# Patient Record
Sex: FEMALE | Race: WHITE | Employment: UNEMPLOYED | URBAN - METROPOLITAN AREA
[De-identification: names, ages, dates, MRNs, and addresses within clinical notes are randomized per-mention and may not be internally consistent; named-entity substitution may affect disease eponyms.]

---

## 2018-03-31 ENCOUNTER — HOSPITAL ENCOUNTER (EMERGENCY)
Age: 38
Discharge: HOME | End: 2018-03-31
Payer: COMMERCIAL

## 2018-03-31 VITALS
BODY MASS INDEX: 32.61 KG/M2 | DIASTOLIC BLOOD PRESSURE: 92 MMHG | HEART RATE: 85 BPM | RESPIRATION RATE: 16 BRPM | WEIGHT: 190 LBS | TEMPERATURE: 98.6 F | OXYGEN SATURATION: 99 % | SYSTOLIC BLOOD PRESSURE: 140 MMHG

## 2018-03-31 DIAGNOSIS — N30.00 ACUTE CYSTITIS WITHOUT HEMATURIA: Primary | ICD-10-CM

## 2018-03-31 LAB
BACTERIA: ABNORMAL /HPF
BILIRUBIN URINE: NEGATIVE
BLOOD, URINE: ABNORMAL
CLARITY: ABNORMAL
COLOR: ABNORMAL
EPITHELIAL CELLS, UA: ABNORMAL /HPF
GLUCOSE URINE: NEGATIVE MG/DL
HCG(URINE) PREGNANCY TEST: NEGATIVE
KETONES, URINE: NEGATIVE MG/DL
LEUKOCYTE ESTERASE, URINE: ABNORMAL
NITRITE, URINE: NEGATIVE
PH UA: 5 (ref 5–9)
PROTEIN UA: NEGATIVE MG/DL
RBC UA: ABNORMAL /HPF (ref 0–2)
SPECIFIC GRAVITY UA: <=1.005 (ref 1–1.03)
UROBILINOGEN, URINE: 0.2 E.U./DL
WBC UA: ABNORMAL /HPF (ref 0–5)

## 2018-03-31 PROCEDURE — 87088 URINE BACTERIA CULTURE: CPT

## 2018-03-31 PROCEDURE — 99212 OFFICE O/P EST SF 10 MIN: CPT

## 2018-03-31 PROCEDURE — 81025 URINE PREGNANCY TEST: CPT

## 2018-03-31 PROCEDURE — 81001 URINALYSIS AUTO W/SCOPE: CPT

## 2018-03-31 RX ORDER — CEPHALEXIN 500 MG/1
500 CAPSULE ORAL 3 TIMES DAILY
Qty: 21 CAPSULE | Refills: 0 | Status: SHIPPED | OUTPATIENT
Start: 2018-03-31 | End: 2018-04-07

## 2018-03-31 RX ORDER — FERROUS SULFATE 325(65) MG
325 TABLET ORAL EVERY OTHER DAY
Status: ON HOLD | COMMUNITY
End: 2019-12-31 | Stop reason: ALTCHOICE

## 2018-03-31 ASSESSMENT — PAIN DESCRIPTION - DESCRIPTORS: DESCRIPTORS: ACHING

## 2018-03-31 ASSESSMENT — PAIN SCALES - GENERAL: PAINLEVEL_OUTOF10: 7

## 2018-03-31 ASSESSMENT — PAIN DESCRIPTION - FREQUENCY: FREQUENCY: CONTINUOUS

## 2018-03-31 ASSESSMENT — PAIN DESCRIPTION - LOCATION: LOCATION: BACK

## 2018-03-31 ASSESSMENT — PAIN DESCRIPTION - PAIN TYPE: TYPE: ACUTE PAIN

## 2018-04-01 LAB — URINE CULTURE, ROUTINE: NORMAL

## 2018-04-23 ENCOUNTER — APPOINTMENT (OUTPATIENT)
Dept: GENERAL RADIOLOGY | Age: 38
End: 2018-04-23
Payer: COMMERCIAL

## 2018-04-23 ENCOUNTER — HOSPITAL ENCOUNTER (EMERGENCY)
Age: 38
Discharge: HOME | End: 2018-04-23
Attending: FAMILY MEDICINE
Payer: COMMERCIAL

## 2018-04-23 VITALS
SYSTOLIC BLOOD PRESSURE: 110 MMHG | BODY MASS INDEX: 34.96 KG/M2 | WEIGHT: 190 LBS | HEIGHT: 62 IN | OXYGEN SATURATION: 98 % | DIASTOLIC BLOOD PRESSURE: 80 MMHG | RESPIRATION RATE: 14 BRPM | HEART RATE: 76 BPM | TEMPERATURE: 98.2 F

## 2018-04-23 DIAGNOSIS — R07.89 ATYPICAL CHEST PAIN: Primary | ICD-10-CM

## 2018-04-23 DIAGNOSIS — R00.2 PALPITATIONS: ICD-10-CM

## 2018-04-23 DIAGNOSIS — R10.13 ABDOMINAL PAIN, EPIGASTRIC: ICD-10-CM

## 2018-04-23 LAB
ANION GAP SERPL CALCULATED.3IONS-SCNC: 11 MMOL/L (ref 7–16)
BASOPHILS ABSOLUTE: 0.05 E9/L (ref 0–0.2)
BASOPHILS RELATIVE PERCENT: 0.7 % (ref 0–2)
BUN BLDV-MCNC: 16 MG/DL (ref 6–20)
CALCIUM SERPL-MCNC: 10.1 MG/DL (ref 8.6–10.2)
CHLORIDE BLD-SCNC: 103 MMOL/L (ref 98–107)
CO2: 26 MMOL/L (ref 22–29)
CREAT SERPL-MCNC: 0.7 MG/DL (ref 0.5–1)
EOSINOPHILS ABSOLUTE: 0.24 E9/L (ref 0.05–0.5)
EOSINOPHILS RELATIVE PERCENT: 3.6 % (ref 0–6)
GFR AFRICAN AMERICAN: >60
GFR NON-AFRICAN AMERICAN: >60 ML/MIN/1.73
GLUCOSE BLD-MCNC: 127 MG/DL (ref 74–109)
HCT VFR BLD CALC: 40.1 % (ref 34–48)
HEMOGLOBIN: 13.6 G/DL (ref 11.5–15.5)
IMMATURE GRANULOCYTES #: 0.03 E9/L
IMMATURE GRANULOCYTES %: 0.4 % (ref 0–5)
LYMPHOCYTES ABSOLUTE: 1.74 E9/L (ref 1.5–4)
LYMPHOCYTES RELATIVE PERCENT: 25.7 % (ref 20–42)
MCH RBC QN AUTO: 30.5 PG (ref 26–35)
MCHC RBC AUTO-ENTMCNC: 33.9 % (ref 32–34.5)
MCV RBC AUTO: 89.9 FL (ref 80–99.9)
MONOCYTES ABSOLUTE: 0.41 E9/L (ref 0.1–0.95)
MONOCYTES RELATIVE PERCENT: 6.1 % (ref 2–12)
NEUTROPHILS ABSOLUTE: 4.29 E9/L (ref 1.8–7.3)
NEUTROPHILS RELATIVE PERCENT: 63.5 % (ref 43–80)
PDW BLD-RTO: 12 FL (ref 11.5–15)
PLATELET # BLD: 321 E9/L (ref 130–450)
PMV BLD AUTO: 8.4 FL (ref 7–12)
POTASSIUM REFLEX MAGNESIUM: 4.6 MMOL/L (ref 3.5–5)
RBC # BLD: 4.46 E12/L (ref 3.5–5.5)
SODIUM BLD-SCNC: 140 MMOL/L (ref 132–146)
TROPONIN: <0.01 NG/ML (ref 0–0.03)
WBC # BLD: 6.8 E9/L (ref 4.5–11.5)

## 2018-04-23 PROCEDURE — 71046 X-RAY EXAM CHEST 2 VIEWS: CPT

## 2018-04-23 PROCEDURE — 6370000000 HC RX 637 (ALT 250 FOR IP): Performed by: FAMILY MEDICINE

## 2018-04-23 PROCEDURE — 84484 ASSAY OF TROPONIN QUANT: CPT

## 2018-04-23 PROCEDURE — 93005 ELECTROCARDIOGRAM TRACING: CPT | Performed by: FAMILY MEDICINE

## 2018-04-23 PROCEDURE — 99285 EMERGENCY DEPT VISIT HI MDM: CPT

## 2018-04-23 PROCEDURE — 85025 COMPLETE CBC W/AUTO DIFF WBC: CPT

## 2018-04-23 PROCEDURE — 80048 BASIC METABOLIC PNL TOTAL CA: CPT

## 2018-04-23 PROCEDURE — 36415 COLL VENOUS BLD VENIPUNCTURE: CPT

## 2018-04-23 RX ORDER — SUCRALFATE 1 G/1
1 TABLET ORAL 4 TIMES DAILY
Qty: 120 TABLET | Refills: 3 | Status: ON HOLD | OUTPATIENT
Start: 2018-04-23 | End: 2019-12-31 | Stop reason: ALTCHOICE

## 2018-04-23 RX ORDER — ASPIRIN 81 MG/1
324 TABLET, CHEWABLE ORAL ONCE
Status: DISCONTINUED | OUTPATIENT
Start: 2018-04-23 | End: 2018-04-23 | Stop reason: HOSPADM

## 2018-04-23 RX ADMIN — Medication 30 ML: at 09:25

## 2018-04-23 ASSESSMENT — ENCOUNTER SYMPTOMS
ORTHOPNEA: 0
VOMITING: 0
ABDOMINAL PAIN: 0
COUGH: 0
BACK PAIN: 0
HEARTBURN: 0
TROUBLE SWALLOWING: 0
SHORTNESS OF BREATH: 0
NAUSEA: 0

## 2018-04-23 ASSESSMENT — PAIN DESCRIPTION - PAIN TYPE
TYPE: ACUTE PAIN
TYPE: ACUTE PAIN

## 2018-04-23 ASSESSMENT — PAIN DESCRIPTION - FREQUENCY: FREQUENCY: INTERMITTENT

## 2018-04-23 ASSESSMENT — PAIN DESCRIPTION - ORIENTATION
ORIENTATION: MID
ORIENTATION: MID

## 2018-04-23 ASSESSMENT — PAIN SCALES - GENERAL
PAINLEVEL_OUTOF10: 3
PAINLEVEL_OUTOF10: 7
PAINLEVEL_OUTOF10: 7

## 2018-04-23 ASSESSMENT — PAIN DESCRIPTION - DESCRIPTORS: DESCRIPTORS: HEAVINESS

## 2018-04-23 ASSESSMENT — PAIN DESCRIPTION - LOCATION: LOCATION: CHEST

## 2018-04-23 ASSESSMENT — HEART SCORE: ECG: 0

## 2018-04-30 LAB
EKG ATRIAL RATE: 77 BPM
EKG P AXIS: 27 DEGREES
EKG P-R INTERVAL: 152 MS
EKG Q-T INTERVAL: 376 MS
EKG QRS DURATION: 72 MS
EKG QTC CALCULATION (BAZETT): 425 MS
EKG R AXIS: -3 DEGREES
EKG T AXIS: 23 DEGREES
EKG VENTRICULAR RATE: 77 BPM

## 2018-10-27 ENCOUNTER — HOSPITAL ENCOUNTER (EMERGENCY)
Age: 38
Discharge: HOME | End: 2018-10-27
Payer: COMMERCIAL

## 2018-10-27 VITALS
RESPIRATION RATE: 20 BRPM | BODY MASS INDEX: 36.58 KG/M2 | HEART RATE: 92 BPM | TEMPERATURE: 98.3 F | SYSTOLIC BLOOD PRESSURE: 138 MMHG | OXYGEN SATURATION: 97 % | DIASTOLIC BLOOD PRESSURE: 98 MMHG | WEIGHT: 200 LBS

## 2018-10-27 DIAGNOSIS — K04.7 DENTAL INFECTION: Primary | ICD-10-CM

## 2018-10-27 PROCEDURE — 99212 OFFICE O/P EST SF 10 MIN: CPT

## 2018-10-27 RX ORDER — AMOXICILLIN AND CLAVULANATE POTASSIUM 875; 125 MG/1; MG/1
1 TABLET, FILM COATED ORAL 2 TIMES DAILY
Qty: 20 TABLET | Refills: 0 | Status: SHIPPED | OUTPATIENT
Start: 2018-10-27 | End: 2018-11-06

## 2018-10-27 ASSESSMENT — PAIN DESCRIPTION - PAIN TYPE: TYPE: ACUTE PAIN

## 2018-10-27 ASSESSMENT — PAIN SCALES - GENERAL: PAINLEVEL_OUTOF10: 7

## 2018-10-27 ASSESSMENT — PAIN DESCRIPTION - LOCATION: LOCATION: TEETH

## 2018-10-27 ASSESSMENT — PAIN DESCRIPTION - ORIENTATION: ORIENTATION: LEFT;UPPER

## 2019-07-08 ENCOUNTER — HOSPITAL ENCOUNTER (EMERGENCY)
Age: 39
Discharge: HOME | End: 2019-07-08
Attending: EMERGENCY MEDICINE
Payer: COMMERCIAL

## 2019-07-08 ENCOUNTER — APPOINTMENT (OUTPATIENT)
Dept: CT IMAGING | Age: 39
End: 2019-07-08
Payer: COMMERCIAL

## 2019-07-08 VITALS
TEMPERATURE: 98.1 F | WEIGHT: 190 LBS | RESPIRATION RATE: 16 BRPM | SYSTOLIC BLOOD PRESSURE: 132 MMHG | BODY MASS INDEX: 32.44 KG/M2 | HEART RATE: 76 BPM | HEIGHT: 64 IN | OXYGEN SATURATION: 99 % | DIASTOLIC BLOOD PRESSURE: 62 MMHG

## 2019-07-08 DIAGNOSIS — R10.11 RIGHT UPPER QUADRANT ABDOMINAL PAIN: Primary | ICD-10-CM

## 2019-07-08 LAB
ALBUMIN SERPL-MCNC: 4.4 G/DL (ref 3.5–5.2)
ALP BLD-CCNC: 87 U/L (ref 35–104)
ALT SERPL-CCNC: 11 U/L (ref 0–32)
ANION GAP SERPL CALCULATED.3IONS-SCNC: 13 MMOL/L (ref 7–16)
AST SERPL-CCNC: 15 U/L (ref 0–31)
BACTERIA: ABNORMAL /HPF
BASOPHILS ABSOLUTE: 0.05 E9/L (ref 0–0.2)
BASOPHILS RELATIVE PERCENT: 0.6 % (ref 0–2)
BILIRUB SERPL-MCNC: <0.2 MG/DL (ref 0–1.2)
BILIRUBIN URINE: NEGATIVE
BLOOD, URINE: NEGATIVE
BUN BLDV-MCNC: 18 MG/DL (ref 6–20)
CALCIUM SERPL-MCNC: 9.9 MG/DL (ref 8.6–10.2)
CHLORIDE BLD-SCNC: 102 MMOL/L (ref 98–107)
CLARITY: CLEAR
CO2: 25 MMOL/L (ref 22–29)
COLOR: YELLOW
CREAT SERPL-MCNC: 0.7 MG/DL (ref 0.5–1)
EOSINOPHILS ABSOLUTE: 0.27 E9/L (ref 0.05–0.5)
EOSINOPHILS RELATIVE PERCENT: 3.3 % (ref 0–6)
EPITHELIAL CELLS, UA: ABNORMAL /HPF
GFR AFRICAN AMERICAN: >60
GFR NON-AFRICAN AMERICAN: >60 ML/MIN/1.73
GLUCOSE BLD-MCNC: 137 MG/DL (ref 74–99)
GLUCOSE URINE: NEGATIVE MG/DL
HCT VFR BLD CALC: 42 % (ref 34–48)
HEMOGLOBIN: 14.2 G/DL (ref 11.5–15.5)
IMMATURE GRANULOCYTES #: 0.04 E9/L
IMMATURE GRANULOCYTES %: 0.5 % (ref 0–5)
KETONES, URINE: NEGATIVE MG/DL
LACTIC ACID: 1.6 MMOL/L (ref 0.5–2.2)
LEUKOCYTE ESTERASE, URINE: ABNORMAL
LIPASE: 22 U/L (ref 13–60)
LYMPHOCYTES ABSOLUTE: 2.11 E9/L (ref 1.5–4)
LYMPHOCYTES RELATIVE PERCENT: 25.5 % (ref 20–42)
MCH RBC QN AUTO: 30.4 PG (ref 26–35)
MCHC RBC AUTO-ENTMCNC: 33.8 % (ref 32–34.5)
MCV RBC AUTO: 89.9 FL (ref 80–99.9)
MONOCYTES ABSOLUTE: 0.65 E9/L (ref 0.1–0.95)
MONOCYTES RELATIVE PERCENT: 7.9 % (ref 2–12)
NEUTROPHILS ABSOLUTE: 5.15 E9/L (ref 1.8–7.3)
NEUTROPHILS RELATIVE PERCENT: 62.2 % (ref 43–80)
NITRITE, URINE: NEGATIVE
PDW BLD-RTO: 11.9 FL (ref 11.5–15)
PH UA: 6 (ref 5–9)
PLATELET # BLD: 308 E9/L (ref 130–450)
PMV BLD AUTO: 8.9 FL (ref 7–12)
POTASSIUM SERPL-SCNC: 4.6 MMOL/L (ref 3.5–5)
PROTEIN UA: NEGATIVE MG/DL
RBC # BLD: 4.67 E12/L (ref 3.5–5.5)
RBC UA: ABNORMAL /HPF (ref 0–2)
SODIUM BLD-SCNC: 140 MMOL/L (ref 132–146)
SPECIFIC GRAVITY UA: 1.01 (ref 1–1.03)
TOTAL PROTEIN: 7.8 G/DL (ref 6.4–8.3)
UROBILINOGEN, URINE: 0.2 E.U./DL
WBC # BLD: 8.3 E9/L (ref 4.5–11.5)
WBC UA: ABNORMAL /HPF (ref 0–5)

## 2019-07-08 PROCEDURE — 85025 COMPLETE CBC W/AUTO DIFF WBC: CPT

## 2019-07-08 PROCEDURE — 74176 CT ABD & PELVIS W/O CONTRAST: CPT

## 2019-07-08 PROCEDURE — 36415 COLL VENOUS BLD VENIPUNCTURE: CPT

## 2019-07-08 PROCEDURE — 83605 ASSAY OF LACTIC ACID: CPT

## 2019-07-08 PROCEDURE — 99284 EMERGENCY DEPT VISIT MOD MDM: CPT

## 2019-07-08 PROCEDURE — 83690 ASSAY OF LIPASE: CPT

## 2019-07-08 PROCEDURE — 81001 URINALYSIS AUTO W/SCOPE: CPT

## 2019-07-08 PROCEDURE — 80053 COMPREHEN METABOLIC PANEL: CPT

## 2019-07-08 ASSESSMENT — PAIN DESCRIPTION - DESCRIPTORS: DESCRIPTORS: SHARP

## 2019-07-08 ASSESSMENT — PAIN SCALES - GENERAL: PAINLEVEL_OUTOF10: 7

## 2019-07-08 ASSESSMENT — PAIN DESCRIPTION - PROGRESSION: CLINICAL_PROGRESSION: NOT CHANGED

## 2019-07-08 ASSESSMENT — PAIN DESCRIPTION - ORIENTATION: ORIENTATION: RIGHT;MID

## 2019-07-08 ASSESSMENT — PAIN DESCRIPTION - ONSET: ONSET: ON-GOING

## 2019-07-08 ASSESSMENT — PAIN DESCRIPTION - FREQUENCY: FREQUENCY: INTERMITTENT

## 2019-07-08 ASSESSMENT — PAIN DESCRIPTION - LOCATION: LOCATION: ABDOMEN

## 2019-07-08 ASSESSMENT — PAIN DESCRIPTION - PAIN TYPE: TYPE: ACUTE PAIN

## 2019-07-08 NOTE — ED PROVIDER NOTES
regarding the diagnosis and prognosis. Questions are answered at this time and they are agreeable with the plan.      --------------------------------- IMPRESSION AND DISPOSITION ---------------------------------    IMPRESSION  1.  Right upper quadrant abdominal pain        DISPOSITION  Disposition: Discharge to home  Patient condition is stable                  Jenelle Pichardo MD  07/08/19 [id number]

## 2019-11-01 ENCOUNTER — TELEPHONE (OUTPATIENT)
Dept: ADMINISTRATIVE | Age: 39
End: 2019-11-01

## 2019-11-02 PROBLEM — Z92.89 HISTORY OF EXERCISE STRESS TEST: Status: ACTIVE | Noted: 2019-11-02

## 2019-11-05 ENCOUNTER — OFFICE VISIT (OUTPATIENT)
Dept: CARDIOLOGY CLINIC | Age: 39
End: 2019-11-05
Payer: COMMERCIAL

## 2019-11-05 VITALS
DIASTOLIC BLOOD PRESSURE: 90 MMHG | HEIGHT: 64 IN | RESPIRATION RATE: 16 BRPM | WEIGHT: 193 LBS | BODY MASS INDEX: 32.95 KG/M2 | HEART RATE: 89 BPM | SYSTOLIC BLOOD PRESSURE: 142 MMHG

## 2019-11-05 DIAGNOSIS — Z92.89 HISTORY OF EXERCISE STRESS TEST: ICD-10-CM

## 2019-11-05 DIAGNOSIS — I63.9 CRYPTOGENIC STROKE (HCC): ICD-10-CM

## 2019-11-05 DIAGNOSIS — R07.2 PRECORDIAL PAIN: ICD-10-CM

## 2019-11-05 PROCEDURE — 99245 OFF/OP CONSLTJ NEW/EST HI 55: CPT | Performed by: INTERNAL MEDICINE

## 2019-11-05 PROCEDURE — 93000 ELECTROCARDIOGRAM COMPLETE: CPT | Performed by: INTERNAL MEDICINE

## 2019-11-05 RX ORDER — MIRABEGRON 50 MG/1
50 TABLET, FILM COATED, EXTENDED RELEASE ORAL DAILY
Refills: 0 | COMMUNITY
Start: 2019-10-22 | End: 2020-01-24 | Stop reason: ALTCHOICE

## 2019-11-07 ENCOUNTER — TELEPHONE (OUTPATIENT)
Dept: CARDIOLOGY CLINIC | Age: 39
End: 2019-11-07

## 2019-11-07 ENCOUNTER — HOSPITAL ENCOUNTER (EMERGENCY)
Age: 39
Discharge: HOME | End: 2019-11-07
Payer: COMMERCIAL

## 2019-11-07 ENCOUNTER — APPOINTMENT (OUTPATIENT)
Dept: GENERAL RADIOLOGY | Age: 39
End: 2019-11-07
Payer: COMMERCIAL

## 2019-11-07 VITALS
HEART RATE: 83 BPM | WEIGHT: 193 LBS | DIASTOLIC BLOOD PRESSURE: 98 MMHG | RESPIRATION RATE: 17 BRPM | OXYGEN SATURATION: 100 % | SYSTOLIC BLOOD PRESSURE: 144 MMHG | TEMPERATURE: 97.7 F | BODY MASS INDEX: 33.13 KG/M2

## 2019-11-07 DIAGNOSIS — R04.2 HEMOPTYSIS: Primary | ICD-10-CM

## 2019-11-07 PROBLEM — I63.9 CRYPTOGENIC STROKE (HCC): Status: ACTIVE | Noted: 2019-11-07

## 2019-11-07 LAB
ALBUMIN SERPL-MCNC: 4.5 G/DL (ref 3.5–5.2)
ALP BLD-CCNC: 82 U/L (ref 35–104)
ALT SERPL-CCNC: 19 U/L (ref 0–32)
ANION GAP SERPL CALCULATED.3IONS-SCNC: 8 MMOL/L (ref 7–16)
APTT: 28.6 SEC (ref 24.5–35.1)
AST SERPL-CCNC: 21 U/L (ref 0–31)
BASOPHILS ABSOLUTE: 0.04 E9/L (ref 0–0.2)
BASOPHILS RELATIVE PERCENT: 0.6 % (ref 0–2)
BILIRUB SERPL-MCNC: 0.3 MG/DL (ref 0–1.2)
BUN BLDV-MCNC: 8 MG/DL (ref 6–20)
CALCIUM SERPL-MCNC: 10 MG/DL (ref 8.6–10.2)
CHLORIDE BLD-SCNC: 103 MMOL/L (ref 98–107)
CO2: 29 MMOL/L (ref 22–29)
CREAT SERPL-MCNC: 0.8 MG/DL (ref 0.5–1)
EOSINOPHILS ABSOLUTE: 0.08 E9/L (ref 0.05–0.5)
EOSINOPHILS RELATIVE PERCENT: 1.2 % (ref 0–6)
GFR AFRICAN AMERICAN: >60
GFR NON-AFRICAN AMERICAN: >60 ML/MIN/1.73
GLUCOSE BLD-MCNC: 99 MG/DL (ref 74–99)
HCT VFR BLD CALC: 42.7 % (ref 34–48)
HEMOGLOBIN: 14 G/DL (ref 11.5–15.5)
IMMATURE GRANULOCYTES #: 0.02 E9/L
IMMATURE GRANULOCYTES %: 0.3 % (ref 0–5)
INR BLD: 1
LYMPHOCYTES ABSOLUTE: 1.47 E9/L (ref 1.5–4)
LYMPHOCYTES RELATIVE PERCENT: 21.9 % (ref 20–42)
MCH RBC QN AUTO: 29.7 PG (ref 26–35)
MCHC RBC AUTO-ENTMCNC: 32.8 % (ref 32–34.5)
MCV RBC AUTO: 90.5 FL (ref 80–99.9)
MONOCYTES ABSOLUTE: 0.43 E9/L (ref 0.1–0.95)
MONOCYTES RELATIVE PERCENT: 6.4 % (ref 2–12)
NEUTROPHILS ABSOLUTE: 4.67 E9/L (ref 1.8–7.3)
NEUTROPHILS RELATIVE PERCENT: 69.6 % (ref 43–80)
PDW BLD-RTO: 12 FL (ref 11.5–15)
PLATELET # BLD: 338 E9/L (ref 130–450)
PMV BLD AUTO: 8.6 FL (ref 7–12)
POTASSIUM SERPL-SCNC: 4.6 MMOL/L (ref 3.5–5)
PROTHROMBIN TIME: 11.2 SEC (ref 9.3–12.4)
RBC # BLD: 4.72 E12/L (ref 3.5–5.5)
SODIUM BLD-SCNC: 140 MMOL/L (ref 132–146)
TOTAL PROTEIN: 7.7 G/DL (ref 6.4–8.3)
WBC # BLD: 6.7 E9/L (ref 4.5–11.5)

## 2019-11-07 PROCEDURE — 85730 THROMBOPLASTIN TIME PARTIAL: CPT

## 2019-11-07 PROCEDURE — 85610 PROTHROMBIN TIME: CPT

## 2019-11-07 PROCEDURE — 36415 COLL VENOUS BLD VENIPUNCTURE: CPT

## 2019-11-07 PROCEDURE — 99283 EMERGENCY DEPT VISIT LOW MDM: CPT

## 2019-11-07 PROCEDURE — 71046 X-RAY EXAM CHEST 2 VIEWS: CPT

## 2019-11-07 PROCEDURE — 85025 COMPLETE CBC W/AUTO DIFF WBC: CPT

## 2019-11-07 PROCEDURE — 80053 COMPREHEN METABOLIC PANEL: CPT

## 2019-11-07 ASSESSMENT — PAIN DESCRIPTION - PAIN TYPE: TYPE: ACUTE PAIN

## 2019-11-07 ASSESSMENT — PAIN DESCRIPTION - LOCATION: LOCATION: THROAT

## 2019-12-31 ENCOUNTER — HOSPITAL ENCOUNTER (OUTPATIENT)
Age: 39
Setting detail: OBSERVATION
Discharge: HOME | End: 2020-01-01
Attending: EMERGENCY MEDICINE | Admitting: INTERNAL MEDICINE
Payer: COMMERCIAL

## 2019-12-31 ENCOUNTER — APPOINTMENT (OUTPATIENT)
Dept: CT IMAGING | Age: 39
End: 2019-12-31
Payer: COMMERCIAL

## 2019-12-31 ENCOUNTER — APPOINTMENT (OUTPATIENT)
Dept: GENERAL RADIOLOGY | Age: 39
End: 2019-12-31
Payer: COMMERCIAL

## 2019-12-31 PROBLEM — R07.9 CHEST PAIN: Status: ACTIVE | Noted: 2019-12-31

## 2019-12-31 LAB
ANION GAP SERPL CALCULATED.3IONS-SCNC: 12 MMOL/L (ref 7–16)
BACTERIA: ABNORMAL /HPF
BASOPHILS ABSOLUTE: 0.04 E9/L (ref 0–0.2)
BASOPHILS RELATIVE PERCENT: 0.6 % (ref 0–2)
BILIRUBIN URINE: NEGATIVE
BLOOD, URINE: ABNORMAL
BUN BLDV-MCNC: 10 MG/DL (ref 6–20)
CALCIUM SERPL-MCNC: 10 MG/DL (ref 8.6–10.2)
CHLORIDE BLD-SCNC: 98 MMOL/L (ref 98–107)
CLARITY: CLEAR
CO2: 28 MMOL/L (ref 22–29)
COLOR: YELLOW
CREAT SERPL-MCNC: 0.6 MG/DL (ref 0.5–1)
D DIMER: 238 NG/ML DDU
EKG ATRIAL RATE: 84 BPM
EKG P AXIS: 34 DEGREES
EKG P-R INTERVAL: 140 MS
EKG Q-T INTERVAL: 356 MS
EKG QRS DURATION: 74 MS
EKG QTC CALCULATION (BAZETT): 420 MS
EKG R AXIS: 27 DEGREES
EKG T AXIS: 17 DEGREES
EKG VENTRICULAR RATE: 84 BPM
EOSINOPHILS ABSOLUTE: 0.17 E9/L (ref 0.05–0.5)
EOSINOPHILS RELATIVE PERCENT: 2.7 % (ref 0–6)
GFR AFRICAN AMERICAN: >60
GFR NON-AFRICAN AMERICAN: >60 ML/MIN/1.73
GLUCOSE BLD-MCNC: 114 MG/DL (ref 74–99)
GLUCOSE URINE: NEGATIVE MG/DL
HCG, URINE, POC: NEGATIVE
HCT VFR BLD CALC: 42.1 % (ref 34–48)
HEMOGLOBIN: 13.5 G/DL (ref 11.5–15.5)
IMMATURE GRANULOCYTES #: 0.01 E9/L
IMMATURE GRANULOCYTES %: 0.2 % (ref 0–5)
KETONES, URINE: NEGATIVE MG/DL
LEUKOCYTE ESTERASE, URINE: ABNORMAL
LYMPHOCYTES ABSOLUTE: 1.67 E9/L (ref 1.5–4)
LYMPHOCYTES RELATIVE PERCENT: 26.4 % (ref 20–42)
Lab: NORMAL
MCH RBC QN AUTO: 29.7 PG (ref 26–35)
MCHC RBC AUTO-ENTMCNC: 32.1 % (ref 32–34.5)
MCV RBC AUTO: 92.5 FL (ref 80–99.9)
MONOCYTES ABSOLUTE: 0.5 E9/L (ref 0.1–0.95)
MONOCYTES RELATIVE PERCENT: 7.9 % (ref 2–12)
NEGATIVE QC PASS/FAIL: NORMAL
NEUTROPHILS ABSOLUTE: 3.94 E9/L (ref 1.8–7.3)
NEUTROPHILS RELATIVE PERCENT: 62.2 % (ref 43–80)
NITRITE, URINE: NEGATIVE
PDW BLD-RTO: 12.1 FL (ref 11.5–15)
PH UA: 6 (ref 5–9)
PLATELET # BLD: 301 E9/L (ref 130–450)
PMV BLD AUTO: 8.5 FL (ref 7–12)
POSITIVE QC PASS/FAIL: NORMAL
POTASSIUM REFLEX MAGNESIUM: 4.4 MMOL/L (ref 3.5–5)
PROTEIN UA: NEGATIVE MG/DL
RBC # BLD: 4.55 E12/L (ref 3.5–5.5)
RBC UA: ABNORMAL /HPF (ref 0–2)
SODIUM BLD-SCNC: 138 MMOL/L (ref 132–146)
SPECIFIC GRAVITY UA: <=1.005 (ref 1–1.03)
TROPONIN: <0.01 NG/ML (ref 0–0.03)
UROBILINOGEN, URINE: 0.2 E.U./DL
WBC # BLD: 6.3 E9/L (ref 4.5–11.5)
WBC UA: ABNORMAL /HPF (ref 0–5)

## 2019-12-31 PROCEDURE — 93005 ELECTROCARDIOGRAM TRACING: CPT | Performed by: PHYSICIAN ASSISTANT

## 2019-12-31 PROCEDURE — G0378 HOSPITAL OBSERVATION PER HR: HCPCS

## 2019-12-31 PROCEDURE — 80048 BASIC METABOLIC PNL TOTAL CA: CPT

## 2019-12-31 PROCEDURE — 84484 ASSAY OF TROPONIN QUANT: CPT

## 2019-12-31 PROCEDURE — 99285 EMERGENCY DEPT VISIT HI MDM: CPT

## 2019-12-31 PROCEDURE — 2580000003 HC RX 258: Performed by: INTERNAL MEDICINE

## 2019-12-31 PROCEDURE — 2580000003 HC RX 258: Performed by: RADIOLOGY

## 2019-12-31 PROCEDURE — 93010 ELECTROCARDIOGRAM REPORT: CPT | Performed by: INTERNAL MEDICINE

## 2019-12-31 PROCEDURE — 71046 X-RAY EXAM CHEST 2 VIEWS: CPT

## 2019-12-31 PROCEDURE — 6360000004 HC RX CONTRAST MEDICATION: Performed by: RADIOLOGY

## 2019-12-31 PROCEDURE — 71275 CT ANGIOGRAPHY CHEST: CPT

## 2019-12-31 PROCEDURE — 85378 FIBRIN DEGRADE SEMIQUANT: CPT

## 2019-12-31 PROCEDURE — 81001 URINALYSIS AUTO W/SCOPE: CPT

## 2019-12-31 PROCEDURE — 6370000000 HC RX 637 (ALT 250 FOR IP): Performed by: INTERNAL MEDICINE

## 2019-12-31 PROCEDURE — 85025 COMPLETE CBC W/AUTO DIFF WBC: CPT

## 2019-12-31 PROCEDURE — 36415 COLL VENOUS BLD VENIPUNCTURE: CPT

## 2019-12-31 PROCEDURE — 99214 OFFICE O/P EST MOD 30 MIN: CPT | Performed by: INTERNAL MEDICINE

## 2019-12-31 RX ORDER — SODIUM CHLORIDE 0.9 % (FLUSH) 0.9 %
10 SYRINGE (ML) INJECTION ONCE
Status: COMPLETED | OUTPATIENT
Start: 2019-12-31 | End: 2019-12-31

## 2019-12-31 RX ORDER — ASPIRIN 81 MG/1
81 TABLET, CHEWABLE ORAL NIGHTLY
Status: DISCONTINUED | OUTPATIENT
Start: 2019-12-31 | End: 2020-01-01 | Stop reason: HOSPADM

## 2019-12-31 RX ORDER — SODIUM CHLORIDE 0.9 % (FLUSH) 0.9 %
10 SYRINGE (ML) INJECTION 2 TIMES DAILY
Status: DISCONTINUED | OUTPATIENT
Start: 2019-12-31 | End: 2020-01-01 | Stop reason: HOSPADM

## 2019-12-31 RX ORDER — ACETAMINOPHEN 325 MG/1
650 TABLET ORAL EVERY 4 HOURS PRN
Status: DISCONTINUED | OUTPATIENT
Start: 2019-12-31 | End: 2020-01-01 | Stop reason: HOSPADM

## 2019-12-31 RX ORDER — ASPIRIN 81 MG/1
324 TABLET, CHEWABLE ORAL ONCE
Status: DISCONTINUED | OUTPATIENT
Start: 2019-12-31 | End: 2020-01-01 | Stop reason: HOSPADM

## 2019-12-31 RX ORDER — ROSUVASTATIN CALCIUM 10 MG/1
10 TABLET, COATED ORAL DAILY
Status: DISCONTINUED | OUTPATIENT
Start: 2019-12-31 | End: 2020-01-01 | Stop reason: HOSPADM

## 2019-12-31 RX ADMIN — SODIUM CHLORIDE, PRESERVATIVE FREE 10 ML: 5 INJECTION INTRAVENOUS at 20:42

## 2019-12-31 RX ADMIN — ASPIRIN 81 MG 81 MG: 81 TABLET ORAL at 20:42

## 2019-12-31 RX ADMIN — Medication 10 ML: at 12:40

## 2019-12-31 RX ADMIN — ROSUVASTATIN CALCIUM 10 MG: 10 TABLET, FILM COATED ORAL at 20:41

## 2019-12-31 RX ADMIN — IOPAMIDOL 75 ML: 755 INJECTION, SOLUTION INTRAVENOUS at 12:40

## 2019-12-31 RX ADMIN — ACETAMINOPHEN 650 MG: 325 TABLET, FILM COATED ORAL at 20:41

## 2019-12-31 ASSESSMENT — PAIN SCALES - GENERAL
PAINLEVEL_OUTOF10: 0
PAINLEVEL_OUTOF10: 8
PAINLEVEL_OUTOF10: 0

## 2019-12-31 NOTE — ED PROVIDER NOTES
Department of Emergency Medicine   ED  Provider Note  Admit Date/RoomTime: 12/31/2019  9:32 AM  ED Room: 0319/0356-C          History of Present Illness:  12/31/19, Time: 10:07 AM  Chief Complaint   Patient presents with    Chest Pain     starting yesterday after walking on the tredmill . states it feels like someone is sitting on her chest                April Emmanuelle Ambriz is a 46 y.o. female presenting to the ED for chest pain, beginning prior to arrival.  The complaint has been intermittent, moderate in severity, and worsened by nothing. Patient reports chest pain that started last night while she was walking the treadmill. Associated with shortness of breath and some diaphoresis. She reports that when she stopped exercising her symptoms resolved. She reports similar episode this morning which is what prompted her visit. She denies fevers or chills. She denies any current chest pain. She denies orthopnea. No leg swelling. No calf pain. she denies any history of DVT or PE. She denies any recent cardiac evaluation or stress testing. Review of Systems:   Pertinent positives and negatives are stated within HPI, all other systems reviewed and are negative.        --------------------------------------------- PAST HISTORY ---------------------------------------------  Past Medical History:  has a past medical history of Diverticular disease, Gastritis, Hyperlipidemia, Hypertension, Unspecified cerebral artery occlusion with cerebral infarction, and Vertigo. Past Surgical History:  has a past surgical history that includes Abdominoplasty (2011); Upper gastrointestinal endoscopy; and Sigmoidoscopy. Social History:  reports that she has never smoked. She has never used smokeless tobacco. She reports that she does not drink alcohol or use drugs. Family History: family history is not on file. . Unless otherwise noted, family history is non contributory    The patients home medications have been 3. 94 1.80 - 7.30 E9/L    Immature Granulocytes # 0.01 E9/L    Lymphocytes Absolute 1.67 1.50 - 4.00 E9/L    Monocytes Absolute 0.50 0.10 - 0.95 E9/L    Eosinophils Absolute 0.17 0.05 - 0.50 E9/L    Basophils Absolute 0.04 0.00 - 0.20 D5/Z   Basic Metabolic Panel w/ Reflex to MG   Result Value Ref Range    Sodium 138 132 - 146 mmol/L    Potassium reflex Magnesium 4.4 3.5 - 5.0 mmol/L    Chloride 98 98 - 107 mmol/L    CO2 28 22 - 29 mmol/L    Anion Gap 12 7 - 16 mmol/L    Glucose 114 (H) 74 - 99 mg/dL    BUN 10 6 - 20 mg/dL    CREATININE 0.6 0.5 - 1.0 mg/dL    GFR Non-African American >60 >=60 mL/min/1.73    GFR African American >60     Calcium 10.0 8.6 - 10.2 mg/dL   Troponin   Result Value Ref Range    Troponin <0.01 0.00 - 0.03 ng/mL   D-Dimer, Quantitative   Result Value Ref Range    D-Dimer, Quant 238 ng/mL DDU   Urinalysis   Result Value Ref Range    Color, UA Yellow Straw/Yellow    Clarity, UA Clear Clear    Glucose, Ur Negative Negative mg/dL    Bilirubin Urine Negative Negative    Ketones, Urine Negative Negative mg/dL    Specific Gravity, UA <=1.005 1.005 - 1.030    Blood, Urine TRACE-INTACT Negative    pH, UA 6.0 5.0 - 9.0    Protein, UA Negative Negative mg/dL    Urobilinogen, Urine 0.2 <2.0 E.U./dL    Nitrite, Urine Negative Negative    Leukocyte Esterase, Urine TRACE (A) Negative   Troponin   Result Value Ref Range    Troponin <0.01 0.00 - 0.03 ng/mL   Microscopic Urinalysis   Result Value Ref Range    WBC, UA 0-1 0 - 5 /HPF    RBC, UA 0-1 0 - 2 /HPF    Bacteria, UA RARE (A) /HPF   Troponin   Result Value Ref Range    Troponin <0.01 0.00 - 0.03 ng/mL   POC Pregnancy Urine Qual   Result Value Ref Range    HCG, Urine, POC Negative Negative    Lot Number [id number]     Positive QC Pass/Fail Pass     Negative QC Pass/Fail Pass    EKG 12 Lead   Result Value Ref Range    Ventricular Rate 84 BPM    Atrial Rate 84 BPM    P-R Interval 140 ms    QRS Duration 74 ms    Q-T Interval 356 ms    QTc Calculation (Bazett) 420 ms    P Axis 34 degrees    R Axis 27 degrees    T Axis 17 degrees   ,       RADIOLOGY:  Interpreted by Radiologist unless otherwise specified  CTA PULMONARY W CONTRAST   Final Result   No acute process and no evidence of pulmonary emboli               XR CHEST STANDARD (2 VW)   Final Result   Normal chest               Stress/Rest NM Myocardial SPECT Exercise or RX    (Results Pending)   Stress/Rest NM Myocardial SPECT Exercise or RX    (Results Pending)         EKG Interpretation  Interpreted by emergency department physician, Dr. Kerri Acuna     Date of EK19  Time: 902    Rhythm: normal sinus   Rate: normal  Axis: normal  Conduction: normal  ST Segments: no acute change  T Waves: no acute change    Clinical Impression: Sinus rhythm, no acute ischemic changes    Comparison to prior EKG: stable as compared to patient's most recent EKG      ------------------------- NURSING NOTES AND VITALS REVIEWED ---------------------------   The nursing notes within the ED encounter and vital signs as below have been reviewed by myself  /80   Pulse 86   Temp 98 °F (36.7 °C) (Oral)   Resp 16   Ht 5' 4\" (1.626 m)   Wt 185 lb (83.9 kg)   LMP 2019   SpO2 98%   BMI 31.76 kg/m²     Oxygen Saturation Interpretation: Normal    The patients available past medical records and past encounters were reviewed.         ------------------------------ ED COURSE/MEDICAL DECISION MAKING----------------------  Medications   aspirin chewable tablet 324 mg (324 mg Oral Not Given 19 1209)   rosuvastatin (CRESTOR) tablet 10 mg (has no administration in time range)   aspirin chewable tablet 81 mg (has no administration in time range)   acetaminophen (TYLENOL) tablet 650 mg (has no administration in time range)   sodium chloride flush 0.9 % injection 10 mL (10 mLs Intravenous Given 19 1240)   iopamidol (ISOVUE-370) 76 % injection 75 mL (75 mLs Intravenous Given 19 1240)              Medical Decision Making:

## 2019-12-31 NOTE — CONSULTS
Dictated  Exertional chest pain   Normal ekg and initial troponin  Agree with observation admission  Sample enzymes - if positive = cath,  If negative = ett-n tmrw

## 2019-12-31 NOTE — DISCHARGE INSTR - COC
Delivery:[id number]}    Wound Care Documentation and Therapy:        Elimination:  Continence:   · Bowel: {YES / QX:87563}  · Bladder: {YES / MS:56556}  Urinary Catheter: {Urinary Catheter:926810839}   Colostomy/Ileostomy/Ileal Conduit: {YES / OX:29299}       Date of Last BM: ***  No intake or output data in the 24 hours ending 19 1008  No intake/output data recorded.     Safety Concerns:     [address] [facility] Safety Concerns:757707087}    Impairments/Disabilities:      [address] [facility] Impairments/Disabilities:022457484}    Nutrition Therapy:  Current Nutrition Therapy:   [address] [facility] Diet List:844794620}    Routes of Feeding: {CHP DME Other Feedings:670503328}  Liquids: {Slp liquid thickness:95504}  Daily Fluid Restriction: {CHP DME Yes amt example:161632648}  Last Modified Barium Swallow with Video (Video Swallowing Test): {Done Not Done IVFZ:986726222}    Treatments at the Time of Hospital Discharge:   Respiratory Treatments: ***  Oxygen Therapy:  {Therapy; copd oxygen:63430}  Ventilator:    {[facility] Vent WRG}    Rehab Therapies: {THERAPEUTIC INTERVENTION:4651087453}  Weight Bearing Status/Restrictions: [address] [facility] Weight Bearin}  Other Medical Equipment (for information only, NOT a DME order):  {EQUIPMENT:855886193}  Other Treatments: ***    Patient's personal belongings (please select all that are sent with patient):  {[facility] DME Belongings:896220194}    RN SIGNATURE:  {Esignature:692641380}    CASE MANAGEMENT/SOCIAL WORK SECTION    Inpatient Status Date: ***    Readmission Risk Assessment Score:  Readmission Risk              Risk of Unplanned Readmission:        0           Discharging to Facility/ Agency   · Name:   · Address:  · Phone:  · Fax:    Dialysis Facility (if applicable)   · Name:  · Address:  · Dialysis Schedule:  · Phone:  · Fax:    / signature: {Esignature:705924122}    PHYSICIAN SECTION    Prognosis: {Prognosis:7245093250}    Condition at Discharge: [address] Patient Condition:640497567}    Rehab Potential (if transferring to Rehab): {Prognosis:6345233610}    Recommended Labs or Other Treatments After Discharge: ***    Physician Certification: I certify the above information and transfer of Iris Meade  is necessary for the continuing treatment of the diagnosis listed and that she requires {Admit to Appropriate Level of Care:75101} for {GREATER/LESS:259155388} 30 days.      Update Admission H&P: {CHP DME Changes in QMGAN:525557788}    PHYSICIAN SIGNATURE:  {Esignature:089937270}

## 2019-12-31 NOTE — H&P
History & Physical        Reason for admission:    History Obtained From:  patient    HISTORY OF PRESENT ILLNESS:    The patient is a 46 y.o. female who presents with chest pain with walking on the treadmill twice  She has a history of cryptogenic stroke documented with MRI,she had a normal stress test in 2014. Nevin Doshi Past Medical History:        Diagnosis Date    Diverticular disease     Gastritis     Hyperlipidemia     Hypertension     Unspecified cerebral artery occlusion with cerebral infarction     Vertigo        Past Surgical History:        Procedure Laterality Date    ABDOMINOPLASTY  2011    SIGMOIDOSCOPY      UPPER GASTROINTESTINAL ENDOSCOPY         Medications Prior to Admission:    Medications Prior to Admission: MYRBETRIQ 50 MG TB24, 50 mg daily   sucralfate (CARAFATE) 1 GM tablet, Take 1 tablet by mouth 4 times daily  ferrous sulfate 325 (65 Fe) MG tablet, Take 325 mg by mouth every other day   omeprazole (PRILOSEC) 40 MG delayed release capsule, Take 40 mg by mouth daily  rosuvastatin (CRESTOR) 5 MG tablet, Take 10 mg by mouth daily   Cholecalciferol (VITAMIN D3) 5000 UNITS TABS, Take 1 tablet by mouth daily  aspirin 81 MG tablet, Take 81 mg by mouth nightly     Allergies:  Fish-derived products and Peanut-containing drug products    Social History:   TOBACCO:   reports that she has never smoked. She has never used smokeless tobacco.  ETOH:   reports no history of alcohol use. Family History:   No family history on file. REVIEW OF SYSTEMS:  CONSTITUTIONAL:  Neg   Recent weight changes,fatigue,fever,chills or night sweats  EYES:  Neg  blurriness,tearing,itching or acute change in vision  NOSE:  Neg  rhinorrhea,sneezing,itching,allergy or epistaxis  MOUTH/THROAT:  Neg  bleeding gums,hoarseness or sore throat.   RESPIRATORY:   Neg SOB,wheeze,cough,sputum,hemoptysis or bronochitis  CARDIOVASCULAR   Intermittent palpitations+ see HPI  GASTROINTESTINAL:  Neg   Appetite changes,nausea,vomiting,or COLORU Yellow 12/31/2019    PHUR 6.0 12/31/2019    WBCUA 0-1 12/31/2019    RBCUA 0-1 12/31/2019    RBCUA NONE 03/04/2013    BACTERIA RARE 12/31/2019    CLARITYU Clear 12/31/2019    SPECGRAV <=1.005 12/31/2019    LEUKOCYTESUR TRACE 12/31/2019    UROBILINOGEN 0.2 12/31/2019    BILIRUBINUR Negative 12/31/2019    BLOODU TRACE-INTACT 12/31/2019    GLUCOSEU Negative 12/31/2019          RADIOLOGY:   CTA PULMONARY W CONTRAST   Final Result   No acute process and no evidence of pulmonary emboli               XR CHEST STANDARD (2 VW)   Final Result   Normal chest               Stress/Rest NM Myocardial SPECT Exercise or RX    (Results Pending)   Stress/Rest NM Myocardial SPECT Exercise or RX    (Results Pending)       ASSESSMENT:    Patient Active Problem List   Diagnosis Code    Hypercholesteremia E78.00    Migraine headache G43.909    History of exercise stress test Z92.89    Cryptogenic stroke ([facility] [address] I63.9    Chest pain R07.9   HTN  Anxiety    PLAN:  Neg troponin x2 so far  For stress test tomorrow  See orders  Disposition:      Electronically signed by Kell Dao MD on 12/31/2019 at 2:59 PM

## 2019-12-31 NOTE — ED NOTES
Nurse called to ask when pt last ate and last drank anything including caffeine to prep her for a nuclear stress test today by Dr. Miranda Javed RN  12/31/19 [id number]

## 2020-01-01 ENCOUNTER — APPOINTMENT (OUTPATIENT)
Dept: NUCLEAR MEDICINE | Age: 40
End: 2020-01-01
Payer: COMMERCIAL

## 2020-01-01 VITALS
RESPIRATION RATE: 16 BRPM | HEIGHT: 64 IN | TEMPERATURE: 97 F | HEART RATE: 98 BPM | DIASTOLIC BLOOD PRESSURE: 76 MMHG | SYSTOLIC BLOOD PRESSURE: 133 MMHG | WEIGHT: 185 LBS | BODY MASS INDEX: 31.58 KG/M2 | OXYGEN SATURATION: 94 %

## 2020-01-01 LAB
LV EF: 70 %
LVEF MODALITY: NORMAL

## 2020-01-01 PROCEDURE — 93018 CV STRESS TEST I&R ONLY: CPT | Performed by: INTERNAL MEDICINE

## 2020-01-01 PROCEDURE — G0378 HOSPITAL OBSERVATION PER HR: HCPCS

## 2020-01-01 PROCEDURE — A9500 TC99M SESTAMIBI: HCPCS | Performed by: RADIOLOGY

## 2020-01-01 PROCEDURE — 78452 HT MUSCLE IMAGE SPECT MULT: CPT

## 2020-01-01 PROCEDURE — 93016 CV STRESS TEST SUPVJ ONLY: CPT | Performed by: INTERNAL MEDICINE

## 2020-01-01 PROCEDURE — 3430000000 HC RX DIAGNOSTIC RADIOPHARMACEUTICAL: Performed by: RADIOLOGY

## 2020-01-01 PROCEDURE — 93017 CV STRESS TEST TRACING ONLY: CPT

## 2020-01-01 RX ADMIN — Medication 10 MILLICURIE: at 07:52

## 2020-01-01 RX ADMIN — Medication 35 MILLICURIE: at 09:35

## 2020-01-01 ASSESSMENT — PAIN SCALES - GENERAL: PAINLEVEL_OUTOF10: 0

## 2020-01-01 NOTE — PROCEDURES
Following placement of an intravenous catheter 10 millicuries of technetium 99m sestamibi was administered with resting SPECT images obtained approximately 45 minutes post injection. After completion of resting images, the patient exercised for 6:00 minutes on an accelerated Jefe protocol treadmill achieving 10.1 METS of activity and 92% MPHR. No anginal chest pain or cardiac arrhythmias were noted. A normal blood pressure response to exercise was recorded. No electrocardiographic changes were noted. One mintue prior to the completion of exercise 30 millicuries of technetium 99m sestamibi was injected with post stress SPECT images obtained approximately 30 minutes post stress. Perfusion images pending.

## 2020-01-01 NOTE — PROGRESS NOTES
Perfusion images reviewed with no evidence of perfusion abnormalities and normal left ventricular systolic function in combination with a normal exercise stress test suggesting a low risk of acute ischemic events. Continued appropriate risk factor modification appears advisable from a cardiovascular standpoint with no additional assessment presently indicated. We will further evaluate her during hospitalization should additional cardiovascular difficulties or concerns arise with further evaluation by her primary cardiologist, Siobhan Cárdenas as appropriate.

## 2020-01-10 ENCOUNTER — TELEPHONE (OUTPATIENT)
Dept: CARDIOLOGY CLINIC | Age: 40
End: 2020-01-10

## 2020-01-13 ENCOUNTER — TELEPHONE (OUTPATIENT)
Dept: CARDIOLOGY CLINIC | Age: 40
End: 2020-01-13

## 2020-01-24 ENCOUNTER — HOSPITAL ENCOUNTER (EMERGENCY)
Age: 40
Discharge: HOME | End: 2020-01-24
Payer: COMMERCIAL

## 2020-01-24 ENCOUNTER — APPOINTMENT (OUTPATIENT)
Dept: GENERAL RADIOLOGY | Age: 40
End: 2020-01-24
Payer: COMMERCIAL

## 2020-01-24 VITALS
RESPIRATION RATE: 16 BRPM | BODY MASS INDEX: 30.73 KG/M2 | WEIGHT: 180 LBS | DIASTOLIC BLOOD PRESSURE: 77 MMHG | TEMPERATURE: 98 F | HEIGHT: 64 IN | SYSTOLIC BLOOD PRESSURE: 132 MMHG | OXYGEN SATURATION: 99 % | HEART RATE: 80 BPM

## 2020-01-24 LAB
ANION GAP SERPL CALCULATED.3IONS-SCNC: 13 MMOL/L (ref 7–16)
BASOPHILS ABSOLUTE: 0.02 E9/L (ref 0–0.2)
BASOPHILS RELATIVE PERCENT: 0.2 % (ref 0–2)
BUN BLDV-MCNC: 9 MG/DL (ref 6–20)
CALCIUM SERPL-MCNC: 10.3 MG/DL (ref 8.6–10.2)
CHLORIDE BLD-SCNC: 102 MMOL/L (ref 98–107)
CO2: 26 MMOL/L (ref 22–29)
CREAT SERPL-MCNC: 0.6 MG/DL (ref 0.5–1)
EKG ATRIAL RATE: 74 BPM
EKG P AXIS: 22 DEGREES
EKG P-R INTERVAL: 146 MS
EKG Q-T INTERVAL: 364 MS
EKG QRS DURATION: 78 MS
EKG QTC CALCULATION (BAZETT): 404 MS
EKG T AXIS: 24 DEGREES
EKG VENTRICULAR RATE: 74 BPM
EOSINOPHILS ABSOLUTE: 0.01 E9/L (ref 0.05–0.5)
EOSINOPHILS RELATIVE PERCENT: 0.1 % (ref 0–6)
GFR AFRICAN AMERICAN: >60
GFR NON-AFRICAN AMERICAN: >60 ML/MIN/1.73
GLUCOSE BLD-MCNC: 113 MG/DL (ref 74–99)
HCG QUALITATIVE: NEGATIVE
HCT VFR BLD CALC: 40.9 % (ref 34–48)
HEMOGLOBIN: 13.6 G/DL (ref 11.5–15.5)
IMMATURE GRANULOCYTES #: 0.05 E9/L
IMMATURE GRANULOCYTES %: 0.4 % (ref 0–5)
LYMPHOCYTES ABSOLUTE: 1.71 E9/L (ref 1.5–4)
LYMPHOCYTES RELATIVE PERCENT: 14.7 % (ref 20–42)
MCH RBC QN AUTO: 30.7 PG (ref 26–35)
MCHC RBC AUTO-ENTMCNC: 33.3 % (ref 32–34.5)
MCV RBC AUTO: 92.3 FL (ref 80–99.9)
MONOCYTES ABSOLUTE: 0.74 E9/L (ref 0.1–0.95)
MONOCYTES RELATIVE PERCENT: 6.3 % (ref 2–12)
NEUTROPHILS ABSOLUTE: 9.14 E9/L (ref 1.8–7.3)
NEUTROPHILS RELATIVE PERCENT: 78.3 % (ref 43–80)
PDW BLD-RTO: 12.4 FL (ref 11.5–15)
PLATELET # BLD: 374 E9/L (ref 130–450)
PMV BLD AUTO: 8.8 FL (ref 7–12)
POTASSIUM REFLEX MAGNESIUM: 4.2 MMOL/L (ref 3.5–5)
RBC # BLD: 4.43 E12/L (ref 3.5–5.5)
SODIUM BLD-SCNC: 141 MMOL/L (ref 132–146)
TROPONIN: <0.01 NG/ML (ref 0–0.03)
TSH SERPL DL<=0.05 MIU/L-ACNC: 0.4 UIU/ML (ref 0.27–4.2)
WBC # BLD: 11.7 E9/L (ref 4.5–11.5)

## 2020-01-24 PROCEDURE — 85025 COMPLETE CBC W/AUTO DIFF WBC: CPT

## 2020-01-24 PROCEDURE — 71046 X-RAY EXAM CHEST 2 VIEWS: CPT

## 2020-01-24 PROCEDURE — 93005 ELECTROCARDIOGRAM TRACING: CPT | Performed by: EMERGENCY MEDICINE

## 2020-01-24 PROCEDURE — 84703 CHORIONIC GONADOTROPIN ASSAY: CPT

## 2020-01-24 PROCEDURE — 84443 ASSAY THYROID STIM HORMONE: CPT

## 2020-01-24 PROCEDURE — 80048 BASIC METABOLIC PNL TOTAL CA: CPT

## 2020-01-24 PROCEDURE — 36415 COLL VENOUS BLD VENIPUNCTURE: CPT

## 2020-01-24 PROCEDURE — 84484 ASSAY OF TROPONIN QUANT: CPT

## 2020-01-24 PROCEDURE — 99285 EMERGENCY DEPT VISIT HI MDM: CPT

## 2020-01-24 PROCEDURE — 93010 ELECTROCARDIOGRAM REPORT: CPT | Performed by: INTERNAL MEDICINE

## 2020-01-24 RX ORDER — METHYLPREDNISOLONE 4 MG/1
4 TABLET ORAL SEE ADMIN INSTRUCTIONS
COMMUNITY
End: 2021-01-28

## 2020-01-24 RX ORDER — KETOROLAC TROMETHAMINE 30 MG/ML
15 INJECTION, SOLUTION INTRAMUSCULAR; INTRAVENOUS ONCE
Status: DISCONTINUED | OUTPATIENT
Start: 2020-01-24 | End: 2020-01-24 | Stop reason: HOSPADM

## 2020-01-24 ASSESSMENT — ENCOUNTER SYMPTOMS
CHEST TIGHTNESS: 0
SHORTNESS OF BREATH: 0
ABDOMINAL PAIN: 0
SINUS PAIN: 0
VOMITING: 0
COUGH: 0
SORE THROAT: 0
BACK PAIN: 0
NAUSEA: 0
DIARRHEA: 0

## 2020-01-24 ASSESSMENT — PAIN DESCRIPTION - DESCRIPTORS: DESCRIPTORS: ACHING

## 2020-01-24 ASSESSMENT — PAIN DESCRIPTION - LOCATION: LOCATION: CHEST

## 2020-01-24 ASSESSMENT — PAIN DESCRIPTION - PAIN TYPE: TYPE: ACUTE PAIN

## 2020-01-24 ASSESSMENT — PAIN DESCRIPTION - ORIENTATION: ORIENTATION: MID

## 2020-01-24 NOTE — ED PROVIDER NOTES
This is a 29-year-old female with a past medical history of cryptogenic stroke and hypercholesterolemia presents with her  over concerns of chest pain that started last night. She states it feels like a stabbing sensation in the middle of her chest that is worse with position. She notices it whenever she moves her arms or pushes on the middle of her chest.  She denies any particular injuries. She does have a history of recurrent chest pain and palpitations and was actually recently discharged from the hospital after a normal stress test and echo. She follows up with Dr. Eunice Noyola. She has a chest CT pending for next week and then will follow-up with cardiology at that point. On the previous recent visit, patient had a slightly elevated d-dimer and a normal CTA. She has no history of blood clots. She has no unilateral leg swelling or pain. She is not on any birth control. No recent travel or surgery. No family history of clotting disease. The history is provided by the patient and the spouse. No  was used. Review of Systems   Constitutional: Negative for activity change, chills, fatigue and fever. HENT: Negative for congestion, sinus pain and sore throat. Eyes: Negative for visual disturbance. Respiratory: Negative for cough, chest tightness and shortness of breath. Cardiovascular: Positive for chest pain and palpitations. Negative for leg swelling. Gastrointestinal: Negative for abdominal pain, diarrhea, nausea and vomiting. Genitourinary: Negative for dysuria and urgency. Musculoskeletal: Negative for back pain, neck pain and neck stiffness. Skin: Negative for rash. Neurological: Negative for dizziness, syncope and headaches. Psychiatric/Behavioral: Negative for confusion. Physical Exam  Vitals signs and nursing note reviewed. Constitutional:       General: She is not in acute distress. Appearance: Normal appearance.  She is well-developed and normal weight. She is not diaphoretic. HENT:      Head: Normocephalic and atraumatic. Right Ear: External ear normal.      Left Ear: External ear normal.      Nose: Nose normal.      Mouth/Throat:      Mouth: Mucous membranes are moist.      Pharynx: No oropharyngeal exudate. Eyes:      Conjunctiva/sclera: Conjunctivae normal.      Pupils: Pupils are equal, round, and reactive to light. Neck:      Musculoskeletal: Normal range of motion and neck supple. Cardiovascular:      Rate and Rhythm: Normal rate and regular rhythm. Pulses: Normal pulses. Heart sounds: Normal heart sounds. Pulmonary:      Effort: Pulmonary effort is normal. No respiratory distress. Breath sounds: Normal breath sounds. Chest:      Chest wall: No tenderness. Abdominal:      General: Abdomen is flat. Bowel sounds are normal. There is no distension. Palpations: Abdomen is soft. Tenderness: There is tenderness. Comments: midsternal tenderness to palpation   Musculoskeletal: Normal range of motion. Skin:     General: Skin is warm and dry. Capillary Refill: Capillary refill takes less than 2 seconds. Neurological:      General: No focal deficit present. Mental Status: She is alert and oriented to person, place, and time. Mental status is at baseline. Cranial Nerves: No cranial nerve deficit. Sensory: No sensory deficit. Motor: No abnormal muscle tone. Psychiatric:         Mood and Affect: Mood normal.         Behavior: Behavior normal.         Thought Content: Thought content normal.         Judgment: Judgment normal.          Procedures     MDM  Number of Diagnoses or Management Options  Costochondritis:   Diagnosis management comments: This is a pleasant 52-year-old female that presents with concerns over chest pain and palpitations since last night. She has reproducible midsternal tenderness to palpation.   She is overall very well-appearing, vital risk (3.4%)  2-6 points= moderate risk (27.8%)  >6 points= high risk (78.4%)    PERC Rule for PE:      Age ? 50 negative     HR ? 100 negative     O2 Sat on Room Air < 95% negative     Prior History of DVT/PE negative     Recent Trauma or Surgery negative     Hemoptysis negative     Exogenous Estrogen/Hormone Use negative     Unilateral Extgremity Swelling negative     * If ANY Criteria are positive, the PERC rule is not satisfied and cannot be used to rule out PE in this patient.    --------------------------------------------- PAST HISTORY ---------------------------------------------  Past Medical History:  has a past medical history of Diverticular disease, Gastritis, Hyperlipidemia, Hypertension, Unspecified cerebral artery occlusion with cerebral infarction, and Vertigo. Past Surgical History:  has a past surgical history that includes Abdominoplasty (2011); Upper gastrointestinal endoscopy; and Sigmoidoscopy. Social History:  reports that she has never smoked. She has never used smokeless tobacco. She reports that she does not drink alcohol or use drugs. Family History: family history is not on file. The patients home medications have been reviewed. Allergies: Bactrim [sulfamethoxazole-trimethoprim];  Fish-derived products; and Peanut-containing drug products    -------------------------------------------------- RESULTS -------------------------------------------------  Labs:  Results for orders placed or performed during the hospital encounter of 01/24/20   CBC Auto Differential   Result Value Ref Range    WBC 11.7 (H) 4.5 - 11.5 E9/L    RBC 4.43 3.50 - 5.50 E12/L    Hemoglobin 13.6 11.5 - 15.5 g/dL    Hematocrit 40.9 34.0 - 48.0 %    MCV 92.3 80.0 - 99.9 fL    MCH 30.7 26.0 - 35.0 pg    MCHC 33.3 32.0 - 34.5 %    RDW 12.4 11.5 - 15.0 fL    Platelets 195 578 - 857 E9/L    MPV 8.8 7.0 - 12.0 fL    Neutrophils % 78.3 43.0 - 80.0 %    Immature Granulocytes % 0.4 0.0 - 5.0 %    Lymphocytes % 14.7 (L) 20.0 - 42.0 %    Monocytes % 6.3 2.0 - 12.0 %    Eosinophils % 0.1 0.0 - 6.0 %    Basophils % 0.2 0.0 - 2.0 %    Neutrophils Absolute 9.14 (H) 1.80 - 7.30 E9/L    Immature Granulocytes # 0.05 E9/L    Lymphocytes Absolute 1.71 1.50 - 4.00 E9/L    Monocytes Absolute 0.74 0.10 - 0.95 E9/L    Eosinophils Absolute 0.01 (L) 0.05 - 0.50 E9/L    Basophils Absolute 0.02 0.00 - 0.20 D8/Q   Basic Metabolic Panel w/ Reflex to MG   Result Value Ref Range    Sodium 141 132 - 146 mmol/L    Potassium reflex Magnesium 4.2 3.5 - 5.0 mmol/L    Chloride 102 98 - 107 mmol/L    CO2 26 22 - 29 mmol/L    Anion Gap 13 7 - 16 mmol/L    Glucose 113 (H) 74 - 99 mg/dL    BUN 9 6 - 20 mg/dL    CREATININE 0.6 0.5 - 1.0 mg/dL    GFR Non-African American >60 >=60 mL/min/1.73    GFR African American >60     Calcium 10.3 (H) 8.6 - 10.2 mg/dL   Troponin   Result Value Ref Range    Troponin <0.01 0.00 - 0.03 ng/mL   HCG Qualitative, Serum   Result Value Ref Range    hCG Qual NEGATIVE NEGATIVE   EKG 12 Lead   Result Value Ref Range    Ventricular Rate 74 BPM    Atrial Rate 74 BPM    P-R Interval 146 ms    QRS Duration 78 ms    Q-T Interval 364 ms    QTc Calculation (Bazett) 404 ms    P Axis 22 degrees    T Axis 24 degrees       Radiology:  XR CHEST STANDARD (2 VW)   Final Result   Normal chest                     ------------------------- NURSING NOTES AND VITALS REVIEWED ---------------------------  Date / Time Roomed:  1/24/2020  9:26 AM  ED Bed Assignment:  05/05    The nursing notes within the ED encounter and vital signs as below have been reviewed.    BP (!) 155/85   Pulse 82   Temp 98 °F (36.7 °C)   Resp 16   Ht 5' 4\" (1.626 m)   Wt 180 lb (81.6 kg)   LMP 12/18/2019   SpO2 98%   BMI 30.90 kg/m²   Oxygen Saturation Interpretation: Normal      ------------------------------------------ PROGRESS NOTES ------------------------------------------  ED COURSE MEDICATIONS:                Medications   ketorolac (TORADOL) injection 15 mg (15 mg

## 2020-02-10 ENCOUNTER — OFFICE VISIT (OUTPATIENT)
Dept: SURGERY | Age: 40
End: 2020-02-10
Payer: COMMERCIAL

## 2020-02-10 ENCOUNTER — PREP FOR PROCEDURE (OUTPATIENT)
Dept: SURGERY | Age: 40
End: 2020-02-10

## 2020-02-10 VITALS
BODY MASS INDEX: 31.07 KG/M2 | DIASTOLIC BLOOD PRESSURE: 74 MMHG | SYSTOLIC BLOOD PRESSURE: 112 MMHG | RESPIRATION RATE: 16 BRPM | HEIGHT: 64 IN | WEIGHT: 182 LBS

## 2020-02-10 PROCEDURE — 99243 OFF/OP CNSLTJ NEW/EST LOW 30: CPT | Performed by: SURGERY

## 2020-02-10 RX ORDER — ACETAMINOPHEN 500 MG
500 TABLET ORAL EVERY 6 HOURS PRN
COMMUNITY

## 2020-02-10 RX ORDER — SODIUM CHLORIDE 9 MG/ML
INJECTION, SOLUTION INTRAVENOUS CONTINUOUS
Status: CANCELLED | OUTPATIENT
Start: 2020-02-10

## 2020-02-10 NOTE — PATIENT INSTRUCTIONS
doctor be able to see inside your colon during the test.  Follow-up care is a key part of your treatment and safety. Be sure to make and go to all appointments, and call your doctor if you are having problems. It's also a good idea to know your test results and keep a list of the medicines you take. What happens before the procedure? Procedures can be stressful. This information will help you understand what you can expect. And it will help you safely prepare for your procedure. Preparing for the procedure  · Understand exactly what procedure is planned, along with the risks, benefits, and other options. · Tell your doctors ALL the medicines, vitamins, supplements, and herbal remedies you take. Some of these can increase the risk of bleeding or interact with anesthesia. · If you take blood thinners, such as warfarin (Coumadin), clopidogrel (Plavix), or aspirin, be sure to talk to your doctor. He or she will tell you if you should stop taking these medicines before your procedure. Make sure that you understand exactly what your doctor wants you to do. · Your doctor will tell you which medicines to take or stop before your procedure. You may need to stop taking certain medicines a week or more before the procedure. So talk to your doctor as soon as you can. · If you have an advance directive, let your doctor know. It may include a living will and a durable power of  for health care. Bring a copy to the hospital. If you don't have one, you may want to prepare one. It lets your doctor and loved ones know your health care wishes. Doctors advise that everyone prepare these papers before any type of surgery or procedure. Before the procedure  · Follow your doctor's directions about when to stop eating solid foods and drink only clear liquids. You can drink water, clear juices, clear broths, flavored ice pops, and gelatin (such as Jell-O). Do not eat or drink anything red or purple.  This includes grape juice and grape-flavored ice pops. It also includes fruit punch and cherry gelatin. · Drink the \"colon prep\" liquid as your doctor tells you. You will want to stay home, because the liquid will make you go to the bathroom a lot. Your stools will be loose and watery. It is very important to drink all of the liquid. If you have problems drinking it, call your doctor. Some doctors may have you take a tablet rather than drink a liquid. · Do not eat any solid foods after you drink the colon prep. · Stop drinking clear liquids 6 to 8 hours before the test.  What happens on the day of the procedure? · Follow the instructions exactly about when to stop eating and drinking. If you don't, your procedure may be canceled. If your doctor told you to take your medicines on the day of the procedure, take them with only a sip of water. · Take a bath or shower before you come in for your procedure. Do not apply lotions, perfumes, deodorants, or nail polish. · Take off all jewelry and piercings. And take out contact lenses, if you wear them. At the [address] [place] [address] or hospital  · Bring a picture ID. · You will be kept comfortable and safe by your anesthesia provider. The anesthesia may make you sleep. · You will lie on your back or your side with your knees drawn up toward your belly. The doctor will gently put a gloved finger into your anus. Then the doctor puts the scope in and moves it into your colon. The scope goes in easily because it is lubricated. · The doctor may also use small tools to take tissue samples for a biopsy or to remove polyps. This does not hurt. · The test usually takes 30 to 45 minutes. But it may take longer. It depends on what is found and what is done. Going home  · Be sure you have someone to drive you home. Anesthesia and pain medicine make it unsafe for you to drive. · You will be given more specific instructions about recovering from your procedure. When should you call your doctor?   · You have questions or concerns. · You don't understand how to prepare for your procedure. · You are having trouble with the bowel prep. · You become ill before the procedure (such as fever, flu, or a cold). · You need to reschedule or have changed your mind about having the procedure. Where can you learn more? Go to [email or web address] and sign in to your [employer] account. Enter C315 in the Teach 'n Go box to learn more about Colonoscopy: Before Your Procedure.     If you do not have an account, please click on the Sign Up Now link. © 5491-8870 Healthwise, Incorporated. Care instructions adapted under license by [facility] This care instruction is for use with your licensed healthcare professional. If you have questions about a medical condition or this instruction, always ask your healthcare professional. [address] any warranty or liability for your use of this information.   Content Version: 99.4.442203; Current as of: November 20, 2015

## 2020-08-20 LAB
ALBUMIN SERPL-MCNC: NORMAL G/DL
ALP BLD-CCNC: NORMAL U/L
ALT SERPL-CCNC: NORMAL U/L
ANION GAP SERPL CALCULATED.3IONS-SCNC: NORMAL MMOL/L
AST SERPL-CCNC: NORMAL U/L
AVERAGE GLUCOSE: NORMAL
BILIRUB SERPL-MCNC: NORMAL MG/DL
BUN BLDV-MCNC: NORMAL MG/DL
CALCIUM SERPL-MCNC: NORMAL MG/DL
CHLORIDE BLD-SCNC: NORMAL MMOL/L
CO2: NORMAL
CREAT SERPL-MCNC: NORMAL MG/DL
GFR CALCULATED: NORMAL
GLUCOSE BLD-MCNC: NORMAL MG/DL
HBA1C MFR BLD: 5.7 %
POTASSIUM SERPL-SCNC: NORMAL MMOL/L
SODIUM BLD-SCNC: NORMAL MMOL/L
TOTAL PROTEIN: NORMAL
VITAMIN B-12: NORMAL
VITAMIN D 25-HYDROXY: NORMAL
VITAMIN D2, 25 HYDROXY: NORMAL
VITAMIN D3,25 HYDROXY: NORMAL

## 2020-09-10 LAB
BILIRUBIN, URINE: NORMAL
BLOOD, URINE: NORMAL
CLARITY: NORMAL
COLOR: NORMAL
GLUCOSE URINE: NORMAL
KETONES, URINE: NORMAL
LEUKOCYTE ESTERASE, URINE: NORMAL
NITRITE, URINE: NORMAL
PH UA: NORMAL
PROTEIN UA: NORMAL
SPECIFIC GRAVITY, URINE: NORMAL
UROBILINOGEN, URINE: NORMAL

## 2020-09-30 LAB
BASOPHILS ABSOLUTE: NORMAL
BASOPHILS RELATIVE PERCENT: NORMAL
EOSINOPHILS ABSOLUTE: NORMAL
EOSINOPHILS RELATIVE PERCENT: NORMAL
FERRITIN: 96 NG/ML (ref 9–150)
HCT VFR BLD CALC: NORMAL %
HEMOGLOBIN: NORMAL
LYMPHOCYTES ABSOLUTE: NORMAL
LYMPHOCYTES RELATIVE PERCENT: NORMAL
MCH RBC QN AUTO: NORMAL PG
MCHC RBC AUTO-ENTMCNC: NORMAL G/DL
MCV RBC AUTO: NORMAL FL
MONOCYTES ABSOLUTE: NORMAL
MONOCYTES RELATIVE PERCENT: NORMAL
NEUTROPHILS ABSOLUTE: NORMAL
NEUTROPHILS RELATIVE PERCENT: NORMAL
PLATELET # BLD: NORMAL 10*3/UL
PMV BLD AUTO: NORMAL FL
RBC # BLD: NORMAL 10*6/UL
WBC # BLD: NORMAL 10*3/UL

## 2021-01-07 ENCOUNTER — TELEPHONE (OUTPATIENT)
Dept: PRIMARY CARE CLINIC | Age: 41
End: 2021-01-07

## 2021-01-07 NOTE — TELEPHONE ENCOUNTER
Pt is developing a rash from the adhesive from her heart monitor. She said that you prescribed something for her last time this happened but she can not remember the name of the med. She wants to know if you can send something in for her. Pt uses [facility] in [place].

## 2021-01-07 NOTE — TELEPHONE ENCOUNTER
The patient can use over-the-counter topical steroids and try to use paper tape usually gives less allergy.

## 2021-01-08 ENCOUNTER — TELEPHONE (OUTPATIENT)
Dept: PRIMARY CARE CLINIC | Age: 41
End: 2021-01-08

## 2021-01-08 DIAGNOSIS — I10 ESSENTIAL HYPERTENSION: Primary | ICD-10-CM

## 2021-01-08 LAB
BASOPHILS ABSOLUTE: 0.06 /ΜL
BASOPHILS RELATIVE PERCENT: 0.9 %
EOSINOPHILS ABSOLUTE: 0.2 /ΜL
EOSINOPHILS RELATIVE PERCENT: 3.1 %
HCT VFR BLD CALC: 41.3 % (ref 36–46)
HEMOGLOBIN: 13.5 G/DL (ref 12–16)
LYMPHOCYTES ABSOLUTE: 1.88 /ΜL
LYMPHOCYTES RELATIVE PERCENT: 28.9 %
MCH RBC QN AUTO: 29.9 PG
MCHC RBC AUTO-ENTMCNC: 32.7 G/DL
MCV RBC AUTO: 91.4 FL
MONOCYTES ABSOLUTE: 0.55 /ΜL
MONOCYTES RELATIVE PERCENT: 8.4 %
NEUTROPHILS ABSOLUTE: 3.82 /ΜL
NEUTROPHILS RELATIVE PERCENT: 58.7 %
PDW BLD-RTO: 11.9 %
PLATELET # BLD: 319 K/ΜL
PMV BLD AUTO: 8.6 FL
RBC # BLD: 4.52 10^6/ΜL
WBC # BLD: 6.51 10^3/ML

## 2021-01-08 NOTE — TELEPHONE ENCOUNTER
Patient is at [facility] waiting to get her blood work done. She stated she is just wanting to get a CBC. The order she has is from the old office and [facility] is requesting a new order. Will you add? Please advise.     LILIANE:[phone]

## 2021-01-18 ENCOUNTER — TELEPHONE (OUTPATIENT)
Dept: ADMINISTRATIVE | Age: 41
End: 2021-01-18

## 2021-01-18 DIAGNOSIS — R53.83 FATIGUE, UNSPECIFIED TYPE: ICD-10-CM

## 2021-01-18 DIAGNOSIS — E78.2 MIXED HYPERLIPIDEMIA: ICD-10-CM

## 2021-01-18 DIAGNOSIS — D64.9 ANEMIA, UNSPECIFIED TYPE: Primary | ICD-10-CM

## 2021-01-18 DIAGNOSIS — R73.03 PREDIABETES: ICD-10-CM

## 2021-01-18 DIAGNOSIS — E55.9 VITAMIN D DEFICIENCY: ICD-10-CM

## 2021-01-18 DIAGNOSIS — I10 ESSENTIAL HYPERTENSION: ICD-10-CM

## 2021-01-18 DIAGNOSIS — Z86.73 HISTORY OF CVA (CEREBROVASCULAR ACCIDENT) WITHOUT RESIDUAL DEFICITS: ICD-10-CM

## 2021-01-21 VITALS
TEMPERATURE: 98.6 F | SYSTOLIC BLOOD PRESSURE: 118 MMHG | BODY MASS INDEX: 33.3 KG/M2 | HEART RATE: 77 BPM | DIASTOLIC BLOOD PRESSURE: 82 MMHG | WEIGHT: 194 LBS

## 2021-01-21 LAB
ALBUMIN SERPL-MCNC: 4.6 G/DL
ALP BLD-CCNC: 84 U/L
ALT SERPL-CCNC: 21 U/L
ANION GAP SERPL CALCULATED.3IONS-SCNC: 11 MMOL/L
AST SERPL-CCNC: 21 U/L
AVERAGE GLUCOSE: 120
BASOPHILS ABSOLUTE: 0.06 /ΜL
BASOPHILS RELATIVE PERCENT: 0.9 %
BILIRUB SERPL-MCNC: 0.3 MG/DL (ref 0.1–1.4)
BILIRUBIN, URINE: NEGATIVE
BLOOD, URINE: NEGATIVE
BUN BLDV-MCNC: 16 MG/DL
CALCIUM SERPL-MCNC: 10 MG/DL
CHLORIDE BLD-SCNC: 102 MMOL/L
CHOLESTEROL, TOTAL: 179 MG/DL
CHOLESTEROL/HDL RATIO: 3.81
CLARITY: CLEAR
CO2: 25 MMOL/L
COLOR: YELLOW
CREAT SERPL-MCNC: 0.68 MG/DL
EOSINOPHILS ABSOLUTE: 0.22 /ΜL
EOSINOPHILS RELATIVE PERCENT: 3.3 %
FERRITIN: 223 NG/ML (ref 9–150)
GFR CALCULATED: NORMAL
GLUCOSE BLD-MCNC: 121 MG/DL
GLUCOSE URINE: NORMAL
HBA1C MFR BLD: 5.8 %
HCT VFR BLD CALC: 42.3 % (ref 36–46)
HDLC SERPL-MCNC: 47 MG/DL (ref 35–70)
HEMOGLOBIN: 14 G/DL (ref 12–16)
KETONES, URINE: NEGATIVE
LDL CHOLESTEROL CALCULATED: 82 MG/DL (ref 0–160)
LEUKOCYTE ESTERASE, URINE: NEGATIVE
LYMPHOCYTES ABSOLUTE: 1.89 /ΜL
LYMPHOCYTES RELATIVE PERCENT: 28.1 %
MAGNESIUM: 1.9 MG/DL
MCH RBC QN AUTO: 30.5 PG
MCHC RBC AUTO-ENTMCNC: 33.1 G/DL
MCV RBC AUTO: 92.2 FL
MONOCYTES ABSOLUTE: 0.57 /ΜL
MONOCYTES RELATIVE PERCENT: 8.5 %
NEUTROPHILS ABSOLUTE: 3.97 /ΜL
NEUTROPHILS RELATIVE PERCENT: 59.2 %
NITRITE, URINE: NEGATIVE
NONHDLC SERPL-MCNC: 132 MG/DL
PDW BLD-RTO: 11.9 %
PH UA: 5 (ref 4.5–8)
PLATELET # BLD: 324 K/ΜL
PMV BLD AUTO: 8.6 FL
POTASSIUM SERPL-SCNC: 4.5 MMOL/L
PROTEIN UA: NEGATIVE
RBC # BLD: 4.59 10^6/ΜL
SODIUM BLD-SCNC: 138 MMOL/L
SPECIFIC GRAVITY, URINE: 1.01
TOTAL PROTEIN: 7.4
TRIGL SERPL-MCNC: 249 MG/DL
TSH SERPL DL<=0.05 MIU/L-ACNC: 0.93 UIU/ML
UROBILINOGEN, URINE: NORMAL
VITAMIN B-12: 1468
VITAMIN D 25-HYDROXY: 46.5
VITAMIN D2, 25 HYDROXY: NORMAL
VITAMIN D3,25 HYDROXY: NORMAL
VLDLC SERPL CALC-MCNC: 50 MG/DL
WBC # BLD: 6.73 10^3/ML

## 2021-01-28 ENCOUNTER — VIRTUAL VISIT (OUTPATIENT)
Dept: PRIMARY CARE CLINIC | Age: 41
End: 2021-01-28
Payer: COMMERCIAL

## 2021-01-28 DIAGNOSIS — I10 ESSENTIAL HYPERTENSION: ICD-10-CM

## 2021-01-28 DIAGNOSIS — R73.03 PREDIABETES: ICD-10-CM

## 2021-01-28 DIAGNOSIS — I63.9 CRYPTOGENIC STROKE (HCC): Primary | ICD-10-CM

## 2021-01-28 DIAGNOSIS — R53.83 FATIGUE, UNSPECIFIED TYPE: ICD-10-CM

## 2021-01-28 DIAGNOSIS — K21.9 GASTROESOPHAGEAL REFLUX DISEASE WITHOUT ESOPHAGITIS: ICD-10-CM

## 2021-01-28 DIAGNOSIS — D64.9 ANEMIA, UNSPECIFIED TYPE: ICD-10-CM

## 2021-01-28 DIAGNOSIS — Z86.73 HISTORY OF CVA (CEREBROVASCULAR ACCIDENT) WITHOUT RESIDUAL DEFICITS: ICD-10-CM

## 2021-01-28 DIAGNOSIS — E78.1 PURE HYPERTRIGLYCERIDEMIA: ICD-10-CM

## 2021-01-28 DIAGNOSIS — E78.2 MIXED HYPERLIPIDEMIA: ICD-10-CM

## 2021-01-28 DIAGNOSIS — E66.09 CLASS 1 OBESITY DUE TO EXCESS CALORIES WITHOUT SERIOUS COMORBIDITY WITH BODY MASS INDEX (BMI) OF 33.0 TO 33.9 IN ADULT: ICD-10-CM

## 2021-01-28 PROBLEM — G47.9 DISTURBANCE IN SLEEP BEHAVIOR: Status: ACTIVE | Noted: 2017-12-13

## 2021-01-28 PROBLEM — K76.0 FATTY LIVER: Status: ACTIVE | Noted: 2018-02-12

## 2021-01-28 PROBLEM — R00.1 BRADYCARDIA, UNSPECIFIED: Status: ACTIVE | Noted: 2019-10-25

## 2021-01-28 PROCEDURE — 99214 OFFICE O/P EST MOD 30 MIN: CPT | Performed by: INTERNAL MEDICINE

## 2021-01-28 RX ORDER — LISINOPRIL 2.5 MG/1
TABLET ORAL
COMMUNITY
Start: 2020-12-14 | End: 2021-01-28 | Stop reason: SDUPTHER

## 2021-01-28 RX ORDER — ROSUVASTATIN CALCIUM 5 MG/1
5 TABLET, COATED ORAL NIGHTLY
Qty: 30 TABLET | Refills: 3 | Status: SHIPPED
Start: 2021-01-28 | End: 2021-04-13

## 2021-01-28 RX ORDER — LISINOPRIL 2.5 MG/1
TABLET ORAL
Qty: 30 TABLET | Refills: 5 | Status: SHIPPED
Start: 2021-01-28 | End: 2021-05-12 | Stop reason: ALTCHOICE

## 2021-01-28 ASSESSMENT — ENCOUNTER SYMPTOMS
SHORTNESS OF BREATH: 0
SORE THROAT: 0
DIARRHEA: 0
WHEEZING: 0
EYE PAIN: 0
FACIAL SWELLING: 0
SINUS PAIN: 0
ANAL BLEEDING: 0
VOICE CHANGE: 0
ABDOMINAL PAIN: 0
CHOKING: 0
EYE DISCHARGE: 0
VOMITING: 0
CONSTIPATION: 0
CHEST TIGHTNESS: 0

## 2021-01-28 ASSESSMENT — PATIENT HEALTH QUESTIONNAIRE - PHQ9
1. LITTLE INTEREST OR PLEASURE IN DOING THINGS: 0
SUM OF ALL RESPONSES TO PHQ QUESTIONS 1-9: 0

## 2021-01-28 NOTE — PROGRESS NOTES
Iris Aragon (:  1980) is a 37 y.o. female,Established patient, here for evaluation of the following chief complaint(s): Discuss Medications (discuss lowering vit d)      ASSESSMENT/PLAN:  1. Cryptogenic stroke ([address]  2. Pure hypertriglyceridemia  3. Essential hypertension  -     lisinopril (PRINIVIL;ZESTRIL) 2.5 MG tablet; take 1 tablet by mouth once daily, Disp-30 tablet, R-5Normal  4. Gastroesophageal reflux disease without esophagitis  5. Prediabetes  6. Class 1 obesity due to excess calories without serious comorbidity with body mass index (BMI) of 33.0 to 33.9 in adult  vitamin D level patient vitamin D on her recent lab was 46  hemoglobin is 14  B12 level is 1400  BUN and creatinine is normal  liver function is normal, pt. Off statin  LDL 82  triglycerides more than 200    Return in about 3 months (around 2021). SUBJECTIVE/OBJECTIVE:  HPI patient is feeling well she denies any new problems she did her blood work ,will start a small dose crestor,monitor LFT& trigs    Review of Systems   Constitutional: Negative for activity change, appetite change, chills, diaphoresis and fever. HENT: Negative for congestion, ear pain, facial swelling, sinus pain, sore throat and voice change. Eyes: Negative for pain, discharge and visual disturbance. Respiratory: Negative for choking, chest tightness, shortness of breath and wheezing. Cardiovascular: Negative for chest pain and palpitations. Gastrointestinal: Negative for abdominal pain, anal bleeding, constipation, diarrhea and vomiting. Genitourinary: Negative for difficulty urinating, dysuria, flank pain and hematuria. Musculoskeletal: Negative for arthralgias, gait problem and neck stiffness. Skin: Negative for rash and wound. Neurological: Negative for tremors, syncope, facial asymmetry, speech difficulty, weakness and light-headedness. Hematological: Negative for adenopathy.    Psychiatric/Behavioral: Negative for agitation, confusion and hallucinations. Patient-Reported Vitals 1/28/2021   Patient-Reported Weight 182lb   Patient-Reported Height 5'4\"   Patient-Reported Systolic 788   Patient-Reported Diastolic 80   Patient-Reported Pulse 79        Physical Exam    [INSTRUCTIONS:  \"[x]\" Indicates a positive item  \"[]\" Indicates a negative item  -- DELETE ALL ITEMS NOT EXAMINED]    Constitutional: [x] Appears well-developed and well-nourished [x] No apparent distress      [] Abnormal -     Mental status: [x] Alert and awake  [x] Oriented to person/place/time [x] Able to follow commands    [] Abnormal -     Eyes:   EOM    [x]  Normal    [] Abnormal -   Sclera  [x]  Normal    [] Abnormal -          Discharge [x]  None visible   [] Abnormal -     HENT: [x] Normocephalic, atraumatic  [] Abnormal -   [x] Mouth/Throat: Mucous membranes are moist    External Ears [x] Normal  [] Abnormal -    Neck: [x] No visualized mass [] Abnormal -     Pulmonary/Chest: [x] Respiratory effort normal   [x] No visualized signs of difficulty breathing or respiratory distress        [] Abnormal -      Musculoskeletal:   [x] Normal gait with no signs of ataxia         [x] Normal range of motion of neck        [] Abnormal -     Neurological:        [x] No Facial Asymmetry (Cranial nerve 7 motor function) (limited exam due to video visit)          [x] No gaze palsy        [] Abnormal -          Skin:        [x] No significant exanthematous lesions or discoloration noted on facial skin         [] Abnormal -            Psychiatric:       [x] Normal Affect [] Abnormal -        [x] No Hallucinations    Other pertinent observable physical exam findings:-          On this date 01/28/21 I have spent 30 min   minutes reviewing previous notes, test results and face to face (virtual) with the patient discussing the diagnosis and importance of compliance with the treatment plan as well as documenting on the day of the visit.         Iris Alvarez is a 37 y.o. female being evaluated by a Virtual Visit (video visit) encounter to address concerns as mentioned above. A caregiver was present when appropriate. Due to this being a TeleHealth encounter (During VEDCA-02 public health emergency), evaluation of the following organ systems was limited: Vitals/Constitutional/EENT/Resp/CV/GI//MS/Neuro/Skin/Heme-Lymph-Imm. Pursuant to the emergency declaration under the [address] [place] [address] and the [employer] and [employer] Act, this Virtual Visit was conducted with patient's (and/or legal guardian's) consent, to reduce the patient's risk of exposure to COVID-19 and provide necessary medical care. The patient (and/or legal guardian) has also been advised to contact this office for worsening conditions or problems, and seek emergency medical treatment and/or call 911 if deemed necessary. Patient identification was verified at the start of the visit: Yes    Services were provided through a video synchronous discussion virtually to substitute for in-person clinic visit. Patient was located at home and provider was located in office or at home. An electronic signature was used to authenticate this note.     --Dana Valencia MD

## 2021-02-03 ENCOUNTER — PATIENT MESSAGE (OUTPATIENT)
Dept: PRIMARY CARE CLINIC | Age: 41
End: 2021-02-03

## 2021-02-03 DIAGNOSIS — R35.0 FREQUENCY OF URINATION: Primary | ICD-10-CM

## 2021-02-03 NOTE — TELEPHONE ENCOUNTER
From: April Tom Night  To: Deandre Aj MD  Sent: 2/3/2021 2:05 PM EST  Subject: Visit Follow-Up Question    Good afternoon,   Can  send an order for a urinalysis to [facility] in [place]? The fax number is [phone]. Im having frequency, and lower back pain.  Thank you

## 2021-02-04 ENCOUNTER — TELEPHONE (OUTPATIENT)
Dept: PRIMARY CARE CLINIC | Age: 41
End: 2021-02-04

## 2021-02-04 ENCOUNTER — OFFICE VISIT (OUTPATIENT)
Dept: PRIMARY CARE CLINIC | Age: 41
End: 2021-02-04
Payer: COMMERCIAL

## 2021-02-04 VITALS
HEART RATE: 111 BPM | OXYGEN SATURATION: 98 % | TEMPERATURE: 97.7 F | BODY MASS INDEX: 33.12 KG/M2 | SYSTOLIC BLOOD PRESSURE: 126 MMHG | DIASTOLIC BLOOD PRESSURE: 76 MMHG | RESPIRATION RATE: 18 BRPM | HEIGHT: 64 IN | WEIGHT: 194 LBS

## 2021-02-04 DIAGNOSIS — N83.209 CYST OF OVARY, UNSPECIFIED LATERALITY: ICD-10-CM

## 2021-02-04 DIAGNOSIS — K44.9 HIATAL HERNIA: ICD-10-CM

## 2021-02-04 DIAGNOSIS — R35.0 FREQUENCY OF URINATION: Primary | ICD-10-CM

## 2021-02-04 DIAGNOSIS — K29.50 CHRONIC GASTRITIS WITHOUT BLEEDING, UNSPECIFIED GASTRITIS TYPE: ICD-10-CM

## 2021-02-04 DIAGNOSIS — Z87.11 HISTORY OF PEPTIC ULCER: ICD-10-CM

## 2021-02-04 DIAGNOSIS — Z86.73 HISTORY OF CVA (CEREBROVASCULAR ACCIDENT) WITHOUT RESIDUAL DEFICITS: ICD-10-CM

## 2021-02-04 DIAGNOSIS — G89.29 CHRONIC LOW BACK PAIN WITHOUT SCIATICA, UNSPECIFIED BACK PAIN LATERALITY: ICD-10-CM

## 2021-02-04 DIAGNOSIS — M54.50 CHRONIC LOW BACK PAIN WITHOUT SCIATICA, UNSPECIFIED BACK PAIN LATERALITY: ICD-10-CM

## 2021-02-04 DIAGNOSIS — R73.03 PREDIABETES: ICD-10-CM

## 2021-02-04 LAB
BILIRUBIN, POC: NEGATIVE
BLOOD URINE, POC: NORMAL
CLARITY, POC: CLEAR
COLOR, POC: YELLOW
GLUCOSE URINE, POC: NEGATIVE
KETONES, POC: NEGATIVE
LEUKOCYTE EST, POC: NEGATIVE
NITRITE, POC: NEGATIVE
PH, POC: 5.5
PROTEIN, POC: NORMAL
SPECIFIC GRAVITY, POC: >=1.03
UROBILINOGEN, POC: 0.2

## 2021-02-04 PROCEDURE — 81002 URINALYSIS NONAUTO W/O SCOPE: CPT | Performed by: INTERNAL MEDICINE

## 2021-02-04 PROCEDURE — 99214 OFFICE O/P EST MOD 30 MIN: CPT | Performed by: INTERNAL MEDICINE

## 2021-02-04 RX ORDER — OMEPRAZOLE 20 MG/1
20 CAPSULE, DELAYED RELEASE ORAL 2 TIMES DAILY PRN
Qty: 60 CAPSULE | Refills: 3 | Status: SHIPPED
Start: 2021-02-04 | End: 2021-04-28 | Stop reason: ALTCHOICE

## 2021-02-04 NOTE — PROGRESS NOTES
HPI:  Patient comes in today for pain in the epigastric area, patient feels nauseous and feels that the stomach is in a knot. No vomiting, symptoms are bad for the past 3 days, patient has history of chronic gastritis  Patient states she has been taking the omeprazole 20 mg daily and she feels she can benefit from taking more. .  Patient has chronic lower back pain and is going frequently to the bathroom no fever but had some chills last night.   Past Medical History:   Diagnosis Date    Cervicalgia     Diverticular disease     Gastritis     Hyperlipidemia     Hypertension     Nontoxic multinodular goiter     Prediabetes     Unspecified cerebral artery occlusion with cerebral infarction     Vertigo     Vitamin B12 deficiency      Past Surgical History:   Procedure Laterality Date    ABDOMINOPLASTY  2011    SIGMOIDOSCOPY      UPPER GASTROINTESTINAL ENDOSCOPY       Family History   Problem Relation Age of Onset    High Cholesterol Mother     Parkinsonism Mother     High Blood Pressure Mother     High Blood Pressure Father     Diabetes Father       Social History     Tobacco Use    Smoking status: Never Smoker    Smokeless tobacco: Never Used   Substance Use Topics    Alcohol use: No    Drug use: No        Current Outpatient Medications on File Prior to Visit   Medication Sig Dispense Refill    lisinopril (PRINIVIL;ZESTRIL) 2.5 MG tablet take 1 tablet by mouth once daily 30 tablet 5    Cyanocobalamin (VITAMIN B 12 PO) Take by mouth      rosuvastatin (CRESTOR) 5 MG tablet Take 1 tablet by mouth nightly 30 tablet 3    acetaminophen (TYLENOL) 500 MG tablet Take 500 mg by mouth every 6 hours as needed for Pain (tid daily currently x 1 month)      omeprazole (PRILOSEC) 40 MG delayed release capsule Take 40 mg by mouth daily      Cholecalciferol (VITAMIN D3) 5000 UNITS TABS Take 1 tablet by mouth daily      aspirin 81 MG tablet Take 81 mg by mouth nightly        No current facility-administered medications on file prior to visit. PE:  VS:  /76   Pulse 111   Temp 97.7 °F (36.5 °C)   Resp 18   Ht 5' 4\" (1.626 m)   Wt 194 lb (88 kg)   SpO2 98%   BMI 33.30 kg/m²   General:  Alert and oriented, NAD  HEENT:Auricles are normal, KALEN, EOMI, Conjunctivae clear       Throat currently clear. NECK:  Supple without adenopathy or thyromegaly, no carotid bruits. LUNGS:  CTA all fields  HEART:  RRR without M, R, or G  ABDOMEN:  Soft and positive epigastric tenderness and also some discomfort in the suprapubic area with palpation, without palpable masses, No renal or aortic bruits. Rectal exam: Normal sphincter tone and stools normal in color and tested Hemoccult negative  EXTREMITIES:  Without clubbing, cyanosis, or edema, 2+ DP/PT pulses B    Lab Results   Component Value Date    WBC 6.73 01/21/2021    HGB 14.0 01/21/2021    HCT 42.3 01/21/2021     01/21/2021    CHOL 179 01/21/2021    TRIG 249 01/21/2021    HDL 47 01/21/2021    ALT 21 01/21/2021    AST 21 01/21/2021     01/21/2021    K 4.5 01/21/2021     01/21/2021    CREATININE 0.68 01/21/2021    BUN 16 01/21/2021    CO2 25 01/21/2021    TSH 0.934 01/21/2021    INR 1.0 11/07/2019    LABA1C 5.8 01/21/2021        Impression/Plan:    April was seen today for abdominal pain and urinary frequency.     Diagnoses and all orders for this visit:    Frequency of urination  -     POCT Urinalysis no Micro    Chronic gastritis without bleeding, unspecified gastritis type    Cyst of ovary, unspecified laterality    Chronic low back pain without sciatica, unspecified back pain laterality    History of peptic ulcer    History of CVA (cerebrovascular accident) without residual deficits    Hiatal hernia    Prediabetes    Patient to increase the Prilosec to 20 mg twice daily  Avoid any food that flares her gastritis, the patient also advised that being on the aspirin is needed because of her history of cerebrovascular stroke but need to take it in the middle of her biggest meal with 8 ounce water. Patient will avoid taking any other NSAIDs no Advil Aleve or ibuprofen etc., okay to take some acetaminophen for the low back pain. Weight loss is going to improve her prediabetes state and will also likely to have a very good effect on her acid reflux since patient has a documented hiatal hernia and has history of peptic ulcer in the past.  Last EGD was approximately a year and a half in 2019 was done with Dr. Greta Cobos, patient was seen at [facility] in the past and states her symptoms have improved with taking antibiotics  Patient will call if her symptoms are not better and she would likely require a repeat EGD in the future if continues to be symptomatic, patient will want to follow at the [place] clinic.

## 2021-02-04 NOTE — TELEPHONE ENCOUNTER
Pt is requesting orders for a CBC and a urinalysis.   She will be having them done at [facility] fax# [phone]

## 2021-02-11 ENCOUNTER — VIRTUAL VISIT (OUTPATIENT)
Dept: PRIMARY CARE CLINIC | Age: 41
End: 2021-02-11
Payer: COMMERCIAL

## 2021-02-11 DIAGNOSIS — R35.0 FREQUENCY OF URINATION: ICD-10-CM

## 2021-02-11 DIAGNOSIS — G89.29 CHRONIC DENTAL PAIN: ICD-10-CM

## 2021-02-11 DIAGNOSIS — J32.0 CHRONIC MAXILLARY SINUSITIS: ICD-10-CM

## 2021-02-11 DIAGNOSIS — R68.83 CHILLS: Primary | ICD-10-CM

## 2021-02-11 DIAGNOSIS — K08.9 CHRONIC DENTAL PAIN: ICD-10-CM

## 2021-02-11 PROCEDURE — 99213 OFFICE O/P EST LOW 20 MIN: CPT | Performed by: INTERNAL MEDICINE

## 2021-02-11 NOTE — PROGRESS NOTES
Imani Farah (:  1980) is a 37 y.o. female,Established patient, here for evaluation of the following chief complaint(s): Fever (x1 week ), Chills, and Headache      ASSESSMENT/PLAN:  1. Chills  -     Culture, Blood 2; Future  -     Culture, Urine; Future  -     CT SINUS WO CONTRAST; Future  2. Chronic maxillary sinusitis  -     CT SINUS WO CONTRAST; Future  3. Frequency of urination  -     Culture, Blood 2; Future  -     Culture, Urine; Future  4. Chronic dental pain      No follow-ups on file. SUBJECTIVE/OBJECTIVE:  HPI: Patient requested a virtual visit because of intermittent chills  Patient is concerned about an infection  She continues to have nausea and feels she has to vomit  Burning in the abdomen is now better with using the omeprazole twice daily  The patient states that she has intermittent pain in her first molar, fifth tooth in the upper left side, this hurts intermittently she had x-rays on it and evaluation by the dentist that was more than a year ago and there was no infection at that time. She had this flare about 2 weeks ago when she had sinus drainage, and was told that the root of the tooth is too close to her sinuses.   Review of Systems    Patient-Reported Vitals 2021   Patient-Reported Weight 194lbs   Patient-Reported Height 5' 4\"   Patient-Reported Systolic 563   Patient-Reported Diastolic 70   Patient-Reported Pulse 83bpm   Patient-Reported Temperature 97.1   Patient-Reported SpO2 98%        Physical Exam    [INSTRUCTIONS:  \"[x]\" Indicates a positive item  \"[]\" Indicates a negative item  -- DELETE ALL ITEMS NOT EXAMINED]    Constitutional: [x] Appears well-developed and well-nourished [x] No apparent distress      [] Abnormal -     Mental status: [x] Alert and awake  [x] Oriented to person/place/time [x] Able to follow commands    [] Abnormal -     Eyes:   EOM    [x]  Normal    [] Abnormal -   Sclera  [x]  Normal    [] Abnormal -          Discharge [x]  None visible   [] Abnormal -     HENT: [x] Normocephalic, atraumatic  [] Abnormal -   [x] Mouth/Throat: Mucous membranes are moist    External Ears [x] Normal  [] Abnormal -    Neck: [x] No visualized mass [] Abnormal -     Pulmonary/Chest: [x] Respiratory effort normal   [x] No visualized signs of difficulty breathing or respiratory distress        [] Abnormal -      Musculoskeletal:   [x] Normal gait with no signs of ataxia         [x] Normal range of motion of neck        [] Abnormal -     Neurological:        [x] No Facial Asymmetry (Cranial nerve 7 motor function) (limited exam due to video visit)          [x] No gaze palsy        [] Abnormal -          Skin:        [x] No significant exanthematous lesions or discoloration noted on facial skin         [] Abnormal -            Psychiatric:       [x] Normal Affect [] Abnormal -        [x] No Hallucinations    Other pertinent observable physical exam findings:-          On this date 02/11/21 I have spent minutes reviewing previous notes, test results and face to face (virtual) with the patient discussing the diagnosis and importance of compliance with the treatment plan as well as documenting on the day of the visit. Iris Burrows is a 37 y.o. female being evaluated by a Virtual Visit (video visit) encounter to address concerns as mentioned above. A caregiver was present when appropriate. Due to this being a TeleHealth encounter (During HAXQJ-59 public health emergency), evaluation of the following organ systems was limited: Vitals/Constitutional/EENT/Resp/CV/GI//MS/Neuro/Skin/Heme-Lymph-Imm. Pursuant to the emergency declaration under the [address] [place] [address], [address] [place] [address] authority and the [employer] and [employer] Act, this Virtual Visit was conducted with patient's (and/or legal guardian's) consent, to reduce the patient's risk of exposure to COVID-19 and provide necessary medical care.   The patient (and/or legal guardian) has also been advised to contact this office for worsening conditions or problems, and seek emergency medical treatment and/or call 911 if deemed necessary. Patient identification was verified at the start of the visit: Yes    Services were provided through a video synchronous discussion virtually to substitute for in-person clinic visit. Patient was located at home and provider was located in office or at home. An electronic signature was used to authenticate this note. Discussed with the patient's frequent chills she has also frequency of urination but no burning will send urine culture and blood culture and patient also has some chronic intermittent pain in her teeth some that could be a source of infection and source of chills she is advised she will schedule appointment with her dentist for further evaluation and with the chronic sinus drainage she has and with her tenderness that was demonstrated on her examination today over the maxillary sinuses we will order a CT scan of the paranasal sinuses, in the past she was told that the root of the tooth is close to the sinuses and that creates some pain. ,  Patient will keep her appointment also with the [facility] for further evaluation.   --Mona Coy MD

## 2021-02-12 LAB
BILIRUBIN, URINE: NEGATIVE
BLOOD, URINE: POSITIVE
CLARITY: ABNORMAL
COLOR: YELLOW
GLUCOSE URINE: NEGATIVE
KETONES, URINE: NEGATIVE
LEUKOCYTE ESTERASE, URINE: ABNORMAL
NITRITE, URINE: NEGATIVE
PH UA: 5 (ref 4.5–8)
PROTEIN UA: NEGATIVE
SPECIFIC GRAVITY, URINE: 1.02
UROBILINOGEN, URINE: NORMAL

## 2021-02-15 DIAGNOSIS — R35.0 FREQUENCY OF URINATION: ICD-10-CM

## 2021-02-15 DIAGNOSIS — I10 ESSENTIAL HYPERTENSION: ICD-10-CM

## 2021-02-15 DIAGNOSIS — R68.83 CHILLS: ICD-10-CM

## 2021-02-15 DIAGNOSIS — J32.0 CHRONIC MAXILLARY SINUSITIS: Primary | ICD-10-CM

## 2021-02-15 DIAGNOSIS — K21.9 GASTROESOPHAGEAL REFLUX DISEASE WITHOUT ESOPHAGITIS: ICD-10-CM

## 2021-02-18 DIAGNOSIS — R68.83 CHILLS: ICD-10-CM

## 2021-02-18 DIAGNOSIS — R35.0 FREQUENCY OF URINATION: ICD-10-CM

## 2021-03-30 ENCOUNTER — VIRTUAL VISIT (OUTPATIENT)
Dept: PRIMARY CARE CLINIC | Age: 41
End: 2021-03-30
Payer: COMMERCIAL

## 2021-03-30 ENCOUNTER — TELEPHONE (OUTPATIENT)
Dept: PRIMARY CARE CLINIC | Age: 41
End: 2021-03-30

## 2021-03-30 DIAGNOSIS — E78.5 HYPERLIPIDEMIA, UNSPECIFIED HYPERLIPIDEMIA TYPE: ICD-10-CM

## 2021-03-30 DIAGNOSIS — I10 ESSENTIAL HYPERTENSION: ICD-10-CM

## 2021-03-30 DIAGNOSIS — M25.541 ARTHRALGIA OF BOTH HANDS: ICD-10-CM

## 2021-03-30 DIAGNOSIS — S40.022A CONTUSION OF LEFT UPPER EXTREMITY, INITIAL ENCOUNTER: ICD-10-CM

## 2021-03-30 DIAGNOSIS — M25.532 PAIN OF BOTH WRIST JOINTS: ICD-10-CM

## 2021-03-30 DIAGNOSIS — M25.542 ARTHRALGIA OF BOTH HANDS: ICD-10-CM

## 2021-03-30 DIAGNOSIS — R42 DIZZINESS: ICD-10-CM

## 2021-03-30 DIAGNOSIS — M25.531 PAIN OF BOTH WRIST JOINTS: ICD-10-CM

## 2021-03-30 DIAGNOSIS — M79.10 MYALGIA: Primary | ICD-10-CM

## 2021-03-30 PROCEDURE — 99214 OFFICE O/P EST MOD 30 MIN: CPT | Performed by: INTERNAL MEDICINE

## 2021-03-30 RX ORDER — FERROUS SULFATE 325(65) MG
TABLET ORAL
COMMUNITY
Start: 2021-02-03 | End: 2022-04-26

## 2021-03-30 ASSESSMENT — ENCOUNTER SYMPTOMS
BACK PAIN: 1
RESPIRATORY NEGATIVE: 1
GASTROINTESTINAL NEGATIVE: 1

## 2021-03-30 NOTE — TELEPHONE ENCOUNTER
Pt called in to cancel her appt, she needs to be seen for her blood pressure and due to transportation issues, she would like to know if she can have a VV? Is it okay to do a VV appt for blood pressure? Please, advise. Thank you.

## 2021-03-30 NOTE — ASSESSMENT & PLAN NOTE
With prior history of CVA patient need to be on a statin medication, question of myalgia related to the statin, patient indicates that usually she does not get muscle aches on the 5 mg of rosuvastatin (she was on 20 mg in the past).

## 2021-03-30 NOTE — PROGRESS NOTES
Iris Aragon (:  1980) is a 37 y.o. female,Established patient, here for evaluation of the following chief complaint(s): Hypotension      ASSESSMENT/PLAN:  1. Myalgia  -     C-Reactive Protein; Standing  -     TSH without Reflex; Standing  -     Lipid Panel; Standing  -     Hemoglobin A1C; Standing  -     Albumin, Random Urine; Standing  -     CBC Auto Differential; Standing  -     Comprehensive Metabolic Panel; Standing  -     ANGUS; Future  -     Sedimentation Rate; Future  -     Rheumatoid Factor; Future  -     C-Reactive Protein; Future  -     Uric Acid; Future  -     Ferritin; Future  -     BASIC METABOLIC PANEL; Future  -     CBC Auto Differential; Future  2. Essential hypertension  -     TSH without Reflex; Standing  -     Lipid Panel; Standing  -     Hemoglobin A1C; Standing  -     Albumin, Random Urine; Standing  -     CBC Auto Differential; Standing  -     Comprehensive Metabolic Panel; Standing  -     ANGUS; Future  -     Sedimentation Rate; Future  -     Rheumatoid Factor; Future  -     C-Reactive Protein; Future  -     Uric Acid; Future  -     Ferritin; Future  -     BASIC METABOLIC PANEL; Future  -     CBC Auto Differential; Future  3. Arthralgia of both hands  -     C-Reactive Protein; Standing  -     TSH without Reflex; Standing  -     Lipid Panel; Standing  -     Hemoglobin A1C; Standing  -     Albumin, Random Urine; Standing  -     CBC Auto Differential; Standing  -     Comprehensive Metabolic Panel; Standing  -     ANGUS; Future  -     Sedimentation Rate; Future  -     Rheumatoid Factor; Future  -     C-Reactive Protein; Future  -     Uric Acid; Future  -     Ferritin; Future  -     BASIC METABOLIC PANEL; Future  -     CBC Auto Differential; Future  4.  Pain of both wrist joints  -     C-Reactive Protein; Standing  -     TSH without Reflex; Standing  -     Lipid Panel; Standing  -     Hemoglobin A1C; Standing  -     Albumin, Random Urine; Standing  -     CBC Auto Differential; Standing  - Comprehensive Metabolic Panel; Standing  -     ANGUS; Future  -     Sedimentation Rate; Future  -     Rheumatoid Factor; Future  -     C-Reactive Protein; Future  -     Uric Acid; Future  -     Ferritin; Future  -     BASIC METABOLIC PANEL; Future  -     CBC Auto Differential; Future  5. Contusion of left upper extremity, initial encounter  -     TSH without Reflex; Standing  -     Lipid Panel; Standing  -     Hemoglobin A1C; Standing  -     Albumin, Random Urine; Standing  -     CBC Auto Differential; Standing  -     Comprehensive Metabolic Panel; Standing  -     ANGUS; Future  -     Sedimentation Rate; Future  -     Rheumatoid Factor; Future  -     C-Reactive Protein; Future  -     Uric Acid; Future  -     Ferritin; Future  -     BASIC METABOLIC PANEL; Future  -     CBC Auto Differential; Future  6. Dizziness  7. Hyperlipidemia, unspecified hyperlipidemia type  Assessment & Plan: With prior history of CVA patient need to be on a statin medication, question of myalgia related to the statin, patient indicates that usually she does not get muscle aches on the 5 mg of rosuvastatin (she was on 20 mg in the past). Return in about 4 weeks (around 4/27/2021). SUBJECTIVE/OBJECTIVE:  HPI: This is a virtual visit requested by the patient she has a complaint of achy muscles and achy joints her joints of her hands and the wrist are bothersome also she has been bothered by frequent bruising, cannot recall any contusions or trauma had some bruises on the breast and had some bruises also on her left upper arm, she is on aspirin 81 mg once daily and states her bruising is resolving. Patient also is concerned about her pressure fluctuating, states that when the pressure is down to about 946 systolic she usually feels somewhat dizzy and not sure what to do about it. Review of Systems   Constitutional: Negative for chills and fever. HENT: Negative. Respiratory: Negative. Cardiovascular: Negative.     Gastrointestinal: Negative. Genitourinary: Negative. Musculoskeletal: Positive for arthralgias (Hands and wrists bilaterally. And intermittently also in the ankles), back pain (At the site of the prior dorsal compression fracture.) and myalgias. Neurological: Negative. Psychiatric/Behavioral: Negative.         Patient-Reported Vitals 3/30/2021   Patient-Reported Weight 194LBS   Patient-Reported Height 5' 4\"   Patient-Reported Systolic 267   Patient-Reported Diastolic 74   Patient-Reported Pulse 78   Patient-Reported Temperature 98.4   Patient-Reported SpO2 98%        Physical Exam    [INSTRUCTIONS:  \"[x]\" Indicates a positive item  \"[]\" Indicates a negative item  -- DELETE ALL ITEMS NOT EXAMINED]    Constitutional: [x] Appears well-developed and well-nourished [x] No apparent distress      [] Abnormal -     Mental status: [x] Alert and awake  [x] Oriented to person/place/time [x] Able to follow commands    [] Abnormal -     Eyes:   EOM    [x]  Normal    [] Abnormal -   Sclera  [x]  Normal    [] Abnormal -          Discharge [x]  None visible   [] Abnormal -     HENT: [x] Normocephalic, atraumatic  [] Abnormal -   [x] Mouth/Throat: Mucous membranes are moist    External Ears [x] Normal  [] Abnormal -    Neck: [x] No visualized mass [] Abnormal -     Pulmonary/Chest: [x] Respiratory effort normal   [x] No visualized signs of difficulty breathing or respiratory distress        [] Abnormal -      Musculoskeletal:   [] Normal gait with no signs of ataxia         [x] Normal range of motion of neck        [] Abnormal -     Neurological:        [x] No Facial Asymmetry (Cranial nerve 7 motor function) (limited exam due to video visit)          [x] No gaze palsy        [] Abnormal -          Skin:        [x] No significant exanthematous lesions or discoloration noted on facial skin         [] Abnormal -            Psychiatric:       [x] Normal Affect [] Abnormal -        [x] No Hallucinations    Other pertinent observable physical exam findings:-          On this date 3/30/2021 I have spent 36  minutes reviewing previous notes, test results and face to face (virtual) with the patient discussing the diagnosis and importance of compliance with the treatment plan as well as documenting on the day of the visit. Julianne Del Valle, was evaluated through a synchronous (real-time) audio-video encounter. The patient (or guardian if applicable) is aware that this is a billable service. Verbal consent to proceed has been obtained within the past 12 months. The visit was conducted pursuant to the emergency declaration under the [address] [facility], [address] [place] [address] authority and the [employer] Act. Patient identification was verified, and a caregiver was present when appropriate. The patient was located in a state where the provider was credentialed to provide care. The patient is advised to drink fluid when the pressure is low and hydrate also can add some salt crackers or drink some Gatorade and monitor her symptoms. Orders to further evaluate arthralgia and myalgia  Patient on rosuvastatin for hyperlipidemia will consider discontinuing the medicine if inflammatory markers are normal.  Patient had a mild elevation in ferritin in January to 223, prior baseline was 95, this indicates some elevation in inflammatory markers. Advised bruising could be related to aspirin, patient need to continue with the 81 mg aspirin due to her prior history of CVA. Patient has questions regarding different kinds of Covid vaccination, she is counseled she is interested in receiving the Lemus Peter vaccine, she will call and schedule. An electronic signature was used to authenticate this note.     --Luis Manuel Goldstein MD

## 2021-04-12 DIAGNOSIS — E78.1 PURE HYPERTRIGLYCERIDEMIA: ICD-10-CM

## 2021-04-12 DIAGNOSIS — R73.03 PREDIABETES: ICD-10-CM

## 2021-04-13 ENCOUNTER — TELEPHONE (OUTPATIENT)
Dept: PRIMARY CARE CLINIC | Age: 41
End: 2021-04-13

## 2021-04-13 DIAGNOSIS — R35.0 FREQUENCY OF URINATION: Primary | ICD-10-CM

## 2021-04-13 RX ORDER — ROSUVASTATIN CALCIUM 10 MG/1
10 TABLET, COATED ORAL DAILY
Qty: 30 TABLET | Refills: 3 | Status: SHIPPED
Start: 2021-04-13 | End: 2021-04-15

## 2021-04-13 RX ORDER — ROSUVASTATIN CALCIUM 10 MG/1
10 TABLET, COATED ORAL DAILY
COMMUNITY
End: 2021-04-13 | Stop reason: SDUPTHER

## 2021-04-13 NOTE — TELEPHONE ENCOUNTER
Pt called regarding needing refills on her medication rosuvastatin (CRESTOR) 5 MG tablet. Pt stated that she is almost out and needs it filled. She also had a question about getting a urinalysis done. Please contact pt at [phone]. Thank you!

## 2021-04-15 RX ORDER — ROSUVASTATIN CALCIUM 5 MG/1
5 TABLET, COATED ORAL NIGHTLY
Qty: 30 TABLET | Refills: 3 | Status: SHIPPED
Start: 2021-04-15 | End: 2021-05-12

## 2021-04-27 ENCOUNTER — TELEPHONE (OUTPATIENT)
Dept: PRIMARY CARE CLINIC | Age: 41
End: 2021-04-27

## 2021-04-27 DIAGNOSIS — R53.83 FATIGUE, UNSPECIFIED TYPE: Primary | ICD-10-CM

## 2021-04-27 NOTE — TELEPHONE ENCOUNTER
Order placed for vitamin D 25. No reason to order B12 it was done last month and it is not deficient. No need to repeat. Please fax the order as requested.

## 2021-04-28 ENCOUNTER — VIRTUAL VISIT (OUTPATIENT)
Dept: PRIMARY CARE CLINIC | Age: 41
End: 2021-04-28
Payer: COMMERCIAL

## 2021-04-28 DIAGNOSIS — K21.9 GASTROESOPHAGEAL REFLUX DISEASE, UNSPECIFIED WHETHER ESOPHAGITIS PRESENT: Primary | ICD-10-CM

## 2021-04-28 DIAGNOSIS — I10 ESSENTIAL HYPERTENSION: ICD-10-CM

## 2021-04-28 DIAGNOSIS — E04.9 NON-TOXIC NODULAR GOITER: ICD-10-CM

## 2021-04-28 LAB
ALBUMIN SERPL-MCNC: 4.4 G/DL
ALP BLD-CCNC: 94 U/L
ALT SERPL-CCNC: 20 U/L
ANION GAP SERPL CALCULATED.3IONS-SCNC: 10 MMOL/L
AST SERPL-CCNC: 19 U/L
AVERAGE GLUCOSE: NORMAL
BASOPHILS ABSOLUTE: 0.06 /ΜL
BASOPHILS RELATIVE PERCENT: 0.9 %
BILIRUB SERPL-MCNC: 0.2 MG/DL (ref 0.1–1.4)
BUN BLDV-MCNC: 16 MG/DL
C-REACTIVE PROTEIN: <0.3
CALCIUM SERPL-MCNC: 9.4 MG/DL
CHLORIDE BLD-SCNC: 107 MMOL/L
CHOLESTEROL, TOTAL: 194 MG/DL
CHOLESTEROL/HDL RATIO: 4.51
CO2: 21 MMOL/L
CREAT SERPL-MCNC: 0.57 MG/DL
EOSINOPHILS ABSOLUTE: 0.17 /ΜL
EOSINOPHILS RELATIVE PERCENT: 2.5 %
GFR CALCULATED: >60
GLUCOSE BLD-MCNC: 112 MG/DL
HBA1C MFR BLD: 5.8 %
HCT VFR BLD CALC: 41.6 % (ref 36–46)
HDLC SERPL-MCNC: 43 MG/DL (ref 35–70)
HEMOGLOBIN: 13.5 G/DL (ref 12–16)
LDL CHOLESTEROL CALCULATED: 96 MG/DL (ref 0–160)
LYMPHOCYTES ABSOLUTE: 1.94 /ΜL
LYMPHOCYTES RELATIVE PERCENT: 28.2 %
MCH RBC QN AUTO: 29.9 PG
MCHC RBC AUTO-ENTMCNC: 32.5 G/DL
MCV RBC AUTO: 92 FL
MICROALBUMIN UR-MCNC: <12
MONOCYTES ABSOLUTE: 0.69 /ΜL
MONOCYTES RELATIVE PERCENT: 10 %
NEUTROPHILS ABSOLUTE: 4.01 /ΜL
NEUTROPHILS RELATIVE PERCENT: 58.4 %
NONHDLC SERPL-MCNC: 151 MG/DL
PDW BLD-RTO: 11.9 %
PLATELET # BLD: 317 K/ΜL
PMV BLD AUTO: 8.9 FL
POTASSIUM SERPL-SCNC: 4.3 MMOL/L
RBC # BLD: 4.52 10^6/ΜL
SODIUM BLD-SCNC: 138 MMOL/L
TOTAL PROTEIN: 7.1
TRIGL SERPL-MCNC: 276 MG/DL
TSH SERPL DL<=0.05 MIU/L-ACNC: 1.59 UIU/ML
VLDLC SERPL CALC-MCNC: 55 MG/DL
WBC # BLD: 6.89 10^3/ML

## 2021-04-28 PROCEDURE — 99213 OFFICE O/P EST LOW 20 MIN: CPT | Performed by: INTERNAL MEDICINE

## 2021-04-28 RX ORDER — SUCRALFATE 1 G/1
1 TABLET ORAL 4 TIMES DAILY
Qty: 120 TABLET | Refills: 5 | Status: SHIPPED
Start: 2021-04-28 | End: 2022-03-01 | Stop reason: SDUPTHER

## 2021-04-28 RX ORDER — OMEPRAZOLE 40 MG/1
40 CAPSULE, DELAYED RELEASE ORAL
Qty: 30 CAPSULE | Refills: 0 | Status: SHIPPED
Start: 2021-04-28 | End: 2021-05-24

## 2021-04-28 SDOH — ECONOMIC STABILITY: FOOD INSECURITY: WITHIN THE PAST 12 MONTHS, YOU WORRIED THAT YOUR FOOD WOULD RUN OUT BEFORE YOU GOT MONEY TO BUY MORE.: NEVER TRUE

## 2021-04-28 SDOH — ECONOMIC STABILITY: TRANSPORTATION INSECURITY
IN THE PAST 12 MONTHS, HAS LACK OF TRANSPORTATION KEPT YOU FROM MEETINGS, WORK, OR FROM GETTING THINGS NEEDED FOR DAILY LIVING?: NO

## 2021-04-28 SDOH — ECONOMIC STABILITY: INCOME INSECURITY: HOW HARD IS IT FOR YOU TO PAY FOR THE VERY BASICS LIKE FOOD, HOUSING, MEDICAL CARE, AND HEATING?: NOT HARD AT ALL

## 2021-04-28 SDOH — ECONOMIC STABILITY: FOOD INSECURITY: WITHIN THE PAST 12 MONTHS, THE FOOD YOU BOUGHT JUST DIDN'T LAST AND YOU DIDN'T HAVE MONEY TO GET MORE.: NEVER TRUE

## 2021-04-28 NOTE — PROGRESS NOTES
Valentina Richter (:  1980) is a 37 y.o. female,Established patient, here for evaluation of the following chief complaint(s): Abdominal Pain and Nausea  I feel the same as when I had the ulcer in 2019,no tarry stools,Taking the prilosec    ASSESSMENT/PLAN:  1. Gastroesophageal reflux disease, unspecified whether esophagitis present  2. Essential hypertension  3. Non-toxic nodular goiter  -     US HEAD NECK SOFT TISSUE THYROID; Future      Return in about 2 weeks (around 2021). SUBJECTIVE/OBJECTIVE:  HPIHeart burn for the past week especially with large meals. HX of PUD 2019    Review of Systems   HENT: Negative. Respiratory: Negative. Cardiovascular: Negative. Gastrointestinal: Positive for abdominal pain and nausea. Musculoskeletal: Negative. Neurological: Negative. Psychiatric/Behavioral: Negative.         Patient-Reported Vitals 2021   Patient-Reported Weight 194   Patient-Reported Height 5' 4\"   Patient-Reported Systolic 411   Patient-Reported Diastolic 83   Patient-Reported Pulse 82   Patient-Reported Temperature 98.1   Patient-Reported SpO2 98        Physical Exam    [INSTRUCTIONS:  \"[x]\" Indicates a positive item  \"[]\" Indicates a negative item  -- DELETE ALL ITEMS NOT EXAMINED]    Constitutional: [x] Appears well-developed and well-nourished [x] No apparent distress      [] Abnormal -     Mental status: [x] Alert and awake  [x] Oriented to person/place/time [x] Able to follow commands    [] Abnormal -     Eyes:   EOM    [x]  Normal    [] Abnormal -   Sclera  [x]  Normal    [] Abnormal -          Discharge [x]  None visible   [] Abnormal -     HENT: [x] Normocephalic, atraumatic  [] Abnormal -   [x] Mouth/Throat: Mucous membranes are moist    External Ears [x] Normal  [] Abnormal -    Neck: [x] No visualized mass [] Abnormal -     Pulmonary/Chest: [x] Respiratory effort normal   [x] No visualized signs of difficulty breathing or respiratory distress        [] Abnormal - Musculoskeletal:   [x] Normal gait with no signs of ataxia         [x] Normal range of motion of neck        [] Abnormal -     Neurological:        [x] No Facial Asymmetry (Cranial nerve 7 motor function) (limited exam due to video visit)          [x] No gaze palsy        [] Abnormal -          Skin:        [x] No significant exanthematous lesions or discoloration noted on facial skin         [] Abnormal -            Psychiatric:       [x] Normal Affect [] Abnormal -        [x] No Hallucinations    Other pertinent observable physical exam findings:-                April Latricia Grayson, was evaluated through a synchronous (real-time) audio-video encounter. The patient (or guardian if applicable) is aware that this is a billable service. Verbal consent to proceed has been obtained within the past 12 months. The visit was conducted pursuant to the emergency declaration under the [address] [place] [address] authority and the [employer] Act. Patient identification was verified, and a caregiver was present when appropriate. The patient was located in a state where the provider was credentialed to provide care. An electronic signature was used to authenticate this note.     --Alo Loya MD

## 2021-05-03 ASSESSMENT — ENCOUNTER SYMPTOMS
ABDOMINAL PAIN: 1
RESPIRATORY NEGATIVE: 1
NAUSEA: 1

## 2021-05-12 ENCOUNTER — OFFICE VISIT (OUTPATIENT)
Dept: PRIMARY CARE CLINIC | Age: 41
End: 2021-05-12
Payer: COMMERCIAL

## 2021-05-12 VITALS
BODY MASS INDEX: 34.83 KG/M2 | OXYGEN SATURATION: 98 % | SYSTOLIC BLOOD PRESSURE: 128 MMHG | WEIGHT: 204 LBS | HEIGHT: 64 IN | DIASTOLIC BLOOD PRESSURE: 88 MMHG | RESPIRATION RATE: 18 BRPM | TEMPERATURE: 97.9 F | HEART RATE: 94 BPM

## 2021-05-12 DIAGNOSIS — M79.10 MYALGIA: ICD-10-CM

## 2021-05-12 DIAGNOSIS — R00.2 PALPITATION: ICD-10-CM

## 2021-05-12 DIAGNOSIS — K44.9 HIATAL HERNIA: ICD-10-CM

## 2021-05-12 DIAGNOSIS — I10 ESSENTIAL HYPERTENSION: ICD-10-CM

## 2021-05-12 DIAGNOSIS — R35.0 FREQUENCY OF URINATION: ICD-10-CM

## 2021-05-12 DIAGNOSIS — K21.9 GASTROESOPHAGEAL REFLUX DISEASE, UNSPECIFIED WHETHER ESOPHAGITIS PRESENT: Primary | ICD-10-CM

## 2021-05-12 DIAGNOSIS — R73.03 PREDIABETES: ICD-10-CM

## 2021-05-12 DIAGNOSIS — E78.2 MIXED HYPERLIPIDEMIA: ICD-10-CM

## 2021-05-12 LAB
BILIRUBIN, POC: NORMAL
BLOOD URINE, POC: NORMAL
CLARITY, POC: CLEAR
COLOR, POC: YELLOW
GLUCOSE URINE, POC: NORMAL
KETONES, POC: NORMAL
LEUKOCYTE EST, POC: NORMAL
NITRITE, POC: NORMAL
PH, POC: 6
PROTEIN, POC: NORMAL
SPECIFIC GRAVITY, POC: 1.02
UROBILINOGEN, POC: NORMAL

## 2021-05-12 PROCEDURE — 99214 OFFICE O/P EST MOD 30 MIN: CPT | Performed by: INTERNAL MEDICINE

## 2021-05-12 PROCEDURE — 81002 URINALYSIS NONAUTO W/O SCOPE: CPT | Performed by: INTERNAL MEDICINE

## 2021-05-12 RX ORDER — ROSUVASTATIN CALCIUM 10 MG/1
TABLET, COATED ORAL
COMMUNITY
Start: 2021-05-09 | End: 2021-05-12 | Stop reason: ALTCHOICE

## 2021-05-12 RX ORDER — METOPROLOL SUCCINATE 25 MG/1
12.5 TABLET, EXTENDED RELEASE ORAL DAILY
Qty: 45 TABLET | Refills: 1 | Status: SHIPPED
Start: 2021-05-12 | End: 2021-06-15

## 2021-05-12 RX ORDER — ROSUVASTATIN CALCIUM 20 MG/1
20 TABLET, FILM COATED ORAL DAILY
Qty: 90 TABLET | Refills: 3
Start: 2021-05-12 | End: 2021-05-17 | Stop reason: SDUPTHER

## 2021-05-12 ASSESSMENT — ENCOUNTER SYMPTOMS
ABDOMINAL PAIN: 1
EYES NEGATIVE: 1
CHEST TIGHTNESS: 0
SHORTNESS OF BREATH: 0
SORE THROAT: 0
RESPIRATORY NEGATIVE: 1
DIARRHEA: 1
NAUSEA: 1

## 2021-05-12 NOTE — ASSESSMENT & PLAN NOTE
Pt. Advised regarding AVOID FAT( her salad has cheeze and ransh dressing etc.. Sueellen Payment Sueellen Payment )

## 2021-05-12 NOTE — PROGRESS NOTES
HPI:  Patient comes in today for FU on the GERD/Abdominal pain. certainfoods make it worse  Frequency of urination    Heart rate is 79-90 & pt. Feels it fast,no SOB or CP. Muscle pains over the shoulders bilaterally  Review of Systems   Constitutional: Negative. HENT: Negative for congestion, ear pain and sore throat. Eyes: Negative. Respiratory: Negative. Negative for chest tightness and shortness of breath. Cardiovascular: Positive for palpitations (Feels the heart racing ,Fit bit shows heart rate up to 120). Negative for chest pain and leg swelling. Gastrointestinal: Positive for abdominal pain, diarrhea (diarrhea in the past with metformin) and nausea. Genitourinary: Positive for frequency. Musculoskeletal: Positive for arthralgias (bilateral shoulders) and myalgias. Skin: Negative. Neurological: Negative for dizziness, tremors, speech difficulty and weakness. Psychiatric/Behavioral: Negative.          Past Medical History:   Diagnosis Date    Cervicalgia     Diverticular disease     Gastritis     Hyperlipidemia     Hypertension     Nontoxic multinodular goiter     Prediabetes     Unspecified cerebral artery occlusion with cerebral infarction     Vertigo     Vitamin B12 deficiency      Past Surgical History:   Procedure Laterality Date    ABDOMINOPLASTY  2011    SIGMOIDOSCOPY      UPPER GASTROINTESTINAL ENDOSCOPY       Family History   Problem Relation Age of Onset    High Cholesterol Mother     Parkinsonism Mother     High Blood Pressure Mother     High Blood Pressure Father     Diabetes Father       Social History     Tobacco Use    Smoking status: Never Smoker    Smokeless tobacco: Never Used   Substance Use Topics    Alcohol use: No    Drug use: No        Current Outpatient Medications on File Prior to Visit   Medication Sig Dispense Refill    sucralfate (CARAFATE) 1 GM tablet Take 1 tablet by mouth 4 times daily 120 tablet 5    omeprazole (PRILOSEC) 40 MG Take 1 tablet by mouth daily, Disp-90 tablet, R-3, DAWNO PRINT  4. Prediabetes  Assessment & Plan:  Discussed metformin,pt. States had diarrhea in the past,consider jardiance if not improved by diet and wt. Loss. 5. Hiatal hernia  6. Frequency of urination  -     POCT Urinalysis no Micro  -     Urinalysis; Future  7. Palpitation  Assessment & Plan:  Start metoprolol. 8. Myalgia  -     diclofenac sodium (VOLTAREN) 1 % GEL;  Apply 2 g topically 2 times daily, Topical, 2 TIMES DAILY Starting Wed 5/12/2021, Disp-350 g, R-2, Normal

## 2021-05-12 NOTE — ASSESSMENT & PLAN NOTE
Discussed metformin,pt. States had diarrhea in the past,consider jardiance if not improved by diet and wt. Loss.

## 2021-05-13 DIAGNOSIS — M25.542 ARTHRALGIA OF BOTH HANDS: ICD-10-CM

## 2021-05-13 DIAGNOSIS — M79.10 MYALGIA: ICD-10-CM

## 2021-05-13 DIAGNOSIS — M25.532 PAIN OF BOTH WRIST JOINTS: ICD-10-CM

## 2021-05-13 DIAGNOSIS — S40.022A CONTUSION OF LEFT UPPER EXTREMITY, INITIAL ENCOUNTER: ICD-10-CM

## 2021-05-13 DIAGNOSIS — M25.541 ARTHRALGIA OF BOTH HANDS: ICD-10-CM

## 2021-05-13 DIAGNOSIS — R35.0 FREQUENCY OF URINATION: ICD-10-CM

## 2021-05-13 DIAGNOSIS — M25.531 PAIN OF BOTH WRIST JOINTS: ICD-10-CM

## 2021-05-13 DIAGNOSIS — I10 ESSENTIAL HYPERTENSION: ICD-10-CM

## 2021-05-14 ENCOUNTER — TELEPHONE (OUTPATIENT)
Dept: PRIMARY CARE CLINIC | Age: 41
End: 2021-05-14

## 2021-05-14 DIAGNOSIS — E78.2 MIXED HYPERLIPIDEMIA: ICD-10-CM

## 2021-05-17 RX ORDER — ROSUVASTATIN CALCIUM 20 MG/1
20 TABLET, FILM COATED ORAL DAILY
Qty: 90 TABLET | Refills: 3 | Status: SHIPPED
Start: 2021-05-17 | End: 2022-08-01

## 2021-05-24 RX ORDER — OMEPRAZOLE 40 MG/1
CAPSULE, DELAYED RELEASE ORAL
Qty: 90 CAPSULE | Refills: 1 | Status: SHIPPED
Start: 2021-05-24 | End: 2021-07-20

## 2021-06-15 ENCOUNTER — OFFICE VISIT (OUTPATIENT)
Dept: PRIMARY CARE CLINIC | Age: 41
End: 2021-06-15
Payer: COMMERCIAL

## 2021-06-15 VITALS
DIASTOLIC BLOOD PRESSURE: 80 MMHG | BODY MASS INDEX: 34.15 KG/M2 | OXYGEN SATURATION: 99 % | RESPIRATION RATE: 18 BRPM | HEART RATE: 103 BPM | TEMPERATURE: 97.3 F | HEIGHT: 64 IN | SYSTOLIC BLOOD PRESSURE: 128 MMHG | WEIGHT: 200 LBS

## 2021-06-15 DIAGNOSIS — R00.2 HEART PALPITATIONS: Primary | ICD-10-CM

## 2021-06-15 DIAGNOSIS — R73.03 PREDIABETES: ICD-10-CM

## 2021-06-15 DIAGNOSIS — I63.9 CRYPTOGENIC STROKE (HCC): ICD-10-CM

## 2021-06-15 DIAGNOSIS — E66.09 CLASS 1 OBESITY DUE TO EXCESS CALORIES WITH SERIOUS COMORBIDITY AND BODY MASS INDEX (BMI) OF 34.0 TO 34.9 IN ADULT: ICD-10-CM

## 2021-06-15 DIAGNOSIS — R11.0 NAUSEA: ICD-10-CM

## 2021-06-15 DIAGNOSIS — I10 ESSENTIAL HYPERTENSION: ICD-10-CM

## 2021-06-15 DIAGNOSIS — K04.7 INFECTED TOOTH: ICD-10-CM

## 2021-06-15 PROCEDURE — 99214 OFFICE O/P EST MOD 30 MIN: CPT | Performed by: INTERNAL MEDICINE

## 2021-06-15 RX ORDER — ACYCLOVIR 200 MG/1
CAPSULE ORAL
COMMUNITY
Start: 2021-05-27 | End: 2021-10-26

## 2021-06-15 RX ORDER — OMEPRAZOLE 20 MG/1
CAPSULE, DELAYED RELEASE ORAL
COMMUNITY
Start: 2021-05-05 | End: 2021-07-20

## 2021-06-15 RX ORDER — AMOXICILLIN AND CLAVULANATE POTASSIUM 875; 125 MG/1; MG/1
1 TABLET, FILM COATED ORAL 2 TIMES DAILY
Qty: 14 TABLET | Refills: 0 | Status: SHIPPED | OUTPATIENT
Start: 2021-06-15 | End: 2021-06-22

## 2021-06-15 RX ORDER — ONDANSETRON 4 MG/1
4 TABLET, FILM COATED ORAL 3 TIMES DAILY PRN
Qty: 15 TABLET | Refills: 0 | Status: SHIPPED
Start: 2021-06-15 | End: 2021-08-02

## 2021-06-15 RX ORDER — LISINOPRIL 2.5 MG/1
TABLET ORAL
COMMUNITY
Start: 2021-05-26 | End: 2022-01-24 | Stop reason: SDUPTHER

## 2021-06-15 ASSESSMENT — ENCOUNTER SYMPTOMS
NAUSEA: 1
CHEST TIGHTNESS: 0
RESPIRATORY NEGATIVE: 1
COUGH: 0
EYES NEGATIVE: 1
WHEEZING: 0

## 2021-06-15 NOTE — ASSESSMENT & PLAN NOTE
Prior Holter monitor was ordered, patient also was referred to Dr. Ana Luisa Jennings cardiology, we reviewed reports from November 2019, at that time no specific treatment was recommended or required, patient is requesting reevaluation wants to go to the cardiologist at [facility] clinic, with patient high risk history and documented stroke patient will be referred to cardiology, prior MONA showed no holes in the heart.

## 2021-06-15 NOTE — PROGRESS NOTES
2.5 MG tablet take 1 tablet by mouth once daily      omeprazole (PRILOSEC) 20 MG delayed release capsule take 1 capsule by mouth once daily      acyclovir (ZOVIRAX) 200 MG capsule take 1 capsule by mouth four times a day if needed      omeprazole (PRILOSEC) 40 MG delayed release capsule take 1 capsule by mouth EVERY MORNING BEFORE BREAKFAST 90 capsule 1    CRESTOR 20 MG tablet Take 1 tablet by mouth daily 90 tablet 3    diclofenac sodium (VOLTAREN) 1 % GEL Apply 2 g topically 2 times daily 350 g 2    sucralfate (CARAFATE) 1 GM tablet Take 1 tablet by mouth 4 times daily 120 tablet 5    FEROSUL 325 (65 Fe) MG tablet take 1 tablet by mouth every other day      Cyanocobalamin (VITAMIN B 12 PO) Take by mouth      acetaminophen (TYLENOL) 500 MG tablet Take 500 mg by mouth every 6 hours as needed for Pain (tid daily currently x 1 month)      Cholecalciferol (VITAMIN D3) 5000 UNITS TABS Take 1 tablet by mouth daily      aspirin 81 MG tablet Take 81 mg by mouth nightly        No current facility-administered medications on file prior to visit. PE:  VS:  /80 (Site: Left Upper Arm, Position: Sitting, Cuff Size: Large Adult)   Pulse 103   Temp 97.3 °F (36.3 °C)   Resp 18   Ht 5' 4\" (1.626 m)   Wt 200 lb (90.7 kg)   SpO2 99%   BMI 34.33 kg/m²   General:  Alert and oriented, NAD  HEENT: Ear auricles are normal, KALEN, EOMI, Conjunctivae clear, swelling and tenderness over the gum in the upper jaw, throat is clear. NECK:  Supple without adenopathy, there is mild thyromegaly, no carotid bruits. LUNGS:  CTA all fields, symmetrical expansion no wheezes no rales. HEART:  RRR without M, R, or G  ABDOMEN:  Soft and nontender without palpable abnormalities, No renal or aortic bruits.   EXTREMITIES:  Without clubbing, cyanosis, or edema, 2+ DP/PT pulses B    Lab Results   Component Value Date    WBC 6.89 04/27/2021    HGB 13.5 04/27/2021    HCT 41.6 04/27/2021     04/27/2021    CHOL 194 04/27/2021 TRIG 276 04/27/2021    HDL 43 04/27/2021    ALT 20 04/27/2021    AST 19 04/27/2021     04/27/2021    K 4.3 04/27/2021     04/27/2021    CREATININE 0.57 04/27/2021    BUN 16 04/27/2021    CO2 21 04/27/2021    TSH 1.590 04/27/2021    INR 1.0 11/07/2019    LABA1C 5.8 04/27/2021    LABMICR <12.0 04/27/2021        Impression/Plan:    1. Heart palpitations  Assessment & Plan:  Prior Holter monitor was ordered, patient also was referred to Dr. Alva Gutierrez cardiology, we reviewed reports from November 2019, at that time no specific treatment was recommended or required, patient is requesting reevaluation wants to go to the cardiologist at [facility] clinic, with patient high risk history and documented stroke patient will be referred to cardiology, prior MONA showed no holes in the heart. Orders:  -     External Referral To Cardiology  2. Infected tooth  -     amoxicillin-clavulanate (AUGMENTIN) 875-125 MG per tablet; Take 1 tablet by mouth 2 times daily for 7 days, Disp-14 tablet, R-0Normal  3. Essential hypertension  Assessment & Plan:  Patient will continue lisinopril, could not tolerate metoprolol. 4. Prediabetes  Assessment & Plan:  Patient counseled regarding avoiding starches , and succeeded in losing 4 to 5 pounds encouraged to continue the same. 5. Nausea  6. Class 1 obesity due to excess calories with serious comorbidity and body mass index (BMI) of 34.0 to 34.9 in adult  7.  Cryptogenic stroke [facility])  Assessment & Plan:  Patient has a documented stroke with MRI of the brain, currently have no residual.

## 2021-06-15 NOTE — ASSESSMENT & PLAN NOTE
Patient counseled regarding avoiding starches , and succeeded in losing 4 to 5 pounds encouraged to continue the same.

## 2021-06-23 DIAGNOSIS — E04.9 NON-TOXIC NODULAR GOITER: ICD-10-CM

## 2021-07-14 ENCOUNTER — TELEMEDICINE (OUTPATIENT)
Dept: PRIMARY CARE CLINIC | Age: 41
End: 2021-07-14
Payer: COMMERCIAL

## 2021-07-14 DIAGNOSIS — L03.124 ACUTE LYMPHANGITIS OF LEFT UPPER EXTREMITY: ICD-10-CM

## 2021-07-14 DIAGNOSIS — I89.1: Primary | ICD-10-CM

## 2021-07-14 PROCEDURE — 99213 OFFICE O/P EST LOW 20 MIN: CPT | Performed by: INTERNAL MEDICINE

## 2021-07-14 RX ORDER — CEPHALEXIN 500 MG/1
500 CAPSULE ORAL 4 TIMES DAILY
Qty: 28 CAPSULE | Refills: 0 | Status: SHIPPED
Start: 2021-07-14 | End: 2021-07-20 | Stop reason: SDUPTHER

## 2021-07-14 ASSESSMENT — ENCOUNTER SYMPTOMS
RESPIRATORY NEGATIVE: 1
COLOR CHANGE: 1

## 2021-07-14 NOTE — PROGRESS NOTES
Iris Aragon (:  1980) is a 37 y.o. female,Established patient, here for evaluation of the following chief complaint(s): No chief complaint on file. ASSESSMENT/PLAN:  1. Lymphangitis of axilla  -     cephALEXin (KEFLEX) 500 MG capsule; Take 1 capsule by mouth 4 times daily, Disp-28 capsule, R-0Normal  2. Acute lymphangitis of left upper extremity  -     cephALEXin (KEFLEX) 500 MG capsule; Take 1 capsule by mouth 4 times daily, Disp-28 capsule, R-0Normal      Return in about 1 week (around 2021). SUBJECTIVE/OBJECTIVE:  HPI: Rash & swelling Ltarm and axilla  Tender left breast    Review of Systems   Constitutional: Negative for chills and fever. Respiratory: Negative. Cardiovascular: Negative. Skin: Positive for color change and rash. Hematological: Positive for adenopathy (swelling in the left axilla). Psychiatric/Behavioral: Negative.         Patient-Reported Vitals 2021   Patient-Reported Weight 194   Patient-Reported Height 5' 4\"   Patient-Reported Systolic 610   Patient-Reported Diastolic 83   Patient-Reported Pulse 82   Patient-Reported Temperature 98.1   Patient-Reported SpO2 98        Physical Exam    [INSTRUCTIONS:  \"[x]\" Indicates a positive item  \"[]\" Indicates a negative item  -- DELETE ALL ITEMS NOT EXAMINED]    Constitutional: [x] Appears well-developed and well-nourished [x] No apparent distress      [] Abnormal -     Mental status: [x] Alert and awake  [x] Oriented to person/place/time [x] Able to follow commands    [] Abnormal -     Eyes:   EOM    [x]  Normal    [] Abnormal -   Sclera  [x]  Normal    [] Abnormal -          Discharge [x]  None visible   [] Abnormal -     HENT: [x] Normocephalic, atraumatic  [] Abnormal -   [x] Mouth/Throat: Mucous membranes are moist    External Ears [x] Normal  [] Abnormal -    Neck: [x] No visualized mass [] Abnormal -     Pulmonary/Chest: [x] Respiratory effort normal   [x] No visualized signs of difficulty breathing or respiratory distress        [] Abnormal -      Musculoskeletal:   [] Normal gait with no signs of ataxia         [x] Normal range of motion of neck        [] Abnormal -     Neurological:        [x] No Facial Asymmetry (Cranial nerve 7 motor function) (limited exam due to video visit)          [x] No gaze palsy        [] Abnormal -          Skin:        [] No significant exanthematous lesions or discoloration noted on facial skin         [x] Abnormal - Erythematous lines in the forearm & upper arm,with tender swelling in the axilla            Psychiatric:       [x] Normal Affect [] Abnormal -        [] No Hallucinations    Other pertinent observable physical exam findings:-                April Marie Knott, was evaluated through a synchronous (real-time) audio-video encounter. The patient (or guardian if applicable) is aware that this is a billable service. Verbal consent to proceed has been obtained within the past 12 months. The visit was conducted pursuant to the emergency declaration under the [address] [place] [address] authority and the [employer] Act. Patient identification was verified, and a caregiver was present when appropriate. The patient was located in a state where the provider was credentialed to provide care. An electronic signature was used to authenticate this note.     --Adrienne Wade MD

## 2021-07-20 ENCOUNTER — OFFICE VISIT (OUTPATIENT)
Dept: PRIMARY CARE CLINIC | Age: 41
End: 2021-07-20
Payer: COMMERCIAL

## 2021-07-20 VITALS
WEIGHT: 198 LBS | HEART RATE: 96 BPM | BODY MASS INDEX: 33.8 KG/M2 | RESPIRATION RATE: 15 BRPM | TEMPERATURE: 97.3 F | HEIGHT: 64 IN | SYSTOLIC BLOOD PRESSURE: 130 MMHG | OXYGEN SATURATION: 97 % | DIASTOLIC BLOOD PRESSURE: 80 MMHG

## 2021-07-20 DIAGNOSIS — I89.1: ICD-10-CM

## 2021-07-20 DIAGNOSIS — E78.5 HYPERLIPIDEMIA, UNSPECIFIED HYPERLIPIDEMIA TYPE: ICD-10-CM

## 2021-07-20 DIAGNOSIS — M79.10 MYALGIA: ICD-10-CM

## 2021-07-20 DIAGNOSIS — R53.83 FATIGUE, UNSPECIFIED TYPE: Primary | ICD-10-CM

## 2021-07-20 DIAGNOSIS — R06.00 DYSPNEA, UNSPECIFIED TYPE: ICD-10-CM

## 2021-07-20 DIAGNOSIS — F41.1 ANXIETY STATE: ICD-10-CM

## 2021-07-20 DIAGNOSIS — I10 ESSENTIAL HYPERTENSION: ICD-10-CM

## 2021-07-20 DIAGNOSIS — L03.124 ACUTE LYMPHANGITIS OF LEFT UPPER EXTREMITY: ICD-10-CM

## 2021-07-20 DIAGNOSIS — K21.9 GASTROESOPHAGEAL REFLUX DISEASE, UNSPECIFIED WHETHER ESOPHAGITIS PRESENT: ICD-10-CM

## 2021-07-20 DIAGNOSIS — I63.9 CEREBRAL INFARCTION, UNSPECIFIED MECHANISM (HCC): ICD-10-CM

## 2021-07-20 DIAGNOSIS — D64.9 ANEMIA, UNSPECIFIED TYPE: ICD-10-CM

## 2021-07-20 PROCEDURE — 99214 OFFICE O/P EST MOD 30 MIN: CPT | Performed by: INTERNAL MEDICINE

## 2021-07-20 RX ORDER — OMEPRAZOLE 20 MG/1
CAPSULE, DELAYED RELEASE ORAL
Qty: 30 CAPSULE | Refills: 3 | Status: CANCELLED | OUTPATIENT
Start: 2021-07-20

## 2021-07-20 RX ORDER — NICOTINE POLACRILEX 4 MG/1
20 GUM, CHEWING ORAL 2 TIMES DAILY
Qty: 180 TABLET | Refills: 3 | Status: SHIPPED
Start: 2021-07-20 | End: 2022-10-05

## 2021-07-20 RX ORDER — CEPHALEXIN 500 MG/1
500 CAPSULE ORAL 4 TIMES DAILY
Qty: 28 CAPSULE | Refills: 0 | Status: SHIPPED
Start: 2021-07-20 | End: 2021-08-02 | Stop reason: ALTCHOICE

## 2021-07-20 NOTE — PROGRESS NOTES
HPI:  Patient comes in today for follow-up on inflammation in the left arm patient states that she is improved, but she is complaining of feeling extremely fatigued also feels short of breath with effort. Patient continues to have acid reflux and seem to feel worse in complaining of being tired and having muscle aches. Blood pressure has been labile and patient is adjusting her blood pressure medication depending on her reading every day. Review of Systems   Constitutional: Positive for fatigue. Negative for chills and fever. HENT: Negative for congestion, ear pain, postnasal drip and sinus pressure. Eyes: Negative. Respiratory: Positive for shortness of breath (With effort as per HPI). Negative for cough, chest tightness and wheezing. Cardiovascular: Negative for chest pain and leg swelling. Gastrointestinal: Positive for abdominal pain (Discomfort in the upper abdomen and worsening acid reflux.) and nausea. Negative for blood in stool and vomiting. Genitourinary: Negative. Skin: Negative. Neurological: Negative for dizziness, tremors, speech difficulty and weakness. Hematological: Positive for adenopathy (Lymph node pain in left axilla is better but did not resolve 100%). Psychiatric/Behavioral: The patient is nervous/anxious.          Past Medical History:   Diagnosis Date    Cervicalgia     Cryptogenic stroke ([facility] [address] 11/7/2019    Diverticular disease     Gastritis     Hyperlipidemia     Hypertension     Nontoxic multinodular goiter     Prediabetes     Unspecified cerebral artery occlusion with cerebral infarction     Vertigo     Vitamin B12 deficiency      Past Surgical History:   Procedure Laterality Date    ABDOMINOPLASTY  2011    SIGMOIDOSCOPY      UPPER GASTROINTESTINAL ENDOSCOPY       Family History   Problem Relation Age of Onset    High Cholesterol Mother     Parkinsonism Mother     High Blood Pressure Mother     High Blood Pressure Father     Diabetes Father Social History     Tobacco Use    Smoking status: Never Smoker    Smokeless tobacco: Never Used   Vaping Use    Vaping Use: Never used   Substance Use Topics    Alcohol use: No    Drug use: No        Current Outpatient Medications on File Prior to Visit   Medication Sig Dispense Refill    lisinopril (PRINIVIL;ZESTRIL) 2.5 MG tablet take 1 tablet by mouth once daily      acyclovir (ZOVIRAX) 200 MG capsule take 1 capsule by mouth four times a day if needed      CRESTOR 20 MG tablet Take 1 tablet by mouth daily 90 tablet 3    diclofenac sodium (VOLTAREN) 1 % GEL Apply 2 g topically 2 times daily 350 g 2    sucralfate (CARAFATE) 1 GM tablet Take 1 tablet by mouth 4 times daily 120 tablet 5    FEROSUL 325 (65 Fe) MG tablet take 1 tablet by mouth every other day      Cyanocobalamin (VITAMIN B 12 PO) Take by mouth      acetaminophen (TYLENOL) 500 MG tablet Take 500 mg by mouth every 6 hours as needed for Pain (tid daily currently x 1 month)      Cholecalciferol (VITAMIN D3) 5000 UNITS TABS Take 1 tablet by mouth daily      aspirin 81 MG tablet Take 81 mg by mouth nightly        No current facility-administered medications on file prior to visit. PE:  VS:  /80   Pulse 96   Temp 97.3 °F (36.3 °C) (Temporal)   Resp 15   Ht 5' 4\" (1.626 m)   Wt 198 lb (89.8 kg)   LMP 07/20/2021   SpO2 97%   BMI 33.99 kg/m²   General:  Alert and oriented, NAD  HEENT:  Ear auricles are normal, PE, EOMI, Conjunctivae clear  NECK:  Supple without adenopathy or thyromegaly, no carotid bruits. LUNGS:  CTA all fields, no wheezes no rales and symmetrical expansion bilaterally. HEART:  RRR without M, R, or G, no precordial heave. ABDOMEN:  Soft, discomfort in the epigastric area with palpation, without palpable masses, No renal or aortic bruits. EXTREMITIES:  Without clubbing, cyanosis, or edema, 2+ DP/PT pulses B  Lymphangitis and redness in the left arm resolved completely.   Minimal discomfort with palpation of the left axilla with no palpable adenopathy. Lab Results   Component Value Date    WBC 8.9 08/02/2021    HGB 13.6 08/02/2021    HCT 42.7 08/02/2021     08/02/2021    CHOL 194 04/27/2021    TRIG 276 04/27/2021    HDL 43 04/27/2021    ALT 20 04/27/2021    AST 19 04/27/2021     04/27/2021    K 4.3 04/27/2021     04/27/2021    CREATININE 0.57 04/27/2021    BUN 16 04/27/2021    CO2 21 04/27/2021    TSH 1.590 04/27/2021    INR 1.0 11/07/2019    LABA1C 5.8 04/27/2021    LABMICR <12.0 04/27/2021        Impression/Plan:    1. Fatigue, unspecified type  Assessment & Plan: Will check blood work evaluate for B12 deficiency vitamin D deficiency etc., patient also is mildly anemic, GI evaluation is underway. Need to consider sleep study after above. Orders:  -     Comprehensive Metabolic Panel; Future  -     Hemoglobin A1C; Future  -     Lipid Panel; Future  -     Microalbumin / Creatinine Urine Ratio; Future  -     TSH without Reflex; Future  -     VITAMIN B12 & FOLATE; Future  -     Ferritin; Future  -     Vitamin D 25 Hydroxy; Future  2. Lymphangitis of axilla  3. Acute lymphangitis of left upper extremity  4. Gastroesophageal reflux disease, unspecified whether esophagitis present  Assessment & Plan:  Patient to continue PPI and sucralfate, follow-up with [facility]. Orders:  -     VITAMIN B12 & FOLATE; Future  -     Ferritin; Future  5. Dyspnea, unspecified type  6. Myalgia  -     Comprehensive Metabolic Panel; Future  -     Hemoglobin A1C; Future  -     Lipid Panel; Future  -     Microalbumin / Creatinine Urine Ratio; Future  -     TSH without Reflex; Future  -     VITAMIN B12 & FOLATE; Future  -     Ferritin; Future  7. Anemia, unspecified type  -     Comprehensive Metabolic Panel; Future  -     Hemoglobin A1C; Future  -     Lipid Panel; Future  -     Microalbumin / Creatinine Urine Ratio; Future  -     TSH without Reflex; Future  -     VITAMIN B12 & FOLATE;  Future  -     Ferritin; Future  8. Hyperlipidemia, unspecified hyperlipidemia type  -     Comprehensive Metabolic Panel; Future  -     Hemoglobin A1C; Future  -     Lipid Panel; Future  -     Microalbumin / Creatinine Urine Ratio; Future  -     TSH without Reflex; Future  -     VITAMIN B12 & FOLATE; Future  -     Ferritin; Future  9. Essential hypertension  Assessment & Plan:  Labile hypertension. Orders:  -     Comprehensive Metabolic Panel; Future  -     Hemoglobin A1C; Future  -     Lipid Panel; Future  -     Microalbumin / Creatinine Urine Ratio; Future  -     TSH without Reflex; Future  -     VITAMIN B12 & FOLATE; Future  -     Ferritin; Future  10. Cerebral infarction, unspecified mechanism ([facility] [address]  Assessment & Plan:  Remote cerebral infarct as documented by imaging   11. Anxiety state  Assessment & Plan:  The patient has a baseline mild chronic anxiety, this is likely stemming from her history of stroke at a young age.

## 2021-07-21 ENCOUNTER — TELEPHONE (OUTPATIENT)
Dept: PRIMARY CARE CLINIC | Age: 41
End: 2021-07-21

## 2021-07-21 DIAGNOSIS — R06.00 DYSPNEA, UNSPECIFIED TYPE: Primary | ICD-10-CM

## 2021-07-21 NOTE — TELEPHONE ENCOUNTER
Pt would like to inform Dr Alexander Leyva that she had a positive D-dimer test a month ago at [facility]. Pt is wondering if she can have a referral to a hematologist preferably a physician in [facility]. Pt also stated she is still having underarm pain and would like to know if there's anything else that can be done or should she wait until the antibiotics have kicked in.

## 2021-08-02 ENCOUNTER — OFFICE VISIT (OUTPATIENT)
Dept: PRIMARY CARE CLINIC | Age: 41
End: 2021-08-02
Payer: COMMERCIAL

## 2021-08-02 VITALS
HEIGHT: 64 IN | BODY MASS INDEX: 34.08 KG/M2 | RESPIRATION RATE: 18 BRPM | SYSTOLIC BLOOD PRESSURE: 130 MMHG | DIASTOLIC BLOOD PRESSURE: 82 MMHG | WEIGHT: 199.6 LBS | TEMPERATURE: 97.1 F | HEART RATE: 101 BPM | OXYGEN SATURATION: 98 %

## 2021-08-02 DIAGNOSIS — I95.9 HYPOTENSION, UNSPECIFIED HYPOTENSION TYPE: ICD-10-CM

## 2021-08-02 DIAGNOSIS — R82.71 BACTERIURIA: ICD-10-CM

## 2021-08-02 DIAGNOSIS — R42 DIZZINESSES: ICD-10-CM

## 2021-08-02 DIAGNOSIS — R53.83 FATIGUE, UNSPECIFIED TYPE: ICD-10-CM

## 2021-08-02 DIAGNOSIS — R35.0 FREQUENCY OF MICTURITION: ICD-10-CM

## 2021-08-02 DIAGNOSIS — R68.83 CHILLS (WITHOUT FEVER): ICD-10-CM

## 2021-08-02 DIAGNOSIS — R35.0 FREQUENCY OF MICTURITION: Primary | ICD-10-CM

## 2021-08-02 LAB
BASOPHILS ABSOLUTE: 0.05 E9/L (ref 0–0.2)
BASOPHILS RELATIVE PERCENT: 0.6 % (ref 0–2)
BILIRUBIN, POC: NORMAL
BLOOD URINE, POC: NORMAL
CLARITY, POC: CLEAR
COLOR, POC: YELLOW
EOSINOPHILS ABSOLUTE: 0.16 E9/L (ref 0.05–0.5)
EOSINOPHILS RELATIVE PERCENT: 1.8 % (ref 0–6)
GLUCOSE URINE, POC: NORMAL
HCT VFR BLD CALC: 42.7 % (ref 34–48)
HEMOGLOBIN: 13.6 G/DL (ref 11.5–15.5)
IMMATURE GRANULOCYTES #: 0.04 E9/L
IMMATURE GRANULOCYTES %: 0.5 % (ref 0–5)
KETONES, POC: NORMAL
LEUKOCYTE EST, POC: NORMAL
LYMPHOCYTES ABSOLUTE: 1.92 E9/L (ref 1.5–4)
LYMPHOCYTES RELATIVE PERCENT: 21.7 % (ref 20–42)
MCH RBC QN AUTO: 29.9 PG (ref 26–35)
MCHC RBC AUTO-ENTMCNC: 31.9 % (ref 32–34.5)
MCV RBC AUTO: 93.8 FL (ref 80–99.9)
MONOCYTES ABSOLUTE: 0.67 E9/L (ref 0.1–0.95)
MONOCYTES RELATIVE PERCENT: 7.6 % (ref 2–12)
NEUTROPHILS ABSOLUTE: 6.01 E9/L (ref 1.8–7.3)
NEUTROPHILS RELATIVE PERCENT: 67.8 % (ref 43–80)
NITRITE, POC: NORMAL
PDW BLD-RTO: 12.8 FL (ref 11.5–15)
PH, POC: 6
PLATELET # BLD: 341 E9/L (ref 130–450)
PMV BLD AUTO: 8.6 FL (ref 7–12)
PROTEIN, POC: NORMAL
RBC # BLD: 4.55 E12/L (ref 3.5–5.5)
SPECIFIC GRAVITY, POC: 1.03
UROBILINOGEN, POC: 0.2
WBC # BLD: 8.9 E9/L (ref 4.5–11.5)

## 2021-08-02 PROCEDURE — 99214 OFFICE O/P EST MOD 30 MIN: CPT | Performed by: INTERNAL MEDICINE

## 2021-08-02 PROCEDURE — 81002 URINALYSIS NONAUTO W/O SCOPE: CPT | Performed by: INTERNAL MEDICINE

## 2021-08-02 RX ORDER — OMEPRAZOLE 20 MG/1
CAPSULE, DELAYED RELEASE ORAL
COMMUNITY
Start: 2021-07-21 | End: 2021-08-02 | Stop reason: ALTCHOICE

## 2021-08-02 RX ORDER — SACCHAROMYCES BOULARDII 250 MG
250 CAPSULE ORAL 2 TIMES DAILY
COMMUNITY
Start: 2021-07-23 | End: 2022-03-01 | Stop reason: ALTCHOICE

## 2021-08-02 RX ORDER — RIFAXIMIN 550 MG/1
TABLET ORAL
COMMUNITY
Start: 2021-07-27 | End: 2021-09-14 | Stop reason: ALTCHOICE

## 2021-08-02 NOTE — ASSESSMENT & PLAN NOTE
Urinalysis and urine culture ordered. Patient just finished Keflex for treatment of lymphangitis. Patient is seeing a urologist at [facility] and states that her urologist stopped practicing she is referred to see another in about 3 weeks.

## 2021-08-02 NOTE — PROGRESS NOTES
HPI:  Patient comes in today for complaint of feeling dizzy associated with hypotension states her blood pressure drops to 109 and the bottom number is in the 60s and she feels lightheaded and tired. Patient noticed that to happen mainly at night. She has been taking the lisinopril every morning unless pressure is low at times she skips the dose. Patient states she continues to feel cold and having chills, she had a urinary tract infection and has seen urologist at the [facility] clinic, last urine in July 30 showed +1 leukocyte and no blood in the urine, the one before that July 21 had 3+ leukocytes and had 3+ blood.   Review of Systems     Past Medical History:   Diagnosis Date    Cervicalgia     Cryptogenic stroke ([facility] [address] 11/7/2019    Diverticular disease     Gastritis     Hyperlipidemia     Hypertension     Nontoxic multinodular goiter     Prediabetes     Unspecified cerebral artery occlusion with cerebral infarction     Vertigo     Vitamin B12 deficiency      Past Surgical History:   Procedure Laterality Date    ABDOMINOPLASTY  2011    SIGMOIDOSCOPY      UPPER GASTROINTESTINAL ENDOSCOPY       Family History   Problem Relation Age of Onset    High Cholesterol Mother     Parkinsonism Mother     High Blood Pressure Mother     High Blood Pressure Father     Diabetes Father       Social History     Tobacco Use    Smoking status: Never Smoker    Smokeless tobacco: Never Used   Vaping Use    Vaping Use: Never used   Substance Use Topics    Alcohol use: No    Drug use: No        Current Outpatient Medications on File Prior to Visit   Medication Sig Dispense Refill    XIFAXAN 550 MG tablet take 1 tablet by mouth three times a day for 14 days      saccharomyces boulardii (FLORASTOR) 250 MG capsule Take 250 mg by mouth 2 times daily      omeprazole 20 MG EC tablet Take 1 tablet by mouth 2 times daily 180 tablet 3    lisinopril (PRINIVIL;ZESTRIL) 2.5 MG tablet take 1 tablet by mouth once daily  acyclovir (ZOVIRAX) 200 MG capsule take 1 capsule by mouth four times a day if needed      CRESTOR 20 MG tablet Take 1 tablet by mouth daily 90 tablet 3    diclofenac sodium (VOLTAREN) 1 % GEL Apply 2 g topically 2 times daily 350 g 2    sucralfate (CARAFATE) 1 GM tablet Take 1 tablet by mouth 4 times daily 120 tablet 5    FEROSUL 325 (65 Fe) MG tablet take 1 tablet by mouth every other day      Cyanocobalamin (VITAMIN B 12 PO) Take by mouth      acetaminophen (TYLENOL) 500 MG tablet Take 500 mg by mouth every 6 hours as needed for Pain (tid daily currently x 1 month)      Cholecalciferol (VITAMIN D3) 5000 UNITS TABS Take 1 tablet by mouth daily      aspirin 81 MG tablet Take 81 mg by mouth nightly        No current facility-administered medications on file prior to visit. PE:  VS:  /82   Pulse 101   Temp 97.1 °F (36.2 °C) (Temporal)   Resp 18   Ht 5' 4\" (1.626 m)   Wt 199 lb 9.6 oz (90.5 kg)   LMP 07/20/2021   SpO2 98%   BMI 34.26 kg/m²   General:  Alert and oriented, NAD  HEENT:  TMs, EACs and auricles are normal, KALEN, EOMI, Conjunctivae clear       Throat currently clear. NECK:  Supple without adenopathy or thyromegaly, no carotid bruits. LUNGS:  CTA all fields  HEART:  RRR without M, R, or G  ABDOMEN: Abdomen is soft there is suprapubic tenderness with palpation. No palpable masses.     EXTREMITIES:  Without clubbing, cyanosis, or edema, 2+ DP/PT pulses B  Skin,no rash,lymphangitis resolved  Axilla;no adenopathy  Lab Results   Component Value Date    WBC 6.89 04/27/2021    HGB 13.5 04/27/2021    HCT 41.6 04/27/2021     04/27/2021    CHOL 194 04/27/2021    TRIG 276 04/27/2021    HDL 43 04/27/2021    ALT 20 04/27/2021    AST 19 04/27/2021     04/27/2021    K 4.3 04/27/2021     04/27/2021    CREATININE 0.57 04/27/2021    BUN 16 04/27/2021    CO2 21 04/27/2021    TSH 1.590 04/27/2021    INR 1.0 11/07/2019    LABA1C 5.8 04/27/2021    LABMICR <12.0 04/27/2021 Impression/Plan:    1. Frequency of micturition  Assessment & Plan:  Urinalysis and urine culture ordered. Patient just finished Keflex for treatment of lymphangitis. Patient is seeing a urologist at [facility] and states that her urologist stopped practicing she is referred to see another in about 3 weeks. Orders:  -     POCT Urinalysis no Micro  -     Culture, Urine; Future  -     CBC WITH AUTO DIFFERENTIAL; Future  2. Chills (without fever)  Assessment & Plan:  Patient feeling cold and having chills repeat CBC and check if any infection. Orders:  -     POCT Urinalysis no Micro  -     Culture, Urine; Future  -     CBC WITH AUTO DIFFERENTIAL; Future  3. Bacteriuria  -     POCT Urinalysis no Micro  -     Culture, Urine; Future  4. Fatigue, unspecified type  5. Dizzinesses  6. Hypotension, unspecified hypotension type  Assessment & Plan:      Patient counseled to check her blood pressure before taking the medication and to change the time of the blood pressure tonight since she has noticed hypotension at night, patient will avoid taking the medication if her blood pressure is less than 733 systolic and if less than 88 diastolic  The patient will bring her blood pressure readings to her office visit in 2 weeks. Patient will continue follow-up with cardiology. Fatigue and dizziness associated with hypotension.

## 2021-08-03 ENCOUNTER — TELEPHONE (OUTPATIENT)
Dept: PRIMARY CARE CLINIC | Age: 41
End: 2021-08-03

## 2021-08-03 NOTE — TELEPHONE ENCOUNTER
Pt stated she is, \"feeling dizzy, head feels funny and HR is bit faster. \" She would like to know what to do

## 2021-08-03 NOTE — TELEPHONE ENCOUNTER
Please notify the patient to take saltine crackers and drinking Gatorade, also stop taking the lisinopril. Patient to check her blood pressure and her pulse rate and update us tomorrow if she continues to have symptoms and have fresh blood pressure and pulse readings when she calls. Patient may go to the emergency room if she is not better, or may be able to see her tomorrow if not better.

## 2021-08-04 NOTE — TELEPHONE ENCOUNTER
Lab all is good  Need to see eye doctor  Flashes of light could also be migraine aura  We can offer her to come to the office if she wants

## 2021-08-04 NOTE — TELEPHONE ENCOUNTER
Patient notified. Please result most recent blood work.      She also wanted Dr. Fuller Number to know she has dizziness and and flashes of light in her eyes

## 2021-08-10 ENCOUNTER — TELEPHONE (OUTPATIENT)
Dept: PRIMARY CARE CLINIC | Age: 41
End: 2021-08-10

## 2021-08-10 DIAGNOSIS — R00.1 BRADYCARDIA, UNSPECIFIED: Primary | ICD-10-CM

## 2021-08-10 NOTE — TELEPHONE ENCOUNTER
Patient is requesting a blood work order for magnesium be faxed to [facility].  Fax # [phone]

## 2021-08-11 LAB — MICROALBUMIN UR-MCNC: 16

## 2021-08-13 LAB
ALBUMIN SERPL-MCNC: 4.6 G/DL
ALP BLD-CCNC: 72 U/L
ALT SERPL-CCNC: 19 U/L
ANION GAP SERPL CALCULATED.3IONS-SCNC: 12 MMOL/L
AST SERPL-CCNC: 21 U/L
AVERAGE GLUCOSE: NORMAL
BASOPHILS ABSOLUTE: 0.07 /ΜL
BASOPHILS RELATIVE PERCENT: 0.9 %
BILIRUB SERPL-MCNC: 0.3 MG/DL (ref 0.1–1.4)
BUN BLDV-MCNC: 12 MG/DL
CALCIUM SERPL-MCNC: 9.6 MG/DL
CHLORIDE BLD-SCNC: 102 MMOL/L
CHOLESTEROL, TOTAL: 168 MG/DL
CHOLESTEROL/HDL RATIO: 3.5
CO2: 23 MMOL/L
CREAT SERPL-MCNC: 0.63 MG/DL
EOSINOPHILS ABSOLUTE: 0.12 /ΜL
EOSINOPHILS RELATIVE PERCENT: 1.6 %
FERRITIN: 147 NG/ML (ref 9–150)
GFR CALCULATED: >60
GLUCOSE BLD-MCNC: 110 MG/DL
HBA1C MFR BLD: 5.5 %
HCT VFR BLD CALC: 42.8 % (ref 36–46)
HDLC SERPL-MCNC: 48 MG/DL (ref 35–70)
HEMOGLOBIN: 14.1 G/DL (ref 12–16)
LDL CHOLESTEROL CALCULATED: 77 MG/DL (ref 0–160)
LYMPHOCYTES ABSOLUTE: 2.05 /ΜL
LYMPHOCYTES RELATIVE PERCENT: 27 %
MAGNESIUM: 2 MG/DL
MCH RBC QN AUTO: 29.7 PG
MCHC RBC AUTO-ENTMCNC: 32.9 G/DL
MCV RBC AUTO: 90.3 FL
MONOCYTES ABSOLUTE: 0.58 /ΜL
MONOCYTES RELATIVE PERCENT: 7.7 %
NEUTROPHILS ABSOLUTE: 4.68 /ΜL
NEUTROPHILS RELATIVE PERCENT: 62.5 %
NONHDLC SERPL-MCNC: 120 MG/DL
PLATELET # BLD: 361 K/ΜL
PMV BLD AUTO: 9.2 FL
POTASSIUM SERPL-SCNC: 4.3 MMOL/L
RBC # BLD: 4.74 10^6/ΜL
SODIUM BLD-SCNC: 137 MMOL/L
TOTAL PROTEIN: 7.6
TRIGL SERPL-MCNC: 214 MG/DL
VITAMIN B-12: 1429
VLDLC SERPL CALC-MCNC: 43 MG/DL
WBC # BLD: 7.5 10^3/ML

## 2021-08-14 PROBLEM — I63.9 CEREBRAL INFARCTION (HCC): Status: ACTIVE | Noted: 2021-08-14

## 2021-08-14 PROBLEM — R00.1 BRADYCARDIA, UNSPECIFIED: Status: RESOLVED | Noted: 2019-10-25 | Resolved: 2021-08-14

## 2021-08-14 PROBLEM — M50.10 CERVICAL DISC DISORDER WITH RADICULOPATHY: Status: ACTIVE | Noted: 2017-07-27

## 2021-08-14 PROBLEM — K29.50 CHRONIC GASTRITIS WITHOUT BLEEDING: Status: ACTIVE | Noted: 2021-07-23

## 2021-08-14 ASSESSMENT — ENCOUNTER SYMPTOMS
COUGH: 0
EYES NEGATIVE: 1
SINUS PRESSURE: 0
ABDOMINAL PAIN: 1
WHEEZING: 0
BLOOD IN STOOL: 0
CHEST TIGHTNESS: 0
VOMITING: 0
SHORTNESS OF BREATH: 1
NAUSEA: 1

## 2021-08-14 NOTE — ASSESSMENT & PLAN NOTE
The patient has a baseline mild chronic anxiety, this is likely stemming from her history of stroke at a young age.

## 2021-08-14 NOTE — ASSESSMENT & PLAN NOTE
Will check blood work evaluate for B12 deficiency vitamin D deficiency etc., patient also is mildly anemic, GI evaluation is underway. Need to consider sleep study after above.

## 2021-08-16 ENCOUNTER — OFFICE VISIT (OUTPATIENT)
Dept: PRIMARY CARE CLINIC | Age: 41
End: 2021-08-16
Payer: COMMERCIAL

## 2021-08-16 VITALS
TEMPERATURE: 97.3 F | HEIGHT: 64 IN | HEART RATE: 103 BPM | RESPIRATION RATE: 18 BRPM | DIASTOLIC BLOOD PRESSURE: 82 MMHG | OXYGEN SATURATION: 99 % | SYSTOLIC BLOOD PRESSURE: 124 MMHG | BODY MASS INDEX: 33.63 KG/M2 | WEIGHT: 197 LBS

## 2021-08-16 DIAGNOSIS — E66.09 CLASS 1 OBESITY DUE TO EXCESS CALORIES WITH SERIOUS COMORBIDITY AND BODY MASS INDEX (BMI) OF 33.0 TO 33.9 IN ADULT: ICD-10-CM

## 2021-08-16 DIAGNOSIS — S40.029D: ICD-10-CM

## 2021-08-16 DIAGNOSIS — I10 ESSENTIAL HYPERTENSION: ICD-10-CM

## 2021-08-16 DIAGNOSIS — M25.562 PAIN AND SWELLING OF LEFT KNEE: ICD-10-CM

## 2021-08-16 DIAGNOSIS — E78.5 HYPERLIPIDEMIA, UNSPECIFIED HYPERLIPIDEMIA TYPE: Primary | ICD-10-CM

## 2021-08-16 DIAGNOSIS — M79.602 PAIN AND SWELLING OF LEFT UPPER EXTREMITY: ICD-10-CM

## 2021-08-16 DIAGNOSIS — M79.89 PAIN AND SWELLING OF LEFT UPPER EXTREMITY: ICD-10-CM

## 2021-08-16 DIAGNOSIS — M25.462 PAIN AND SWELLING OF LEFT KNEE: ICD-10-CM

## 2021-08-16 DIAGNOSIS — R10.13 EPIGASTRIC PAIN: ICD-10-CM

## 2021-08-16 DIAGNOSIS — N39.0 FREQUENT UTI: ICD-10-CM

## 2021-08-16 DIAGNOSIS — R73.03 PREDIABETES: ICD-10-CM

## 2021-08-16 PROBLEM — S40.029A CONTUSION OF UPPER LIMB: Status: ACTIVE | Noted: 2021-08-16

## 2021-08-16 PROCEDURE — 99214 OFFICE O/P EST MOD 30 MIN: CPT | Performed by: INTERNAL MEDICINE

## 2021-08-16 ASSESSMENT — ENCOUNTER SYMPTOMS
DIARRHEA: 0
SHORTNESS OF BREATH: 0
SORE THROAT: 0
CONSTIPATION: 0
WHEEZING: 0
ABDOMINAL PAIN: 1
VOICE CHANGE: 0
SINUS PAIN: 0
FACIAL SWELLING: 0
CHOKING: 0
EYE DISCHARGE: 0
ANAL BLEEDING: 0
EYE PAIN: 0
CHEST TIGHTNESS: 0

## 2021-08-16 NOTE — PROGRESS NOTES
HPI:  Patient comes in today for multiple complaints patient states that her abdominal pain persists and GI specialist is requesting vitamin K check. Patient states that her diarrhea has improved with Xifaxan she has 2 refills to take if the diarrhea returns. Also pain in the left arm & Elbow since the episode of lymphangitis  Pain left knee & swelling with walking  Frequency of urine irritation& infections  Review of Systems   Constitutional: Negative for activity change, appetite change, chills, diaphoresis and fever. HENT: Negative for congestion, ear pain, facial swelling, sinus pain, sore throat and voice change. Eyes: Negative for pain, discharge and visual disturbance. Respiratory: Negative for choking, chest tightness, shortness of breath and wheezing. Cardiovascular: Negative for chest pain and palpitations. Gastrointestinal: Positive for abdominal pain. Negative for anal bleeding, constipation and diarrhea. Genitourinary: Positive for frequency. Negative for difficulty urinating, dysuria, flank pain and hematuria. Musculoskeletal: Positive for gait problem. Negative for arthralgias and neck stiffness. Skin: Negative for rash and wound. Neurological: Negative for tremors, syncope, facial asymmetry, speech difficulty, weakness and light-headedness. Hematological: Negative for adenopathy. Psychiatric/Behavioral: Negative.          Past Medical History:   Diagnosis Date    Cervicalgia     Cryptogenic stroke ([facility] [address] 11/7/2019    Diverticular disease     Gastritis     Hyperlipidemia     Hypertension     Nontoxic multinodular goiter     Prediabetes     Unspecified cerebral artery occlusion with cerebral infarction     Vertigo     Vitamin B12 deficiency      Past Surgical History:   Procedure Laterality Date    ABDOMINOPLASTY  2011    SIGMOIDOSCOPY      UPPER GASTROINTESTINAL ENDOSCOPY       Family History   Problem Relation Age of Onset    High Cholesterol Mother    [facility] Parkinsonism Mother     High Blood Pressure Mother     High Blood Pressure Father     Diabetes Father       Social History     Tobacco Use    Smoking status: Never Smoker    Smokeless tobacco: Never Used   Vaping Use    Vaping Use: Never used   Substance Use Topics    Alcohol use: No    Drug use: No        Current Outpatient Medications on File Prior to Visit   Medication Sig Dispense Refill    saccharomyces boulardii (FLORASTOR) 250 MG capsule Take 250 mg by mouth 2 times daily      omeprazole 20 MG EC tablet Take 1 tablet by mouth 2 times daily 180 tablet 3    lisinopril (PRINIVIL;ZESTRIL) 2.5 MG tablet take 1 tablet by mouth once daily      acyclovir (ZOVIRAX) 200 MG capsule take 1 capsule by mouth four times a day if needed      CRESTOR 20 MG tablet Take 1 tablet by mouth daily 90 tablet 3    sucralfate (CARAFATE) 1 GM tablet Take 1 tablet by mouth 4 times daily 120 tablet 5    FEROSUL 325 (65 Fe) MG tablet take 1 tablet by mouth every other day      Cyanocobalamin (VITAMIN B 12 PO) Take by mouth      acetaminophen (TYLENOL) 500 MG tablet Take 500 mg by mouth every 6 hours as needed for Pain (tid daily currently x 1 month)      aspirin 81 MG tablet Take 81 mg by mouth nightly       XIFAXAN 550 MG tablet take 1 tablet by mouth three times a day for 14 days (Patient not taking: Reported on 8/16/2021)       No current facility-administered medications on file prior to visit. PE:  VS:  /82   Pulse 103   Temp 97.3 °F (36.3 °C) (Temporal)   Resp 18   Ht 5' 4\" (1.626 m)   Wt 197 lb (89.4 kg)   LMP 07/20/2021   SpO2 99%   BMI 33.81 kg/m²   General:  Alert and oriented, NAD  HEENT: Ear auricles are normal, KALEN, EOMI, Conjunctivae clear       Throat currently clear. NECK:  Supple without adenopathy or thyromegaly, no carotid bruits.    LUNGS:  CTA all fields  HEART:  RRR without M, R, or G  ABDOMEN:  Soft and nontender without palpable abnormalities, No renal or aortic bruits. EXTREMITIES:  Without clubbing, cyanosis, or edema, 2+ DP/PT pulses B  Crepitus bilateral knees,Lt>Rt ,no effusion    Lab Results   Component Value Date    WBC 8.9 08/02/2021    HGB 13.6 08/02/2021    HCT 42.7 08/02/2021     08/02/2021    CHOL 194 04/27/2021    TRIG 276 04/27/2021    HDL 43 04/27/2021    ALT 20 04/27/2021    AST 19 04/27/2021     04/27/2021    K 4.3 04/27/2021     04/27/2021    CREATININE 0.57 04/27/2021    BUN 16 04/27/2021    CO2 21 04/27/2021    TSH 1.590 04/27/2021    INR 1.0 11/07/2019    LABA1C 5.8 04/27/2021    LABMICR <12.0 04/27/2021        Impression/Plan:    1. Hyperlipidemia, unspecified hyperlipidemia type  -     Comprehensive Metabolic Panel; Future  -     Hemoglobin A1C; Future  -     Lipid Panel; Future  -     CBC Auto Differential; Future  -     TSH without Reflex; Future  2. Frequent UTI  -     Urinalysis; Standing  3. Epigastric pain  -     VITAMIN K; Future  4. Contusion of upper extremity, unspecified laterality, subsequent encounter  -     VITAMIN K; Future  5. Pain and swelling of left knee  -     XR KNEE LEFT (3 VIEWS); Future  -     diclofenac sodium (VOLTAREN) 1 % GEL; Apply 2 g topically 2 times daily, Topical, 2 TIMES DAILY Starting Mon 8/16/2021, Disp-100 g, R-3, Normal  6. Pain and swelling of left upper extremity  -     diclofenac sodium (VOLTAREN) 1 % GEL; Apply 2 g topically 2 times daily, Topical, 2 TIMES DAILY Starting Mon 8/16/2021, Disp-100 g, R-3, Normal  -     US DUP UPPER EXTREMITY LEFT VENOUS; Future  7. Class 1 obesity due to excess calories with serious comorbidity and body mass index (BMI) of 33.0 to 33.9 in adult  -     Comprehensive Metabolic Panel; Future  -     Hemoglobin A1C; Future  -     Lipid Panel; Future  -     CBC Auto Differential; Future  -     TSH without Reflex; Future  8.  Prediabetes  Assessment & Plan:  Patient will continue weight loss, A1c in the normal range, her prediabetes picture includes elevated fasting glucose elevated triglycerides and VLDL. In addition to her obesity. Patient may be a candidate for medication if she is unable to lose the weight but currently encouraged to increase her activity decrease portions and avoid starches etc.   Orders:  -     Comprehensive Metabolic Panel; Future  -     Hemoglobin A1C; Future  -     Lipid Panel; Future  -     CBC Auto Differential; Future  -     TSH without Reflex; Future  9. Essential hypertension  -     Comprehensive Metabolic Panel; Future  -     Hemoglobin A1C; Future  -     Lipid Panel; Future  -     CBC Auto Differential; Future  -     TSH without Reflex; Future   Lab reviewed  Triglycerides,BS & VLDL not at goal but A1c is Nl,no need for metformin especially that the pt. Is loosing,if she continues wt. Loss & avoiding carbs & sugars,may correct prediabetes  Knee excercises for strengthening quads muscles advised  Patient was shown a demonstration of squat exercises to strengthen her quadriceps. She will come if her knee is swollen and will proceed with x-rays. See orders. Patient doing well with weight loss.   Encouraged to continue the same

## 2021-08-16 NOTE — ASSESSMENT & PLAN NOTE
Patient will continue weight loss, A1c in the normal range, her prediabetes picture includes elevated fasting glucose elevated triglycerides and VLDL. In addition to her obesity.   Patient may be a candidate for medication if she is unable to lose the weight but currently encouraged to increase her activity decrease portions and avoid starches etc.

## 2021-08-26 DIAGNOSIS — S40.022A CONTUSION OF LEFT UPPER EXTREMITY, INITIAL ENCOUNTER: ICD-10-CM

## 2021-08-26 DIAGNOSIS — M25.532 PAIN OF BOTH WRIST JOINTS: ICD-10-CM

## 2021-08-26 DIAGNOSIS — M25.531 PAIN OF BOTH WRIST JOINTS: ICD-10-CM

## 2021-08-26 DIAGNOSIS — M25.541 ARTHRALGIA OF BOTH HANDS: ICD-10-CM

## 2021-08-26 DIAGNOSIS — M25.542 ARTHRALGIA OF BOTH HANDS: ICD-10-CM

## 2021-08-26 DIAGNOSIS — I10 ESSENTIAL HYPERTENSION: ICD-10-CM

## 2021-08-26 DIAGNOSIS — M79.10 MYALGIA: ICD-10-CM

## 2021-09-02 ENCOUNTER — OFFICE VISIT (OUTPATIENT)
Dept: PRIMARY CARE CLINIC | Age: 41
End: 2021-09-02
Payer: COMMERCIAL

## 2021-09-02 VITALS
HEIGHT: 64 IN | RESPIRATION RATE: 20 BRPM | BODY MASS INDEX: 33.53 KG/M2 | DIASTOLIC BLOOD PRESSURE: 80 MMHG | HEART RATE: 99 BPM | WEIGHT: 196.4 LBS | TEMPERATURE: 97.5 F | OXYGEN SATURATION: 100 % | SYSTOLIC BLOOD PRESSURE: 122 MMHG

## 2021-09-02 DIAGNOSIS — K29.50 CHRONIC GASTRITIS WITHOUT BLEEDING, UNSPECIFIED GASTRITIS TYPE: ICD-10-CM

## 2021-09-02 DIAGNOSIS — M75.22 BICIPITAL TENDINITIS OF LEFT SHOULDER: ICD-10-CM

## 2021-09-02 DIAGNOSIS — M79.602 LEFT ARM PAIN: Primary | ICD-10-CM

## 2021-09-02 DIAGNOSIS — M25.522 ELBOW PAIN, LEFT: ICD-10-CM

## 2021-09-02 DIAGNOSIS — M79.89 PAIN AND SWELLING OF LEFT UPPER EXTREMITY: ICD-10-CM

## 2021-09-02 DIAGNOSIS — M77.9 TENDINITIS: ICD-10-CM

## 2021-09-02 DIAGNOSIS — M79.602 PAIN AND SWELLING OF LEFT UPPER EXTREMITY: ICD-10-CM

## 2021-09-02 PROCEDURE — 99213 OFFICE O/P EST LOW 20 MIN: CPT | Performed by: INTERNAL MEDICINE

## 2021-09-02 RX ORDER — METHYLPREDNISOLONE 4 MG/1
TABLET ORAL
Qty: 21 TABLET | Refills: 0 | Status: SHIPPED | OUTPATIENT
Start: 2021-09-02 | End: 2021-09-08

## 2021-09-02 RX ORDER — PANTOPRAZOLE SODIUM 40 MG/1
40 TABLET, DELAYED RELEASE ORAL
Qty: 60 TABLET | Refills: 1 | Status: SHIPPED
Start: 2021-09-02 | End: 2021-09-14 | Stop reason: ALTCHOICE

## 2021-09-02 ASSESSMENT — ENCOUNTER SYMPTOMS
ABDOMINAL PAIN: 1
CHEST TIGHTNESS: 0
SHORTNESS OF BREATH: 0
COUGH: 0

## 2021-09-07 ENCOUNTER — TELEPHONE (OUTPATIENT)
Dept: PRIMARY CARE CLINIC | Age: 41
End: 2021-09-07

## 2021-09-07 NOTE — TELEPHONE ENCOUNTER
The patient is complaining of diarrhea and a temperature of 99.5, please give her an appointment tomorrow or Thursday. We will do the referral for orthopedic and cancel the order for the ultrasound pending orthopedic evaluation.

## 2021-09-07 NOTE — TELEPHONE ENCOUNTER
Patient called asking if   Dr Guevara Malhotra decided to do a MRI or US of patients left arm, Dr Guevara Malhotra discuss these options with patient at her last OV and was going to get back with patient end of last week.

## 2021-09-07 NOTE — TELEPHONE ENCOUNTER
[facility] never told patient to have a musculoskeletal US and they are not on the same system, therefore can not assist with what code to order. Please advise if patient should go forward with MRI?

## 2021-09-14 ENCOUNTER — OFFICE VISIT (OUTPATIENT)
Dept: PRIMARY CARE CLINIC | Age: 41
End: 2021-09-14
Payer: COMMERCIAL

## 2021-09-14 VITALS
OXYGEN SATURATION: 99 % | HEART RATE: 89 BPM | BODY MASS INDEX: 33.97 KG/M2 | DIASTOLIC BLOOD PRESSURE: 78 MMHG | SYSTOLIC BLOOD PRESSURE: 122 MMHG | HEIGHT: 64 IN | WEIGHT: 199 LBS | TEMPERATURE: 96.9 F

## 2021-09-14 DIAGNOSIS — A04.72 C. DIFFICILE COLITIS: Primary | ICD-10-CM

## 2021-09-14 DIAGNOSIS — K62.5 RECTAL BLEEDING: ICD-10-CM

## 2021-09-14 DIAGNOSIS — R42 DIZZINESS: ICD-10-CM

## 2021-09-14 DIAGNOSIS — A09 DIARRHEA OF INFECTIOUS ORIGIN: ICD-10-CM

## 2021-09-14 DIAGNOSIS — K29.00 ACUTE GASTRITIS, PRESENCE OF BLEEDING UNSPECIFIED, UNSPECIFIED GASTRITIS TYPE: ICD-10-CM

## 2021-09-14 PROCEDURE — 99214 OFFICE O/P EST MOD 30 MIN: CPT | Performed by: INTERNAL MEDICINE

## 2021-09-14 PROCEDURE — 1111F DSCHRG MED/CURRENT MED MERGE: CPT | Performed by: INTERNAL MEDICINE

## 2021-09-14 RX ORDER — VANCOMYCIN HYDROCHLORIDE 125 MG/1
CAPSULE ORAL
COMMUNITY
Start: 2021-09-10 | End: 2021-09-28 | Stop reason: ALTCHOICE

## 2021-09-14 ASSESSMENT — ENCOUNTER SYMPTOMS
ABDOMINAL PAIN: 1
COUGH: 0
SHORTNESS OF BREATH: 0
CHOKING: 0
DIARRHEA: 1
CHEST TIGHTNESS: 0
BLOOD IN STOOL: 1
EYES NEGATIVE: 1

## 2021-09-14 ASSESSMENT — PATIENT HEALTH QUESTIONNAIRE - PHQ9
SUM OF ALL RESPONSES TO PHQ QUESTIONS 1-9: 0
SUM OF ALL RESPONSES TO PHQ QUESTIONS 1-9: 0
1. LITTLE INTEREST OR PLEASURE IN DOING THINGS: 0
SUM OF ALL RESPONSES TO PHQ9 QUESTIONS 1 & 2: 0
2. FEELING DOWN, DEPRESSED OR HOPELESS: 0
SUM OF ALL RESPONSES TO PHQ QUESTIONS 1-9: 0

## 2021-09-14 NOTE — PROGRESS NOTES
Post-Discharge Transitional Care Management Services or Hospital Follow Up      April Mary Diaz   YOB: 1980    Date of Office Visit:  9/14/2021  Date of Hospital Admission: 1/24/20  Date of Hospital Discharge: 1/24/20  Readmission Risk Score(high >=14%.  Medium >=10%):No data recorded    Care management risk score Rising risk (score 2-5) and Complex Care (Scores >=6): 4     Non face to face  following discharge, date last encounter closed (first attempt may have been earlier): *No documented post hospital discharge outreach found in the last 14 days *No documented post hospital discharge outreach found in the last 14 days    Call initiated 2 business days of discharge: *No response recorded in the last 14 days     Patient Active Problem List   Diagnosis    Mixed hyperlipidemia    Migraine headache    History of exercise stress test    Cryptogenic stroke ([facility] [address]    Chest pain    Abnormal glucose level    Anxiety state    Disturbance in sleep behavior    Fatty liver    Hiatal hernia    Essential hypertension    Metabolic syndrome    Class 1 obesity due to excess calories with serious comorbidity and body mass index (BMI) of 33.0 to 33.9 in adult    Gastroesophageal reflux disease    Prediabetes    Heart palpitations    Myalgia    Frequency of micturition    Chills (without fever)    Bacteriuria    Fatigue    Dizziness    Hypotension    Cerebral infarction (HCC)    Cervical disc disorder with radiculopathy    Chronic gastritis without bleeding    Pain and swelling of left upper extremity    Pain and swelling of left knee    Contusion of upper limb    Epigastric pain    C. difficile colitis    Acute gastritis    Rectal bleeding    Diarrhea of infectious origin       Allergies   Allergen Reactions    Peanut-Containing Drug Products Shortness Of Breath and Swelling    Nitrofurantoin Hives    Sulfamethoxazole-Trimethoprim Hives    Fish-Derived Products     Atorvastatin vancomycin. Patient stayed in the hospital for 3 days then discharged on oral vancomycin    Inpatient course: Discharge summary reviewed- see chart. Interval history/Current status: The patient states that she is feeling tired and dizzy she also states that she has blood in her stools but stools are currently formed she is taking the oral vancomycin pills regularly. Review of Systems   Constitutional: Negative. Negative for chills and fever. HENT: Negative. Eyes: Negative. Respiratory: Negative for cough, choking, chest tightness and shortness of breath. Cardiovascular: Negative for chest pain. Gastrointestinal: Positive for abdominal pain, blood in stool and diarrhea. Genitourinary: Negative. Musculoskeletal: Negative. Neurological: Positive for dizziness and light-headedness. Negative for headaches. Hematological: Negative for adenopathy. Vitals:    09/14/21 0935   BP: 122/78   Site: Left Upper Arm   Position: Sitting   Cuff Size: Large Adult   Pulse: 89   Temp: 96.9 °F (36.1 °C)   SpO2: 99%   Weight: 199 lb (90.3 kg)   Height: 5' 4\" (1.626 m)     Body mass index is 34.16 kg/m². Wt Readings from Last 3 Encounters:   09/14/21 199 lb (90.3 kg)   09/02/21 196 lb 6.4 oz (89.1 kg)   08/16/21 197 lb (89.4 kg)     BP Readings from Last 3 Encounters:   09/14/21 122/78   09/02/21 122/80   08/16/21 124/82       Physical Exam  Constitutional:       Appearance: Normal appearance. HENT:      Head: Normocephalic. Right Ear: Tympanic membrane normal.      Left Ear: Tympanic membrane normal.      Nose: Nose normal.      Mouth/Throat:      Mouth: Mucous membranes are moist.   Eyes:      Extraocular Movements: Extraocular movements intact. Pupils: Pupils are equal, round, and reactive to light. Cardiovascular:      Rate and Rhythm: Normal rate and regular rhythm. Pulses: Normal pulses.    Pulmonary:      Effort: Pulmonary effort is normal.   Abdominal:      General: Abdomen is flat. There is no distension. Palpations: There is no mass. Tenderness: There is abdominal tenderness (epigastrium). There is no guarding. Musculoskeletal:         General: No swelling. Cervical back: Normal range of motion. Skin:     General: Skin is warm and dry. Neurological:      General: No focal deficit present. Mental Status: She is alert and oriented to person, place, and time. Psychiatric:         Mood and Affect: Mood normal.         Behavior: Behavior normal.         Thought Content: Thought content normal.         Judgment: Judgment normal.             Assessment/Plan:  April was seen today for follow-up from hospital.    Diagnoses and all orders for this visit:    C. difficile colitis  -     MN DISCHARGE MEDS RECONCILED W/ CURRENT OUTPATIENT MED LIST  -     MN DISCHARGE MEDS RECONCILED W/ CURRENT OUTPATIENT MED LIST  -     CBC WITH AUTO DIFFERENTIAL; Future    Dizziness  -     CBC WITH AUTO DIFFERENTIAL;  Future    Acute gastritis, presence of bleeding unspecified, unspecified gastritis type    Rectal bleeding    Diarrhea of infectious origin    Reports from [facility] [employer] [facility] clinic reviewed and discussed with the patient discharge meds reviewed  Patient to increase hydration    Medical Decision Making: moderate complexity

## 2021-09-15 NOTE — ASSESSMENT & PLAN NOTE
Patient states stools are starting to be formed but she sees blood in the stool she is scheduled to follow-up next week we will check hemoglobin and hematocrit and check white cell count and platelet count today.

## 2021-09-28 ENCOUNTER — OFFICE VISIT (OUTPATIENT)
Dept: PRIMARY CARE CLINIC | Age: 41
End: 2021-09-28
Payer: COMMERCIAL

## 2021-09-28 VITALS
SYSTOLIC BLOOD PRESSURE: 142 MMHG | BODY MASS INDEX: 33.12 KG/M2 | DIASTOLIC BLOOD PRESSURE: 98 MMHG | OXYGEN SATURATION: 99 % | WEIGHT: 194 LBS | TEMPERATURE: 97.1 F | HEIGHT: 64 IN | HEART RATE: 101 BPM

## 2021-09-28 DIAGNOSIS — E04.1 THYROID NODULE: ICD-10-CM

## 2021-09-28 DIAGNOSIS — K62.5 RECTAL BLEEDING: ICD-10-CM

## 2021-09-28 DIAGNOSIS — R10.30 LOWER ABDOMINAL PAIN: Primary | ICD-10-CM

## 2021-09-28 DIAGNOSIS — E16.2 HYPOGLYCEMIA: ICD-10-CM

## 2021-09-28 DIAGNOSIS — A09 DIARRHEA OF INFECTIOUS ORIGIN: ICD-10-CM

## 2021-09-28 DIAGNOSIS — I10 UNCONTROLLED HYPERTENSION: ICD-10-CM

## 2021-09-28 DIAGNOSIS — A04.72 C. DIFFICILE COLITIS: ICD-10-CM

## 2021-09-28 DIAGNOSIS — R59.0 CERVICAL ADENOPATHY: ICD-10-CM

## 2021-09-28 PROCEDURE — 99214 OFFICE O/P EST MOD 30 MIN: CPT | Performed by: INTERNAL MEDICINE

## 2021-09-28 RX ORDER — OMEPRAZOLE 40 MG/1
CAPSULE, DELAYED RELEASE ORAL
COMMUNITY
Start: 2021-09-21 | End: 2021-09-28

## 2021-09-28 ASSESSMENT — PATIENT HEALTH QUESTIONNAIRE - PHQ9
SUM OF ALL RESPONSES TO PHQ QUESTIONS 1-9: 0
1. LITTLE INTEREST OR PLEASURE IN DOING THINGS: 0
2. FEELING DOWN, DEPRESSED OR HOPELESS: 0
SUM OF ALL RESPONSES TO PHQ QUESTIONS 1-9: 0
SUM OF ALL RESPONSES TO PHQ9 QUESTIONS 1 & 2: 0
SUM OF ALL RESPONSES TO PHQ QUESTIONS 1-9: 0

## 2021-09-28 ASSESSMENT — ENCOUNTER SYMPTOMS
NAUSEA: 0
SORE THROAT: 0
EYES NEGATIVE: 1
DIARRHEA: 0
SHORTNESS OF BREATH: 0
ABDOMINAL PAIN: 1
CHEST TIGHTNESS: 0
VOMITING: 0
WHEEZING: 0
CONSTIPATION: 0
VOICE CHANGE: 0
BACK PAIN: 0

## 2021-09-28 NOTE — ASSESSMENT & PLAN NOTE
Symptoms resolved, patient understands she could still harbor the infection she will follow with [facility] regarding any needed repeat cultures.

## 2021-09-28 NOTE — ASSESSMENT & PLAN NOTE
Blood sugars are not less than 70, patient feels shaky. Patient advised 5-6 small meals and always to take protein with the starch and avoid free sugars.

## 2021-09-28 NOTE — PROGRESS NOTES
HPI:  Patient comes in today for complaint of persistent abdominal pain patient points to the mid and lower abdomen on the right side. The patient had upper scope and colonoscopy in the past she has evidence of gastritis but the location of pain does not coincide. Patient has also diverticulosis without diverticulitis. Patient also is complaining of feeling dizzy and she checks her blood sugar and it is in the 70s or 80s but she feels shaky. Patient also is complaining of swelling in the right side of her neck and tenderness, she points to the anterior cervical nodes. Patient recently diagnosed with C. difficile colitis and completed the treatment, she is [facility]. She states her symptoms have resolved and there is no repeat test done yet. Review of Systems   Constitutional: Negative for chills, fever and unexpected weight change. HENT: Negative for dental problem (Recently evaluated last week by the dentist.), ear pain, postnasal drip, sore throat and voice change. Eyes: Negative. Respiratory: Negative for chest tightness, shortness of breath and wheezing. Cardiovascular: Negative for chest pain and leg swelling. Gastrointestinal: Positive for abdominal pain. Negative for constipation, diarrhea, nausea and vomiting. Genitourinary: Negative for dysuria, flank pain and hematuria. Musculoskeletal: Negative for back pain, gait problem and joint swelling. Skin: Negative for rash and wound. Neurological: Positive for dizziness (See HPI). Psychiatric/Behavioral: Negative.          Past Medical History:   Diagnosis Date    Cervicalgia     Cryptogenic stroke ([facility] [address] 11/7/2019    Diverticular disease     Gastritis     Hyperlipidemia     Hypertension     Nontoxic multinodular goiter     Prediabetes     Unspecified cerebral artery occlusion with cerebral infarction     Vertigo     Vitamin B12 deficiency      Past Surgical History:   Procedure Laterality Date    or G  ABDOMEN:  Soft, discomfort with palpation in the right side of the abdomen mid and lower with no rebound no palpable masses. No renal or aortic bruits. EXTREMITIES: No ankle edema bilaterally. Neuro: No focal deficit. Lab Results   Component Value Date    WBC 7.5 09/14/2021    HGB 12.4 09/14/2021    HCT 39.2 09/14/2021     09/14/2021    CHOL 168 08/11/2021    TRIG 214 08/11/2021    HDL 48 08/11/2021    ALT 19 08/11/2021    AST 21 08/11/2021     08/11/2021    K 4.3 08/11/2021     08/11/2021    CREATININE 0.63 08/11/2021    BUN 12 08/11/2021    CO2 23 08/11/2021    TSH 1.590 04/27/2021    INR 1.0 11/07/2019    LABA1C 5.5 08/11/2021    LABMICR 16 08/11/2021        Impression/Plan:    1. Lower abdominal pain  -     CT ABDOMEN PELVIS WO CONTRAST Additional Contrast? Oral; Future  2. C. difficile colitis  Assessment & Plan:  Symptoms resolved, patient understands she could still harbor the infection she will [facility] regarding any needed repeat cultures. 3. Thyroid nodule  Assessment & Plan:  Reports of ultrasound June 2021 shows a nodule of 1.5 cm and another nodule of 0.8 no change in the size of the nodule from the year prior. The patient will see ENT for further follow-up on thyroid nodules. 4. Rectal bleeding  5. Diarrhea of infectious origin  Assessment & Plan:  Diarrhea resolved per patient history. 6. Cervical adenopathy  7. Hypoglycemia  Assessment & Plan:  Blood sugars are not less than 70, patient feels shaky. Patient advised 5-6 small meals and always to take protein with the starch and avoid free sugars. 8. Uncontrolled hypertension  The patient will monitor her blood pressure at home and bring her readings and machine. Patient did not take her blood pressure medicine yet today, she is also anxious about her abdominal pain.

## 2021-09-28 NOTE — ASSESSMENT & PLAN NOTE
Reports of ultrasound June 2021 shows a nodule of 1.5 cm and another nodule of 0.8 no change in the size of the nodule from the year prior. The patient will see ENT for further follow-up on thyroid nodules.

## 2021-10-05 ENCOUNTER — VIRTUAL VISIT (OUTPATIENT)
Dept: PRIMARY CARE CLINIC | Age: 41
End: 2021-10-05
Payer: COMMERCIAL

## 2021-10-05 DIAGNOSIS — R50.9 FEVER, UNSPECIFIED FEVER CAUSE: ICD-10-CM

## 2021-10-05 DIAGNOSIS — R30.0 DYSURIA: Primary | ICD-10-CM

## 2021-10-05 DIAGNOSIS — R10.9 RIGHT FLANK PAIN: ICD-10-CM

## 2021-10-05 DIAGNOSIS — N20.0 KIDNEY STONE ON RIGHT SIDE: ICD-10-CM

## 2021-10-05 PROCEDURE — 81002 URINALYSIS NONAUTO W/O SCOPE: CPT | Performed by: INTERNAL MEDICINE

## 2021-10-05 PROCEDURE — 99214 OFFICE O/P EST MOD 30 MIN: CPT | Performed by: INTERNAL MEDICINE

## 2021-10-05 RX ORDER — OXYCODONE HYDROCHLORIDE AND ACETAMINOPHEN 5; 325 MG/1; MG/1
1 TABLET ORAL EVERY 6 HOURS PRN
Qty: 12 TABLET | Refills: 0 | Status: SHIPPED | OUTPATIENT
Start: 2021-10-05 | End: 2021-10-08

## 2021-10-05 ASSESSMENT — ENCOUNTER SYMPTOMS
ABDOMINAL PAIN: 0
WHEEZING: 0
CHEST TIGHTNESS: 0
NAUSEA: 0
SHORTNESS OF BREATH: 0
VOMITING: 0
VOICE CHANGE: 0
SORE THROAT: 0

## 2021-10-05 NOTE — ASSESSMENT & PLAN NOTE
[facility] get them to order a CT scan as outpatient since she declined to go to the emergency room as they have advised her. Orders for urinalysis and urine culture sent to the lab.

## 2021-10-05 NOTE — PROGRESS NOTES
Iris Aragon (:  1980) is a 37 y.o. female,Established patient, here for evaluation of the following chief complaint(s): Flank Pain         ASSESSMENT/PLAN:  1. Dysuria  -     POCT Urinalysis no Micro  -     Culture, Urine; Future  -     CBC WITH AUTO DIFFERENTIAL; Future  2. Right flank pain  -     POCT Urinalysis no Micro  -     Culture, Urine; Future  -     CBC WITH AUTO DIFFERENTIAL; Future  -     oxyCODONE-acetaminophen (PERCOCET) 5-325 MG per tablet; Take 1 tablet by mouth every 6 hours as needed for Pain for up to 3 days. Intended supply: 3 days. Take lowest dose possible to manage pain, Disp-12 tablet, R-0Print  -     COMPREHENSIVE METABOLIC PANEL; Future  3. Kidney stone on right side  Assessment & Plan:  The patient will call [facility] get them to order a CT scan as outpatient since she declined to go to the emergency room as they have advised her. Orders for urinalysis and urine culture sent to the lab. Orders:  -     POCT Urinalysis no Micro  -     Culture, Urine; Future  -     CBC WITH AUTO DIFFERENTIAL; Future  -     oxyCODONE-acetaminophen (PERCOCET) 5-325 MG per tablet; Take 1 tablet by mouth every 6 hours as needed for Pain for up to 3 days. Intended supply: 3 days. Take lowest dose possible to manage pain, Disp-12 tablet, R-0Print  4. Fever, unspecified fever cause  -     COMPREHENSIVE METABOLIC PANEL; Future      No follow-ups on file. SUBJECTIVE/OBJECTIVE:  HPI: Patient is having pain right flank area, patient had an ultrasound ordered by urology at [facility] and it showed a kidney stone. Patient states she had a pain 9/10 yesterday last night and was unable to sleep. Review of Systems   Constitutional: Negative for chills, fever and unexpected weight change. Low-grade temp 99. HENT: Negative for postnasal drip, sore throat and voice change. Respiratory: Negative for chest tightness, shortness of breath and wheezing.     Cardiovascular: Negative for pertinent observable physical exam findings:-      Reports from [facility] urology visit is reviewed and discussed with the patient. 7 mm stone in the lower part of the right kidney with no evidence of hydronephrosis. On this date 10/5/2021 I have spent 34 minutes reviewing previous notes, test results and face to face (virtual) with the patient discussing the diagnosis and importance of compliance with the treatment plan as well as documenting on the day of the visit. Valente Albrecht, was evaluated through a synchronous (real-time) audio-video encounter. The patient (or guardian if applicable) is aware that this is a billable service. Verbal consent to proceed has been obtained within the past 12 months. The visit was conducted pursuant to the emergency declaration under the [address] [place] [address], [address] [place] [address] authority and the [employer] General Act. Patient identification was verified, and a caregiver was present when appropriate. The patient was located in a state where the provider was credentialed to provide care. An electronic signature was used to authenticate this note.     --Myesha De La O MD

## 2021-10-12 ENCOUNTER — PATIENT MESSAGE (OUTPATIENT)
Dept: PRIMARY CARE CLINIC | Age: 41
End: 2021-10-12

## 2021-10-12 RX ORDER — FLUCONAZOLE 150 MG/1
TABLET ORAL
Qty: 2 TABLET | Refills: 0 | Status: SHIPPED
Start: 2021-10-12 | End: 2021-10-19 | Stop reason: ALTCHOICE

## 2021-10-12 NOTE — TELEPHONE ENCOUNTER
From: April Iris Coleman  To: Harjeet Elliott MD  Sent: 10/12/2021 3:28 PM EDT  Subject: Prescription Question    Hi,   I had diverticulitis and the Er gave me flagyl and omnicef. I'm almost done with them but I now have thrush on my tongue and I'm on vacation. I've had this before. Could  call me in [facility] ? This is the phone /fax and address in [place] .      [address] [place] [address]    [phone] phone     [phone]  Thank you

## 2021-10-19 ENCOUNTER — TELEMEDICINE (OUTPATIENT)
Dept: PRIMARY CARE CLINIC | Age: 41
End: 2021-10-19
Payer: COMMERCIAL

## 2021-10-19 DIAGNOSIS — J02.9 PHARYNGITIS, UNSPECIFIED ETIOLOGY: ICD-10-CM

## 2021-10-19 DIAGNOSIS — B49 FUNGAL ESOPHAGITIS: ICD-10-CM

## 2021-10-19 DIAGNOSIS — K57.32 DIVERTICULITIS OF LARGE INTESTINE WITHOUT PERFORATION OR ABSCESS WITHOUT BLEEDING: ICD-10-CM

## 2021-10-19 DIAGNOSIS — K20.80 FUNGAL ESOPHAGITIS: ICD-10-CM

## 2021-10-19 DIAGNOSIS — B37.0 ORAL THRUSH: Primary | ICD-10-CM

## 2021-10-19 PROCEDURE — 99214 OFFICE O/P EST MOD 30 MIN: CPT | Performed by: INTERNAL MEDICINE

## 2021-10-19 RX ORDER — ASPIRIN 81 MG/1
81 TABLET ORAL DAILY
Qty: 90 TABLET | Refills: 0 | Status: SHIPPED
Start: 2021-10-19 | End: 2022-03-01 | Stop reason: SDUPTHER

## 2021-10-19 RX ORDER — CLOTRIMAZOLE 10 MG/1
10 LOZENGE ORAL; TOPICAL
Qty: 50 TABLET | Refills: 0 | Status: SHIPPED
Start: 2021-10-19 | End: 2021-10-26

## 2021-10-19 RX ORDER — ACYCLOVIR 200 MG/1
CAPSULE ORAL
Status: CANCELLED | OUTPATIENT
Start: 2021-10-19

## 2021-10-19 RX ORDER — ASPIRIN 81 MG/1
81 TABLET ORAL DAILY
COMMUNITY
End: 2021-10-19 | Stop reason: SDUPTHER

## 2021-10-19 NOTE — PROGRESS NOTES
Iris Aragon (:  1980) is a 37 y.o. female,Established patient, here for evaluation of the following chief complaint(s): Thrush         ASSESSMENT/PLAN:  1. Oral thrush  -     clotrimazole (MYCELEX) 10 MG phill; Take 1 tablet by mouth 5 times daily for 10 days Swish and swallow, Disp-50 tablet, R-0Normal  2. Pharyngitis, unspecified etiology  -     clotrimazole (MYCELEX) 10 MG phill; Take 1 tablet by mouth 5 times daily for 10 days Swish and swallow, Disp-50 tablet, R-0Normal  3. Fungal esophagitis  4. Diverticulitis of large intestine without perforation or abscess without bleeding    Reports from CT scan  and from CT scan October 15 is reviewed and discussed with the patient. She had mild diverticulitis, the oral thrush is as a result of the antibiotics. Patient diverticulitis have resolved according to her symptoms and according to the CT scan of October 15. Patient advised regarding the harm that potentially could happen from repeated CT scans especially if she gets contrast several times she need to increase her hydration drink lots of water and we will order a repeat kidney function after she received the IV contrast.  Return in about 3 weeks (around 2021). SUBJECTIVE/OBJECTIVE:  HPI patient is complaining of thrush in her throat and on her tongue. The patient states she went to the emergency room twice with left lower abdominal pain had a CT scan 10 /5 which showed early mild diverticulitis with no kidney stones or urolithiasis. Patient was placed on Flagyl and Omnicef finished the antibiotics about a week ago. This was followed by thrush in her throat and on her tongue patient states she feels that it is involving all of her throat and her esophagus. The patient had a repeat CT scanning of the abdomen with IV contrast October 15 and that shows no evidence of diverticulitis no abscess and no other abnormality.   Patient had an EGD done approximately a month ago was advised that she has gastritis and she need to stay on omeprazole. There was no report of Candida esophagitis at that time.     Review of Systems    Patient-Reported Vitals 10/19/2021   Patient-Reported Weight 194lbs   Patient-Reported Height 5' 4\"   Patient-Reported Systolic -   Patient-Reported Diastolic -   Patient-Reported Pulse 82   Patient-Reported Temperature 98.0   Patient-Reported SpO2 985        Physical Exam    [INSTRUCTIONS:  \"[x]\" Indicates a positive item  \"[]\" Indicates a negative item  -- DELETE ALL ITEMS NOT EXAMINED]    Constitutional: [x] Appears well-developed and well-nourished [x] No apparent distress      [] Abnormal -     Mental status: [x] Alert and awake  [x] Oriented to person/place/time [x] Able to follow commands    [] Abnormal -     Eyes:   EOM    [x]  Normal    [] Abnormal -   Sclera  [x]  Normal    [] Abnormal -          Discharge [x]  None visible   [] Abnormal -     HENT: [x] Normocephalic, atraumatic  [] Abnormal -   [x] Mouth/Throat: Mucous membranes are moist    External Ears [x] Normal  [] Abnormal -    Neck: [x] No visualized mass [] Abnormal -     Pulmonary/Chest: [x] Respiratory effort normal   [x] No visualized signs of difficulty breathing or respiratory distress        [] Abnormal -      Musculoskeletal:   [x] Normal gait with no signs of ataxia         [x] Normal range of motion of neck        [] Abnormal -     Neurological:        [x] No Facial Asymmetry (Cranial nerve 7 motor function) (limited exam due to video visit)          [x] No gaze palsy        [] Abnormal -          Skin:        [x] No significant exanthematous lesions or discoloration noted on facial skin         [] Abnormal -            Psychiatric:       [x] Normal Affect [] Abnormal -        [x] No Hallucinations    Other pertinent observable physical exam findings:-          On this date 10/19/2021 I have spent 39 minutes reviewing previous notes, test results and face to face (virtual) with the patient discussing the diagnosis and importance of compliance with the treatment plan as well as documenting on the day of the visit. Patient advised to increase her water drinking, medication sent to the pharmacy. Patient will require a BMP to monitor renal function after she received contrast this will be done with her return visit November 9, patient will call next week if thrush is not improving might need to give her oral Diflucan. Ameya Bonilla, was evaluated through a synchronous (real-time) audio-video encounter. The patient (or guardian if applicable) is aware that this is a billable service. Verbal consent to proceed has been obtained within the past 12 months. The visit was conducted pursuant to the emergency declaration under the [address] [place] [address] authority and the [employer] General Act. Patient identification was verified, and a caregiver was present when appropriate. The patient was located in a state where the provider was credentialed to provide care. An electronic signature was used to authenticate this note.     --Adela Chu MD

## 2021-10-20 ENCOUNTER — PATIENT MESSAGE (OUTPATIENT)
Dept: PRIMARY CARE CLINIC | Age: 41
End: 2021-10-20

## 2021-10-20 NOTE — TELEPHONE ENCOUNTER
From: April [facility]  To: Deandre Massey MD  Sent: 10/20/2021 3:33 PM EDT  Subject: Visit Follow-Up Question    Hi,   Can  please order fungal blood cultures?    Thank you

## 2021-10-21 ENCOUNTER — TELEPHONE (OUTPATIENT)
Dept: PRIMARY CARE CLINIC | Age: 41
End: 2021-10-21

## 2021-10-21 NOTE — TELEPHONE ENCOUNTER
Patient called stating she is having joint pain, is it a side effect from Diflucan or clotrimazole (MYCELEX) 10 MG phill         Also patient asking what she should take for the joint pain

## 2021-10-26 ENCOUNTER — VIRTUAL VISIT (OUTPATIENT)
Dept: PRIMARY CARE CLINIC | Age: 41
End: 2021-10-26
Payer: COMMERCIAL

## 2021-10-26 DIAGNOSIS — R19.7 DIARRHEA, UNSPECIFIED TYPE: ICD-10-CM

## 2021-10-26 DIAGNOSIS — R42 DIZZINESS: ICD-10-CM

## 2021-10-26 DIAGNOSIS — K20.90 ESOPHAGITIS DETERMINED BY ENDOSCOPY: ICD-10-CM

## 2021-10-26 DIAGNOSIS — R19.5 INCREASED MUCUS IN STOOL: ICD-10-CM

## 2021-10-26 DIAGNOSIS — B00.9 HERPES SIMPLEX: ICD-10-CM

## 2021-10-26 DIAGNOSIS — B37.0 ORAL THRUSH: Primary | ICD-10-CM

## 2021-10-26 DIAGNOSIS — J02.9 PHARYNGITIS, UNSPECIFIED ETIOLOGY: ICD-10-CM

## 2021-10-26 DIAGNOSIS — R68.83 CHILLS: ICD-10-CM

## 2021-10-26 PROCEDURE — 99214 OFFICE O/P EST MOD 30 MIN: CPT | Performed by: INTERNAL MEDICINE

## 2021-10-26 RX ORDER — FLUCONAZOLE 100 MG/1
100 TABLET ORAL 2 TIMES DAILY
Qty: 14 TABLET | Refills: 0 | Status: SHIPPED | OUTPATIENT
Start: 2021-10-26 | End: 2021-11-02

## 2021-10-26 RX ORDER — ACYCLOVIR 800 MG/1
800 TABLET ORAL 2 TIMES DAILY
Qty: 20 TABLET | Refills: 0 | Status: SHIPPED | OUTPATIENT
Start: 2021-10-26 | End: 2021-11-05

## 2021-10-26 RX ORDER — ACYCLOVIR 200 MG/1
CAPSULE ORAL
Qty: 360 CAPSULE | Refills: 0 | Status: CANCELLED | OUTPATIENT
Start: 2021-10-26

## 2021-10-26 RX ORDER — PSYLLIUM SEED (WITH DEXTROSE)
1 POWDER (GRAM) ORAL 2 TIMES DAILY
Qty: 60 WAFER | Refills: 5 | Status: SHIPPED
Start: 2021-10-26 | End: 2022-03-01

## 2021-10-26 NOTE — PROGRESS NOTES
Iris Aragon (:  1980) is a 37 y.o. female,Established patient, here for evaluation of the following chief complaint(s): Thrush  Patient also states that she has soft stools she gets 3 bowel movements every day with increasing mucus. Patient has history of C. difficile colitis and diverticulitis that was treated with several courses of antibiotics. Currently no blood in the stools and stools are not watery but soft. Patient also is complaining of feeling dizzy. She still is complaining of thrush in her mouth and she feels it is down in her throat and in her esophagus. Patient is very concerned if she has fungus in her blood because of persistent chills. ASSESSMENT/PLAN:  1. Oral thrush  -     fluconazole (DIFLUCAN) 100 MG tablet; Take 1 tablet by mouth 2 times daily for 7 days, Disp-14 tablet, R-0Normal  2. Pharyngitis, unspecified etiology  -     fluconazole (DIFLUCAN) 100 MG tablet; Take 1 tablet by mouth 2 times daily for 7 days, Disp-14 tablet, R-0Normal  3. Esophagitis determined by endoscopy  -     fluconazole (DIFLUCAN) 100 MG tablet; Take 1 tablet by mouth 2 times daily for 7 days, Disp-14 tablet, R-0Normal  4. Diarrhea, unspecified type  -     Psyllium (METAMUCIL) WAFR; Take 1 Wafer by mouth 2 times daily, Disp-60 Wafer, R-5Normal  5. Increased mucus in stool  -     Psyllium (METAMUCIL) WAFR; Take 1 Wafer by mouth 2 times daily, Disp-60 Wafer, R-5Normal  6. Herpes simplex  -     acyclovir (ZOVIRAX) 800 MG tablet; Take 1 tablet by mouth 2 times daily for 10 days If needed for herpes simplex flair, Disp-20 tablet, R-0Normal  7. Chills  -     Culture, Blood 2; Future  -     Culture, Fungus, Blood; Future  -     CBC Auto Differential; Future  8. Dizziness  -     Culture, Blood 2; Future  -     Culture, Fungus, Blood; Future  -     CBC Auto Differential; Future      No follow-ups on file.        SUBJECTIVE/OBJECTIVE:  HPI    Review of Systems    Patient-Reported Vitals 10/26/2021 Patient-Reported Weight 194lbs   Patient-Reported Height 5' 4\"   Patient-Reported Systolic 183   Patient-Reported Diastolic 76   Patient-Reported Pulse 88   Patient-Reported Temperature 97.4   Patient-Reported SpO2 97%        Physical Exam    [INSTRUCTIONS:  \"[x]\" Indicates a positive item  \"[]\" Indicates a negative item  -- DELETE ALL ITEMS NOT EXAMINED]    Constitutional: [x] Appears well-developed and well-nourished [x] No apparent distress      [] Abnormal -     Mental status: [x] Alert and awake  [x] Oriented to person/place/time [x] Able to follow commands    [] Abnormal -     Eyes:   EOM    [x]  Normal    [] Abnormal -   Sclera  [x]  Normal    [] Abnormal -          Discharge [x]  None visible   [] Abnormal -     HENT: [x] Normocephalic, atraumatic  [] Abnormal -   [x] Mouth/Throat: Mucous membranes are moist    External Ears [x] Normal  [] Abnormal -    Neck: [x] No visualized mass [] Abnormal -     Pulmonary/Chest: [x] Respiratory effort normal   [x] No visualized signs of difficulty breathing or respiratory distress        [] Abnormal -      Musculoskeletal:   [x] Normal gait with no signs of ataxia         [x] Normal range of motion of neck        [] Abnormal -     Neurological:        [x] No Facial Asymmetry (Cranial nerve 7 motor function) (limited exam due to video visit)          [x] No gaze palsy        [] Abnormal -          Skin:        [x] No significant exanthematous lesions or discoloration noted on facial skin         [] Abnormal -            Psychiatric:       [x] Normal Affect [] Abnormal -        [x] No Hallucinations    Other pertinent observable physical exam findings:-    Abdominal exam patient is having some discomfort as she palpates her epigastric area and also the left upper quadrant but abdomen is soft and no distention. Anabel Benitez, was evaluated through a synchronous (real-time) audio-video encounter.  The patient (or guardian if applicable) is aware that this is a [employer] service. Verbal consent to proceed has been obtained within the past 12 months. The visit was conducted pursuant to the emergency declaration under the [address], [address] [place] [address] authority and the [employer] and [employer] General Act. Patient identification was verified, and a caregiver was present when appropriate. The patient was located in a state where the provider was credentialed to provide care. Lab ordered and patient will be placed on Diflucan 100 mg twice daily for 7 days empirically, last EGD done approximately 6 weeks ago at the [facility] at that time she had esophagitis but did not have fungal esophagitis. She has been treated with several courses of antibiotics following that scope for her diverticulitis. Patient currently has some discomfort in the left upper quadrant and in the epigastric area with palpation. Blood cultures ordered and CBC ordered. Patient advised to increase fluids and salt intake eat some saltine crackers every day. Added Metamucil wafer for the soft stools. Repeat EGD will be required if patient continues to be symptomatic. Patient will call to come in sooner if she continues to be dizzy or having abdominal pain or any symptoms she is scheduled to be seen November 9 but would be happy to see her sooner if she is not better. An electronic signature was used to authenticate this note.     --Lora Shukla MD

## 2021-10-27 ENCOUNTER — PATIENT MESSAGE (OUTPATIENT)
Dept: PRIMARY CARE CLINIC | Age: 41
End: 2021-10-27

## 2021-10-27 DIAGNOSIS — R30.0 DYSURIA: Primary | ICD-10-CM

## 2021-10-28 ENCOUNTER — TELEPHONE (OUTPATIENT)
Dept: PRIMARY CARE CLINIC | Age: 41
End: 2021-10-28

## 2021-10-28 NOTE — TELEPHONE ENCOUNTER
Patient called to let us know she sent  a message and asked that Dr read and prescribe her something else for the hives the Diflucan caused her. Patient said she can not go al weekend with the hives

## 2021-10-28 NOTE — TELEPHONE ENCOUNTER
Discussed with the patient patient had hives on her neck and on her eyebrows when she took the Diflucan. She states that she had a similar reaction to Diflucan in the past, patient advised to stop the Diflucan please add Diflucan to the list of patient's allergies. Patient advised to use only the Mycelex approach and to contact [facility] for an EGD if she continues to have throat problems. Patient voices understanding. Hives are resolved with Benadryl.

## 2021-11-01 DIAGNOSIS — R50.9 FEVER, UNSPECIFIED FEVER CAUSE: ICD-10-CM

## 2021-11-01 DIAGNOSIS — R73.03 PREDIABETES: ICD-10-CM

## 2021-11-01 DIAGNOSIS — N20.0 KIDNEY STONE ON RIGHT SIDE: ICD-10-CM

## 2021-11-01 DIAGNOSIS — E78.5 HYPERLIPIDEMIA, UNSPECIFIED HYPERLIPIDEMIA TYPE: ICD-10-CM

## 2021-11-01 DIAGNOSIS — N39.0 FREQUENT UTI: ICD-10-CM

## 2021-11-01 DIAGNOSIS — E66.09 CLASS 1 OBESITY DUE TO EXCESS CALORIES WITH SERIOUS COMORBIDITY AND BODY MASS INDEX (BMI) OF 33.0 TO 33.9 IN ADULT: ICD-10-CM

## 2021-11-01 DIAGNOSIS — M25.531 PAIN OF BOTH WRIST JOINTS: ICD-10-CM

## 2021-11-01 DIAGNOSIS — I10 ESSENTIAL HYPERTENSION: ICD-10-CM

## 2021-11-01 DIAGNOSIS — M25.542 ARTHRALGIA OF BOTH HANDS: ICD-10-CM

## 2021-11-01 DIAGNOSIS — M79.10 MYALGIA: ICD-10-CM

## 2021-11-01 DIAGNOSIS — M25.532 PAIN OF BOTH WRIST JOINTS: ICD-10-CM

## 2021-11-01 DIAGNOSIS — R30.0 DYSURIA: ICD-10-CM

## 2021-11-01 DIAGNOSIS — R68.83 CHILLS: ICD-10-CM

## 2021-11-01 DIAGNOSIS — M25.541 ARTHRALGIA OF BOTH HANDS: ICD-10-CM

## 2021-11-01 DIAGNOSIS — R10.9 RIGHT FLANK PAIN: ICD-10-CM

## 2021-11-01 DIAGNOSIS — S40.022A CONTUSION OF LEFT UPPER EXTREMITY, INITIAL ENCOUNTER: ICD-10-CM

## 2021-11-01 DIAGNOSIS — R42 DIZZINESS: ICD-10-CM

## 2021-11-01 LAB
ALBUMIN SERPL-MCNC: 4.5 G/DL (ref 3.5–5.2)
ALP BLD-CCNC: 81 U/L (ref 35–104)
ALT SERPL-CCNC: 18 U/L (ref 0–32)
ANION GAP SERPL CALCULATED.3IONS-SCNC: 11 MMOL/L (ref 7–16)
AST SERPL-CCNC: 17 U/L (ref 0–31)
BACTERIA: ABNORMAL /HPF
BASOPHILS ABSOLUTE: 0.04 E9/L (ref 0–0.2)
BASOPHILS RELATIVE PERCENT: 0.6 % (ref 0–2)
BILIRUB SERPL-MCNC: <0.2 MG/DL (ref 0–1.2)
BILIRUBIN URINE: NEGATIVE
BLOOD, URINE: NEGATIVE
BUN BLDV-MCNC: 14 MG/DL (ref 6–20)
CALCIUM SERPL-MCNC: 9.5 MG/DL (ref 8.6–10.2)
CHLORIDE BLD-SCNC: 104 MMOL/L (ref 98–107)
CHOLESTEROL, TOTAL: 161 MG/DL (ref 0–199)
CLARITY: CLEAR
CO2: 24 MMOL/L (ref 22–29)
COLOR: YELLOW
CREAT SERPL-MCNC: 0.7 MG/DL (ref 0.5–1)
EOSINOPHILS ABSOLUTE: 0.15 E9/L (ref 0.05–0.5)
EOSINOPHILS RELATIVE PERCENT: 2.2 % (ref 0–6)
GFR AFRICAN AMERICAN: >60
GFR NON-AFRICAN AMERICAN: >60 ML/MIN/1.73
GLUCOSE BLD-MCNC: 132 MG/DL (ref 74–99)
GLUCOSE URINE: NEGATIVE MG/DL
HBA1C MFR BLD: 5.7 % (ref 4–5.6)
HCT VFR BLD CALC: 42 % (ref 34–48)
HDLC SERPL-MCNC: 48 MG/DL
HEMOGLOBIN: 13.3 G/DL (ref 11.5–15.5)
IMMATURE GRANULOCYTES #: 0.03 E9/L
IMMATURE GRANULOCYTES %: 0.4 % (ref 0–5)
KETONES, URINE: NEGATIVE MG/DL
LDL CHOLESTEROL CALCULATED: 95 MG/DL (ref 0–99)
LEUKOCYTE ESTERASE, URINE: ABNORMAL
LYMPHOCYTES ABSOLUTE: 1.87 E9/L (ref 1.5–4)
LYMPHOCYTES RELATIVE PERCENT: 27.9 % (ref 20–42)
MCH RBC QN AUTO: 29.3 PG (ref 26–35)
MCHC RBC AUTO-ENTMCNC: 31.7 % (ref 32–34.5)
MCV RBC AUTO: 92.5 FL (ref 80–99.9)
MICROALBUMIN UR-MCNC: 13.3 MG/L
MONOCYTES ABSOLUTE: 0.68 E9/L (ref 0.1–0.95)
MONOCYTES RELATIVE PERCENT: 10.1 % (ref 2–12)
NEUTROPHILS ABSOLUTE: 3.94 E9/L (ref 1.8–7.3)
NEUTROPHILS RELATIVE PERCENT: 58.8 % (ref 43–80)
NITRITE, URINE: NEGATIVE
PDW BLD-RTO: 12.4 FL (ref 11.5–15)
PH UA: 5 (ref 5–9)
PLATELET # BLD: 338 E9/L (ref 130–450)
PMV BLD AUTO: 8.8 FL (ref 7–12)
POTASSIUM SERPL-SCNC: 4.6 MMOL/L (ref 3.5–5)
PROTEIN UA: NEGATIVE MG/DL
RBC # BLD: 4.54 E12/L (ref 3.5–5.5)
RBC UA: ABNORMAL /HPF (ref 0–2)
SODIUM BLD-SCNC: 139 MMOL/L (ref 132–146)
SPECIFIC GRAVITY UA: 1.02 (ref 1–1.03)
TOTAL PROTEIN: 6.8 G/DL (ref 6.4–8.3)
TRIGL SERPL-MCNC: 91 MG/DL (ref 0–149)
TSH SERPL DL<=0.05 MIU/L-ACNC: 0.85 UIU/ML (ref 0.27–4.2)
UROBILINOGEN, URINE: 0.2 E.U./DL
VLDLC SERPL CALC-MCNC: 18 MG/DL
WBC # BLD: 6.7 E9/L (ref 4.5–11.5)
WBC UA: ABNORMAL /HPF (ref 0–5)
YEAST: PRESENT /HPF

## 2021-11-01 NOTE — TELEPHONE ENCOUNTER
Pt called asking if infectious disease referral was placed. Please place referral as I did not see one.

## 2021-11-01 NOTE — TELEPHONE ENCOUNTER
I discussed with the patient she understands that she needs to have a repeat EGD to find out if she actually have fungal esophagitis or not this has to be confirmed with EGD and biopsy patient states she will schedule with the [facility] gastroenterology. Patient states she is having pressure with urination and burning, urine shows no signs of infection but since the patient is symptomatic urine culture will be ordered. Patient is advised to call tomorrow and bring another sample if the sample she left today is discarded.

## 2021-11-02 LAB
HCT VFR BLD CALC: 41.9 % (ref 36–46)
HEMOGLOBIN: 13.4 G/DL (ref 12–16)
PLATELET # BLD: 357 K/ΜL
WBC # BLD: 7.94 10^3/ML

## 2021-11-04 DIAGNOSIS — R42 DIZZINESS: ICD-10-CM

## 2021-11-04 DIAGNOSIS — R68.83 CHILLS: ICD-10-CM

## 2021-11-05 DIAGNOSIS — I10 ESSENTIAL HYPERTENSION: ICD-10-CM

## 2021-11-05 DIAGNOSIS — R53.83 FATIGUE, UNSPECIFIED TYPE: ICD-10-CM

## 2021-11-05 DIAGNOSIS — K21.9 GASTROESOPHAGEAL REFLUX DISEASE, UNSPECIFIED WHETHER ESOPHAGITIS PRESENT: ICD-10-CM

## 2021-11-05 DIAGNOSIS — E78.5 HYPERLIPIDEMIA, UNSPECIFIED HYPERLIPIDEMIA TYPE: ICD-10-CM

## 2021-11-05 DIAGNOSIS — M79.10 MYALGIA: ICD-10-CM

## 2021-11-05 DIAGNOSIS — R00.1 BRADYCARDIA, UNSPECIFIED: ICD-10-CM

## 2021-11-05 DIAGNOSIS — D64.9 ANEMIA, UNSPECIFIED TYPE: ICD-10-CM

## 2021-11-05 LAB
FERRITIN: 80 NG/ML
FOLATE: 11.4 NG/ML (ref 4.8–24.2)
MAGNESIUM: 2 MG/DL (ref 1.6–2.6)
VITAMIN B-12: 1700 PG/ML (ref 211–946)
VITAMIN D 25-HYDROXY: 30 NG/ML (ref 30–100)

## 2021-11-09 ENCOUNTER — OFFICE VISIT (OUTPATIENT)
Dept: PRIMARY CARE CLINIC | Age: 41
End: 2021-11-09
Payer: COMMERCIAL

## 2021-11-09 VITALS
HEIGHT: 64 IN | HEART RATE: 99 BPM | DIASTOLIC BLOOD PRESSURE: 80 MMHG | OXYGEN SATURATION: 99 % | BODY MASS INDEX: 33.63 KG/M2 | TEMPERATURE: 97.2 F | SYSTOLIC BLOOD PRESSURE: 128 MMHG | RESPIRATION RATE: 18 BRPM | WEIGHT: 197 LBS

## 2021-11-09 DIAGNOSIS — M46.1 SACROILIITIS (HCC): ICD-10-CM

## 2021-11-09 DIAGNOSIS — M54.17 LUMBOSACRAL RADICULOPATHY: Primary | ICD-10-CM

## 2021-11-09 DIAGNOSIS — N92.0 MENORRHAGIA WITH REGULAR CYCLE: ICD-10-CM

## 2021-11-09 DIAGNOSIS — R35.0 FREQUENCY OF URINATION: ICD-10-CM

## 2021-11-09 DIAGNOSIS — K29.50 CHRONIC GASTRITIS WITHOUT BLEEDING, UNSPECIFIED GASTRITIS TYPE: ICD-10-CM

## 2021-11-09 LAB
BILIRUBIN, POC: NORMAL
BLOOD URINE, POC: NORMAL
CLARITY, POC: CLEAR
COLOR, POC: NORMAL
GLUCOSE URINE, POC: NORMAL
HCT VFR BLD CALC: 42.4 % (ref 34–48)
HEMOGLOBIN: 13.7 G/DL (ref 11.5–15.5)
KETONES, POC: NORMAL
LEUKOCYTE EST, POC: NORMAL
MCH RBC QN AUTO: 29.8 PG (ref 26–35)
MCHC RBC AUTO-ENTMCNC: 32.3 % (ref 32–34.5)
MCV RBC AUTO: 92.2 FL (ref 80–99.9)
NITRITE, POC: NORMAL
PDW BLD-RTO: 12.4 FL (ref 11.5–15)
PH, POC: 6
PLATELET # BLD: 343 E9/L (ref 130–450)
PMV BLD AUTO: 8.6 FL (ref 7–12)
PROTEIN, POC: NORMAL
RBC # BLD: 4.6 E12/L (ref 3.5–5.5)
SPECIFIC GRAVITY, POC: 1
UROBILINOGEN, POC: NORMAL
WBC # BLD: 8.4 E9/L (ref 4.5–11.5)

## 2021-11-09 PROCEDURE — 99214 OFFICE O/P EST MOD 30 MIN: CPT | Performed by: INTERNAL MEDICINE

## 2021-11-09 PROCEDURE — 81002 URINALYSIS NONAUTO W/O SCOPE: CPT | Performed by: INTERNAL MEDICINE

## 2021-11-09 RX ORDER — PREDNISONE 10 MG/1
10 TABLET ORAL DAILY
Qty: 20 TABLET | Refills: 0 | Status: SHIPPED | OUTPATIENT
Start: 2021-11-09 | End: 2021-11-29

## 2021-11-09 RX ORDER — GABAPENTIN 100 MG/1
100 CAPSULE ORAL NIGHTLY
Qty: 30 CAPSULE | Refills: 0 | Status: SHIPPED
Start: 2021-11-09 | End: 2021-11-30

## 2021-11-09 ASSESSMENT — ENCOUNTER SYMPTOMS
CHEST TIGHTNESS: 0
BACK PAIN: 1
VOICE CHANGE: 0
ABDOMINAL PAIN: 0
VOMITING: 0
NAUSEA: 0
WHEEZING: 0
SORE THROAT: 0
SHORTNESS OF BREATH: 0

## 2021-11-09 NOTE — PROGRESS NOTES
HPI:  Patient comes in today for complaint of pain in the left side of her iliac area that shoots down to her left leg, patient points out as sciatic distribution. Patient states she applies ice that helps a little bit. She experiences also some tingling in addition to the pain. Patient also have had very heavy. With blood clots she is scheduled to see her gynecologist today. Review of Systems   Constitutional: Negative for chills, fever and unexpected weight change. HENT: Negative for postnasal drip, sore throat and voice change. Respiratory: Negative for chest tightness, shortness of breath and wheezing. Cardiovascular: Negative for chest pain and leg swelling. Gastrointestinal: Negative for abdominal pain, nausea and vomiting. Musculoskeletal: Positive for back pain (Pain lower back as per HPI with referral to the left lower extremity. ). Negative for gait problem and joint swelling. Skin: Negative for rash and wound. Neurological: Negative. Psychiatric/Behavioral: Negative.          Past Medical History:   Diagnosis Date    Cervicalgia     [facility]    Diverticular disease     Gastritis     Hyperlipidemia     Hypertension     Nontoxic multinodular goiter     Prediabetes     Unspecified cerebral artery occlusion with cerebral infarction     Vertigo     Vitamin B12 deficiency      Past Surgical History:   Procedure Laterality Date    ABDOMINOPLASTY  2011    SIGMOIDOSCOPY      UPPER GASTROINTESTINAL ENDOSCOPY       Family History   Problem Relation Age of Onset    High Cholesterol Mother     Parkinsonism Mother     High Blood Pressure Mother     High Blood Pressure Father     Diabetes Father       Social History     Tobacco Use    Smoking status: Never Smoker    Smokeless tobacco: Never Used   Vaping Use    Vaping Use: Never used   Substance Use Topics    Alcohol use: No    Drug use: No        Current Outpatient Medications on File Prior to Visit   Medication Sig Dispense Refill    Psyllium (METAMUCIL) WAFR Take 1 Wafer by mouth 2 times daily 60 Wafer 5    aspirin 81 MG EC tablet Take 1 tablet by mouth daily 90 tablet 0    Cholecalciferol (VITAMIN D3) 125 MCG (5000 UT) TABS Take 1 tablet by mouth daily 30 tablet 5    saccharomyces boulardii (FLORASTOR) 250 MG capsule Take 250 mg by mouth 2 times daily      omeprazole 20 MG EC tablet Take 1 tablet by mouth 2 times daily 180 tablet 3    lisinopril (PRINIVIL;ZESTRIL) 2.5 MG tablet take 1 tablet by mouth once daily      CRESTOR 20 MG tablet Take 1 tablet by mouth daily 90 tablet 3    sucralfate (CARAFATE) 1 GM tablet Take 1 tablet by mouth 4 times daily 120 tablet 5    FEROSUL 325 (65 Fe) MG tablet take 1 tablet by mouth every other day      Cyanocobalamin (VITAMIN B 12 PO) Take by mouth      acetaminophen (TYLENOL) 500 MG tablet Take 500 mg by mouth every 6 hours as needed for Pain (tid daily currently x 1 month)       No current facility-administered medications on file prior to visit. PE:  VS:  /80   Pulse 99   Temp 97.2 °F (36.2 °C)   Resp 18   Ht 5' 4\" (1.626 m)   Wt 197 lb (89.4 kg)   SpO2 99%   BMI 33.81 kg/m²   General:  Alert and oriented, NAD  HEENT: Ear auricles are normal, EOMI, Conjunctivae clear  NECK:  Supple without adenopathy or thyromegaly, no carotid bruits. LUNGS:  CTA all fields, symmetrical expansion no wheezes no rales. HEART:  RRR without M, R, or G  ABDOMEN:  Soft and nontender without palpable abnormalities, No renal or aortic bruits. EXTREMITIES: No ankle edema bilaterally. Patient has a positive leg lift on the right as well as the left at 50 degrees with reproduction of her pain in the lower back. Tenderness over the left sacroiliac joint with palpation. Neuro: No focal deficit.     Lab Results   Component Value Date    WBC 7.94 11/02/2021    HGB 13.4 11/02/2021    HCT 41.9 11/02/2021     11/02/2021    CHOL 161 11/01/2021    TRIG 91 11/01/2021    HDL 48 11/01/2021    ALT 18 11/01/2021    AST 17 11/01/2021     11/01/2021    K 4.6 11/01/2021     11/01/2021    CREATININE 0.7 11/01/2021    BUN 14 11/01/2021    CO2 24 11/01/2021    TSH 0.850 11/01/2021    INR 1.0 11/07/2019    LABA1C 5.7 (H) 11/01/2021    LABMICR 13.3 11/01/2021        Impression/Plan:    1. Lumbosacral radiculopathy  Assessment & Plan:  History of sciatica in the past patient has positive leg lift bilaterally concern of a pinched nerve versus disc. Start gabapentin and prednisone and patient to continue to put alternate heat and ice ending with ice on the lower back. Orders:  -     XR LUMBAR SPINE (2-3 VIEWS); Future  -     predniSONE (DELTASONE) 10 MG tablet; Take 1 tablet by mouth daily for 20 days, Disp-20 tablet, R-0Normal  -     gabapentin (NEURONTIN) 100 MG capsule; Take 1 capsule by mouth nightly for 30 days. Intended supply: 30 days, Disp-30 capsule, R-0Normal  2. Menorrhagia with regular cycle  Assessment & Plan:  GYN to evaluate will require ultrasound of the uterus etc.   Orders:  -     CBC; Future  3. Chronic gastritis without bleeding, unspecified gastritis type  Assessment & Plan:  Patient back to the [facility] she is scheduled for an EGD in approximately 4 weeks. 4. Sacroiliitis ([address]  Patient might require injection in the left sacroiliac joint, she is advised to use the medication and to use heat alternating with ice ending with ice. Avoid excessive exercises pending results of the x-rays, may require CT or MRI of the lumbar spine if not improved.

## 2021-11-09 NOTE — ASSESSMENT & PLAN NOTE
History of sciatica in the past patient has positive leg lift bilaterally concern of a pinched nerve versus disc. Start gabapentin and prednisone and patient to continue to put alternate heat and ice ending with ice on the lower back.

## 2021-11-10 ENCOUNTER — TELEPHONE (OUTPATIENT)
Dept: PRIMARY CARE CLINIC | Age: 41
End: 2021-11-10

## 2021-11-10 NOTE — TELEPHONE ENCOUNTER
Patient was seen yesterday there is no indication of hives on her examination and would prefer not to use the medication that has caused her hives in the past.

## 2021-11-10 NOTE — TELEPHONE ENCOUNTER
Patient called stating that she need another script for diflucan for a weeks supply due to thrush coming back in her mouth. She said she took it after she thought it gave her hives and didn't get hives again. Please send to [facility]. Thank you.

## 2021-11-29 ENCOUNTER — TELEPHONE (OUTPATIENT)
Dept: PRIMARY CARE CLINIC | Age: 41
End: 2021-11-29

## 2021-11-30 ENCOUNTER — VIRTUAL VISIT (OUTPATIENT)
Dept: PRIMARY CARE CLINIC | Age: 41
End: 2021-11-30
Payer: COMMERCIAL

## 2021-11-30 DIAGNOSIS — M54.40 CHRONIC BILATERAL LOW BACK PAIN WITH SCIATICA, SCIATICA LATERALITY UNSPECIFIED: ICD-10-CM

## 2021-11-30 DIAGNOSIS — R73.03 PREDIABETES: ICD-10-CM

## 2021-11-30 DIAGNOSIS — M54.17 LUMBOSACRAL RADICULOPATHY: Primary | ICD-10-CM

## 2021-11-30 DIAGNOSIS — E66.09 CLASS 1 OBESITY DUE TO EXCESS CALORIES WITH SERIOUS COMORBIDITY AND BODY MASS INDEX (BMI) OF 33.0 TO 33.9 IN ADULT: ICD-10-CM

## 2021-11-30 DIAGNOSIS — R09.89 LABILE HYPERTENSION: ICD-10-CM

## 2021-11-30 DIAGNOSIS — G89.29 CHRONIC BILATERAL LOW BACK PAIN WITH SCIATICA, SCIATICA LATERALITY UNSPECIFIED: ICD-10-CM

## 2021-11-30 DIAGNOSIS — N20.0 KIDNEY STONE: ICD-10-CM

## 2021-11-30 DIAGNOSIS — E88.81 METABOLIC SYNDROME: ICD-10-CM

## 2021-11-30 DIAGNOSIS — Z86.73 HISTORY OF CVA (CEREBROVASCULAR ACCIDENT): ICD-10-CM

## 2021-11-30 DIAGNOSIS — B37.0 THRUSH, ORAL: ICD-10-CM

## 2021-11-30 PROCEDURE — 99215 OFFICE O/P EST HI 40 MIN: CPT | Performed by: INTERNAL MEDICINE

## 2021-11-30 RX ORDER — FLUCONAZOLE 100 MG/1
TABLET ORAL
COMMUNITY
Start: 2021-11-29 | End: 2022-03-01 | Stop reason: ALTCHOICE

## 2021-11-30 ASSESSMENT — ENCOUNTER SYMPTOMS
NAUSEA: 0
VOMITING: 0
WHEEZING: 0
SHORTNESS OF BREATH: 0
SORE THROAT: 0
CHEST TIGHTNESS: 0
VOICE CHANGE: 0
BACK PAIN: 1
ABDOMINAL PAIN: 0

## 2021-11-30 NOTE — PROGRESS NOTES
Iris Aragon (:  1980) is a 37 y.o. female,Established patient, here for evaluation of the following chief complaint(s): Follow-up         ASSESSMENT/PLAN:  1. Lumbosacral radiculopathy  Comments:  Okay to discontinue gabapentin and referral for spine center at [facility] is initiated. 2. Chronic bilateral low back pain with sciatica, sciatica laterality unspecified  Comments:  Referral for spine center at [facility]. 3. Kidney stone  Comments:  Patient is scheduled for appointment at [facility] for further evaluation, was told that this might not be a kidney stone, MRI will be ordered by [facility]  4. Thrush, oral  Comments:  Patient will continue Diflucan she is counseled regarding avoiding antibiotics, and to continue follow-up with ID at [facility]. 5. Labile hypertension  -     Comprehensive Metabolic Panel; Future  -     Hemoglobin A1C; Future  -     Lipid Panel; Future  -     CBC Auto Differential; Future  -     Microalbumin / Creatinine Urine Ratio; Future  -     TSH without Reflex; Future  6. History of CVA (cerebrovascular accident)  -     Comprehensive Metabolic Panel; Future  -     Hemoglobin A1C; Future  -     Lipid Panel; Future  -     CBC Auto Differential; Future  -     Microalbumin / Creatinine Urine Ratio; Future  -     TSH without Reflex; Future  7. Metabolic syndrome  -     Comprehensive Metabolic Panel; Future  -     Hemoglobin A1C; Future  -     Lipid Panel; Future  -     CBC Auto Differential; Future  -     Microalbumin / Creatinine Urine Ratio; Future  -     TSH without Reflex; Future  8. Class 1 obesity due to excess calories with serious comorbidity and body mass index (BMI) of 33.0 to 33.9 in adult  -     Comprehensive Metabolic Panel; Future  -     Hemoglobin A1C; Future  -     Lipid Panel; Future  -     CBC Auto Differential; Future  -     Microalbumin / Creatinine Urine Ratio; Future  -     TSH without Reflex; Future  9.  Prediabetes  -     Comprehensive Metabolic Panel; Future  -     Hemoglobin A1C; Future  -     Lipid Panel; Future  -     CBC Auto Differential; Future  -     Microalbumin / Creatinine Urine Ratio; Future  -     TSH without Reflex; Future      No follow-ups on file. SUBJECTIVE/OBJECTIVE:  HPI: Patient is requesting virtual evaluation because she continues to have low back pain and states that she has some discomfort around the iliac area, she has tried gabapentin and did not feel well with the medication states also that prednisone has helped the inflammation some but she is currently off the medication. Patient have seen ID specialist in [address] [place] [address] regarding her persistent thrush in the mouth. Was started back on Diflucan patient was given medication with multiple refills per patient history. Patient also states that she saw a thyroid specialist at [facility] when she went to visit her brother and was told that the nodule in her goiter just need to be monitored. Patient is requesting a referral to the spine center at [address] [place] [address], she was evaluated there in the past for the thoracic spine. Review of Systems   Constitutional: Negative for chills, fever and unexpected weight change. HENT: Negative for postnasal drip, sore throat and voice change. Respiratory: Negative for chest tightness, shortness of breath and wheezing. Cardiovascular: Negative for chest pain and leg swelling. Gastrointestinal: Negative for abdominal pain, nausea and vomiting. Musculoskeletal: Positive for back pain (See HPI). Negative for gait problem and joint swelling. Skin: Negative for rash and wound. Neurological: Negative. Psychiatric/Behavioral: Negative.         Patient-Reported Vitals 11/30/2021   Patient-Reported Weight -   Patient-Reported Height -   Patient-Reported Systolic 091   Patient-Reported Diastolic 72   Patient-Reported Pulse 83   Patient-Reported Temperature -   Patient-Reported SpO2 -        Physical Exam    [INSTRUCTIONS:  \"[x]\" Indicates a positive item  \"[]\" Indicates a negative item  -- DELETE ALL ITEMS NOT EXAMINED]    Constitutional: [x] Appears well-developed and well-nourished [x] No apparent distress      [] Abnormal -     Mental status: [x] Alert and awake  [x] Oriented to person/place/time [x] Able to follow commands    [] Abnormal -     Eyes:   EOM    [x]  Normal    [] Abnormal -   Sclera  [x]  Normal    [] Abnormal -          Discharge [x]  None visible   [] Abnormal -     HENT: [x] Normocephalic, atraumatic  [] Abnormal -   [x] Mouth/Throat: Mucous membranes are moist    External Ears [x] Normal  [] Abnormal -    Neck: [x] No visualized mass [] Abnormal -     Pulmonary/Chest: [x] Respiratory effort normal   [x] No visualized signs of difficulty breathing or respiratory distress        [] Abnormal -      Musculoskeletal:   [x] Normal gait with no signs of ataxia         [x] Normal range of motion of neck        [] Abnormal -     Neurological:        [x] No Facial Asymmetry (Cranial nerve 7 motor function) (limited exam due to video visit)          [x] No gaze palsy        [] Abnormal -          Skin:        [x] No significant exanthematous lesions or discoloration noted on facial skin         [] Abnormal -            Psychiatric:       [x] Normal Affect [] Abnormal -        [x] No Hallucinations    Other pertinent observable physical exam findings:-          On this date 11/30/2021 I have spent 55 minutes reviewing previous notes, test results and face to face (virtual) with the patient discussing the diagnosis and importance of compliance with the treatment plan as well as documenting on the day of the visitEl Benitez, was evaluated through a synchronous (real-time) audio-video encounter. The patient (or guardian if applicable) is aware that this is a billable service. Verbal consent to proceed has been obtained within the past 12 months.  The visit was conducted pursuant to the emergency declaration under the [facility], [address] [place] [address] waiver authority and the [employer] and [employer] General Act. Patient identification was verified, and a caregiver was present when appropriate. The patient was located in a state where the provider was credentialed to provide care. An electronic signature was used to authenticate this note.     --Xander Lynn MD

## 2021-12-08 ENCOUNTER — TELEPHONE (OUTPATIENT)
Dept: PRIMARY CARE CLINIC | Age: 41
End: 2021-12-08

## 2021-12-09 NOTE — TELEPHONE ENCOUNTER
Pt called to see if referral for Spine ([place] [facility] and referral for infectious disease [facility]) have been placed. Informed pt that referrals have not been placed as of yet.

## 2021-12-15 ENCOUNTER — OFFICE VISIT (OUTPATIENT)
Dept: PRIMARY CARE CLINIC | Age: 41
End: 2021-12-15
Payer: COMMERCIAL

## 2021-12-15 VITALS
HEART RATE: 90 BPM | OXYGEN SATURATION: 99 % | BODY MASS INDEX: 33.46 KG/M2 | DIASTOLIC BLOOD PRESSURE: 88 MMHG | WEIGHT: 196 LBS | RESPIRATION RATE: 18 BRPM | TEMPERATURE: 97.5 F | HEIGHT: 64 IN | SYSTOLIC BLOOD PRESSURE: 138 MMHG

## 2021-12-15 DIAGNOSIS — R05.9 COUGH: ICD-10-CM

## 2021-12-15 DIAGNOSIS — R09.81 NASAL CONGESTION: Primary | ICD-10-CM

## 2021-12-15 DIAGNOSIS — H92.01 OTALGIA, RIGHT EAR: ICD-10-CM

## 2021-12-15 PROCEDURE — 99213 OFFICE O/P EST LOW 20 MIN: CPT | Performed by: INTERNAL MEDICINE

## 2021-12-15 RX ORDER — GUAIFENESIN 600 MG/1
600 TABLET, EXTENDED RELEASE ORAL 2 TIMES DAILY
Qty: 30 TABLET | Refills: 0 | Status: SHIPPED | OUTPATIENT
Start: 2021-12-15 | End: 2021-12-30

## 2021-12-15 RX ORDER — FLUTICASONE PROPIONATE 50 MCG
1 SPRAY, SUSPENSION (ML) NASAL 2 TIMES DAILY
Qty: 32 G | Refills: 3 | Status: SHIPPED
Start: 2021-12-15 | End: 2022-05-02

## 2021-12-15 RX ORDER — METRONIDAZOLE 7.5 MG/G
GEL VAGINAL
COMMUNITY
Start: 2021-12-10 | End: 2022-03-01 | Stop reason: ALTCHOICE

## 2021-12-15 RX ORDER — KETOCONAZOLE 20 MG/G
CREAM TOPICAL
COMMUNITY
Start: 2021-12-10 | End: 2022-03-01 | Stop reason: ALTCHOICE

## 2021-12-15 RX ORDER — NYSTATIN 100000 [USP'U]/G
POWDER TOPICAL
COMMUNITY
Start: 2021-12-10 | End: 2022-03-01

## 2021-12-15 ASSESSMENT — ENCOUNTER SYMPTOMS
VOMITING: 0
SHORTNESS OF BREATH: 0
ABDOMINAL PAIN: 0
WHEEZING: 0
CHEST TIGHTNESS: 0
VOICE CHANGE: 0
COUGH: 1
NAUSEA: 0
SORE THROAT: 0

## 2021-12-15 NOTE — PROGRESS NOTES
HPI:  Patient comes in today for complaint of cough and nasal congestion and mild chills for the past 4 to 5 days. Patient also states her right ear is painful. Review of Systems   Constitutional: Positive for chills. Negative for fever and unexpected weight change. HENT: Positive for congestion, ear pain (Right ear) and postnasal drip. Negative for sore throat and voice change. Respiratory: Positive for cough (Clearing of her throat. ). Negative for chest tightness, shortness of breath and wheezing. Cardiovascular: Negative for chest pain and leg swelling. Gastrointestinal: Negative for abdominal pain, nausea and vomiting. Genitourinary: Negative. Musculoskeletal: Negative for gait problem and joint swelling. Skin: Negative for rash and wound. Neurological: Negative. Psychiatric/Behavioral: Negative.          Past Medical History:   Diagnosis Date    Cervicalgia     [facility]    Diverticular disease     Gastritis     Hyperlipidemia     Hypertension     Nontoxic multinodular goiter     Prediabetes     Unspecified cerebral artery occlusion with cerebral infarction     Vertigo     Vitamin B12 deficiency      Past Surgical History:   Procedure Laterality Date    ABDOMINOPLASTY  2011    SIGMOIDOSCOPY      UPPER GASTROINTESTINAL ENDOSCOPY       Family History   Problem Relation Age of Onset    High Cholesterol Mother     Parkinsonism Mother     High Blood Pressure Mother     High Blood Pressure Father     Diabetes Father       Social History     Tobacco Use    Smoking status: Never Smoker    Smokeless tobacco: Never Used   Vaping Use    Vaping Use: Never used   Substance Use Topics    Alcohol use: No    Drug use: No        Current Outpatient Medications on File Prior to Visit   Medication Sig Dispense Refill    NYAMYC 644460 UNIT/GM powder       metroNIDAZOLE (METROGEL) 0.75 % vaginal gel       ketoconazole (NIZORAL) 2 % cream       fluconazole (DIFLUCAN) 100 MG tablet       Psyllium (METAMUCIL) WAFR Take 1 Wafer by mouth 2 times daily 60 Wafer 5    aspirin 81 MG EC tablet Take 1 tablet by mouth daily 90 tablet 0    Cholecalciferol (VITAMIN D3) 125 MCG (5000 UT) TABS Take 1 tablet by mouth daily 30 tablet 5    saccharomyces boulardii (FLORASTOR) 250 MG capsule Take 250 mg by mouth 2 times daily      omeprazole 20 MG EC tablet Take 1 tablet by mouth 2 times daily 180 tablet 3    lisinopril (PRINIVIL;ZESTRIL) 2.5 MG tablet take 1 tablet by mouth once daily      CRESTOR 20 MG tablet Take 1 tablet by mouth daily 90 tablet 3    sucralfate (CARAFATE) 1 GM tablet Take 1 tablet by mouth 4 times daily 120 tablet 5    FEROSUL 325 (65 Fe) MG tablet take 1 tablet by mouth every other day      Cyanocobalamin (VITAMIN B 12 PO) Take by mouth      acetaminophen (TYLENOL) 500 MG tablet Take 500 mg by mouth every 6 hours as needed for Pain (tid daily currently x 1 month)       No current facility-administered medications on file prior to visit. PE:  VS:  /88   Pulse 90   Temp 97.5 °F (36.4 °C)   Resp 18   Ht 5' 4\" (1.626 m)   Wt 196 lb (88.9 kg)   SpO2 99%   BMI 33.64 kg/m²   General:  Alert and oriented, NAD  HEENT: Ear auricles are normal, EOMI, Conjunctivae clear  NECK:  Supple without adenopathy or thyromegaly, no carotid bruits. Ear canals patent tympanic membrane intact, throat is clear, nostrils with no significant congestion, no significant tenderness over the maxillary sinuses bilaterally. LUNGS:  CTA all fields  HEART:  RRR without M, R, or G  ABDOMEN:  Soft and nontender without palpable abnormalities, No renal or aortic bruits. EXTREMITIES: No ankle edema bilaterally. Neuro: No focal deficit.     Lab Results   Component Value Date    WBC 8.4 11/09/2021    HGB 13.7 11/09/2021    HCT 42.4 11/09/2021     11/09/2021    CHOL 161 11/01/2021    TRIG 91 11/01/2021    HDL 48 11/01/2021    ALT 18 11/01/2021    AST 17 11/01/2021  11/01/2021    K 4.6 11/01/2021     11/01/2021    CREATININE 0.7 11/01/2021    BUN 14 11/01/2021    CO2 24 11/01/2021    TSH 0.850 11/01/2021    INR 1.0 11/07/2019    LABA1C 5.7 (H) 11/01/2021    LABMICR 13.3 11/01/2021        Impression/Plan:    1. Nasal congestion  -     guaiFENesin (MUCINEX) 600 MG extended release tablet; Take 1 tablet by mouth 2 times daily for 15 days, Disp-30 tablet, R-0Normal  -     fluticasone (FLONASE) 50 MCG/ACT nasal spray; 1 spray by Each Nostril route 2 times daily, Disp-32 g, R-3Normal  2. Cough  -     guaiFENesin (MUCINEX) 600 MG extended release tablet; Take 1 tablet by mouth 2 times daily for 15 days, Disp-30 tablet, R-0Normal  -     fluticasone (FLONASE) 50 MCG/ACT nasal spray; 1 spray by Each Nostril route 2 times daily, Disp-32 g, R-3Normal  3.  Otalgia, right ear

## 2021-12-16 ENCOUNTER — OFFICE VISIT (OUTPATIENT)
Dept: PRIMARY CARE CLINIC | Age: 41
End: 2021-12-16
Payer: COMMERCIAL

## 2021-12-16 VITALS
BODY MASS INDEX: 33.46 KG/M2 | HEART RATE: 86 BPM | DIASTOLIC BLOOD PRESSURE: 92 MMHG | OXYGEN SATURATION: 99 % | WEIGHT: 196 LBS | RESPIRATION RATE: 18 BRPM | SYSTOLIC BLOOD PRESSURE: 138 MMHG | HEIGHT: 64 IN | TEMPERATURE: 98.2 F

## 2021-12-16 DIAGNOSIS — R00.0 TACHYCARDIA: Primary | ICD-10-CM

## 2021-12-16 DIAGNOSIS — R09.81 NASAL CONGESTION: ICD-10-CM

## 2021-12-16 DIAGNOSIS — Z86.73 HISTORY OF CVA (CEREBROVASCULAR ACCIDENT) WITHOUT RESIDUAL DEFICITS: ICD-10-CM

## 2021-12-16 DIAGNOSIS — I10 UNCONTROLLED HYPERTENSION: ICD-10-CM

## 2021-12-16 PROCEDURE — 93000 ELECTROCARDIOGRAM COMPLETE: CPT | Performed by: INTERNAL MEDICINE

## 2021-12-16 PROCEDURE — 99214 OFFICE O/P EST MOD 30 MIN: CPT | Performed by: INTERNAL MEDICINE

## 2021-12-16 ASSESSMENT — ENCOUNTER SYMPTOMS
CHEST TIGHTNESS: 0
VOMITING: 0
VOICE CHANGE: 0
NAUSEA: 0
WHEEZING: 0
SORE THROAT: 0
ABDOMINAL PAIN: 0
SHORTNESS OF BREATH: 0

## 2021-12-16 NOTE — PROGRESS NOTES
HPI:  Patient comes in today for The patient is concerned about rapid pulse 100-114 today,she had no caffien today,only taking mucinex ,with no additives,but states she is very stuffy in the nose  Review of Systems   Constitutional: Negative for chills, fever and unexpected weight change. HENT: Positive for congestion. Negative for postnasal drip, sore throat and voice change. Respiratory: Negative for chest tightness, shortness of breath and wheezing. Cardiovascular: Positive for palpitations. Negative for chest pain and leg swelling. Tachycardia     Gastrointestinal: Negative for abdominal pain, nausea and vomiting. Musculoskeletal: Negative for gait problem and joint swelling. Skin: Negative for rash and wound. Neurological: Negative. Psychiatric/Behavioral: Negative.          Past Medical History:   Diagnosis Date    Cervicalgia     [facility]    Diverticular disease     Gastritis     Hyperlipidemia     Hypertension     Nontoxic multinodular goiter     Prediabetes     Unspecified cerebral artery occlusion with cerebral infarction     Vertigo     Vitamin B12 deficiency      Past Surgical History:   Procedure Laterality Date    ABDOMINOPLASTY  2011    SIGMOIDOSCOPY      UPPER GASTROINTESTINAL ENDOSCOPY       Family History   Problem Relation Age of Onset    High Cholesterol Mother     Parkinsonism Mother     High Blood Pressure Mother     High Blood Pressure Father     Diabetes Father       Social History     Tobacco Use    Smoking status: Never Smoker    Smokeless tobacco: Never Used   Vaping Use    Vaping Use: Never used   Substance Use Topics    Alcohol use: No    Drug use: No        Current Outpatient Medications on File Prior to Visit   Medication Sig Dispense Refill    NYAMYC 870167 UNIT/GM powder       metroNIDAZOLE (METROGEL) 0.75 % vaginal gel       ketoconazole (NIZORAL) 2 % cream       guaiFENesin (MUCINEX) 600 MG extended release tablet Take 1 tablet by mouth 2 times daily for 15 days 30 tablet 0    fluticasone (FLONASE) 50 MCG/ACT nasal spray 1 spray by Each Nostril route 2 times daily 32 g 3    fluconazole (DIFLUCAN) 100 MG tablet       Psyllium (METAMUCIL) WAFR Take 1 Wafer by mouth 2 times daily 60 Wafer 5    aspirin 81 MG EC tablet Take 1 tablet by mouth daily 90 tablet 0    Cholecalciferol (VITAMIN D3) 125 MCG (5000 UT) TABS Take 1 tablet by mouth daily 30 tablet 5    saccharomyces boulardii (FLORASTOR) 250 MG capsule Take 250 mg by mouth 2 times daily      omeprazole 20 MG EC tablet Take 1 tablet by mouth 2 times daily 180 tablet 3    lisinopril (PRINIVIL;ZESTRIL) 2.5 MG tablet take 1 tablet by mouth once daily      CRESTOR 20 MG tablet Take 1 tablet by mouth daily 90 tablet 3    sucralfate (CARAFATE) 1 GM tablet Take 1 tablet by mouth 4 times daily 120 tablet 5    FEROSUL 325 (65 Fe) MG tablet take 1 tablet by mouth every other day      Cyanocobalamin (VITAMIN B 12 PO) Take by mouth      acetaminophen (TYLENOL) 500 MG tablet Take 500 mg by mouth every 6 hours as needed for Pain (tid daily currently x 1 month)       No current facility-administered medications on file prior to visit. PE:  VS:  BP (!) 138/92 (Site: Right Upper Arm)   Pulse 86   Temp 98.2 °F (36.8 °C)   Resp 18   Ht 5' 4\" (1.626 m)   Wt 196 lb (88.9 kg)   SpO2 99%   BMI 33.64 kg/m²   General:  Alert and oriented, NAD  HEENT: Ear auricles are normal, EOMI, Conjunctivae clear  NECK:  Supple without adenopathy or thyromegaly, no carotid bruits. LUNGS:  CTA all fields  HEART:  RRR without M, R, or G  ABDOMEN:  Soft and nontender without palpable abnormalities, No renal or aortic bruits. EXTREMITIES: No ankle edema bilaterally. Neuro: No focal deficit.     Lab Results   Component Value Date    WBC 8.4 11/09/2021    HGB 13.7 11/09/2021    HCT 42.4 11/09/2021     11/09/2021    CHOL 161 11/01/2021    TRIG 91 11/01/2021    HDL 48 11/01/2021    ALT 18 11/01/2021    AST 17 11/01/2021     11/01/2021    K 4.6 11/01/2021     11/01/2021    CREATININE 0.7 11/01/2021    BUN 14 11/01/2021    CO2 24 11/01/2021    TSH 0.850 11/01/2021    INR 1.0 11/07/2019    LABA1C 5.7 (H) 11/01/2021    LABMICR 13.3 11/01/2021        Impression/Plan:    1. Tachycardia  Comments:  Aetiology not clear,?dehydration? advised to push fluids,she denies caffien intake or anxiety. ..will considr holter monitor if recurrent ,also FU with cardiology  Orders:  -     EKG 12 lead; Future  2. Uncontrolled hypertension  Comments: The pt. stopped Lisinopril,advised to resume meds and OK to hold one dose if RK<378 systolic,then resume the next day  3. Nasal congestion  Comments:  Pt. taking mucinex & flonase,no other OTC meds  4. History of CVA (cerebrovascular accident) without residual deficits  Comments:  No redidual physical defecit but the history causes the patient to get anxious easily. Ptis advised if any tachycardia,to relax and hydrate and call if persist   EKG reviewed and discussed with the pt. The pt. May require a B Blocker but she stated stated she was on it in the past and had to stop because it made her heart too slow.

## 2021-12-20 DIAGNOSIS — M54.17 LUMBOSACRAL RADICULOPATHY: ICD-10-CM

## 2021-12-21 DIAGNOSIS — R09.89 LABILE HYPERTENSION: ICD-10-CM

## 2021-12-21 DIAGNOSIS — R73.03 PREDIABETES: ICD-10-CM

## 2021-12-21 DIAGNOSIS — N39.0 FREQUENT UTI: ICD-10-CM

## 2021-12-21 DIAGNOSIS — E66.09 CLASS 1 OBESITY DUE TO EXCESS CALORIES WITH SERIOUS COMORBIDITY AND BODY MASS INDEX (BMI) OF 33.0 TO 33.9 IN ADULT: ICD-10-CM

## 2021-12-21 DIAGNOSIS — E88.810 METABOLIC SYNDROME: ICD-10-CM

## 2021-12-21 DIAGNOSIS — Z86.73 HISTORY OF CVA (CEREBROVASCULAR ACCIDENT): ICD-10-CM

## 2021-12-21 LAB
BACTERIA: ABNORMAL /HPF
BASOPHILS ABSOLUTE: 0.06 E9/L (ref 0–0.2)
BASOPHILS RELATIVE PERCENT: 0.7 % (ref 0–2)
BILIRUBIN URINE: NEGATIVE
BLOOD, URINE: NEGATIVE
CLARITY: CLEAR
COLOR: YELLOW
CREATININE URINE: 16 MG/DL (ref 29–226)
EOSINOPHILS ABSOLUTE: 0.17 E9/L (ref 0.05–0.5)
EOSINOPHILS RELATIVE PERCENT: 1.9 % (ref 0–6)
GLUCOSE URINE: NEGATIVE MG/DL
HCT VFR BLD CALC: 40.7 % (ref 34–48)
HEMOGLOBIN: 13.4 G/DL (ref 11.5–15.5)
IMMATURE GRANULOCYTES #: 0.03 E9/L
IMMATURE GRANULOCYTES %: 0.3 % (ref 0–5)
KETONES, URINE: NEGATIVE MG/DL
LEUKOCYTE ESTERASE, URINE: ABNORMAL
LYMPHOCYTES ABSOLUTE: 2.97 E9/L (ref 1.5–4)
LYMPHOCYTES RELATIVE PERCENT: 32.6 % (ref 20–42)
MCH RBC QN AUTO: 29.9 PG (ref 26–35)
MCHC RBC AUTO-ENTMCNC: 32.9 % (ref 32–34.5)
MCV RBC AUTO: 90.8 FL (ref 80–99.9)
MICROALBUMIN UR-MCNC: <12 MG/L
MICROALBUMIN/CREAT UR-RTO: ABNORMAL (ref 0–30)
MONOCYTES ABSOLUTE: 0.69 E9/L (ref 0.1–0.95)
MONOCYTES RELATIVE PERCENT: 7.6 % (ref 2–12)
NEUTROPHILS ABSOLUTE: 5.2 E9/L (ref 1.8–7.3)
NEUTROPHILS RELATIVE PERCENT: 56.9 % (ref 43–80)
NITRITE, URINE: NEGATIVE
PDW BLD-RTO: 12.7 FL (ref 11.5–15)
PH UA: 6 (ref 5–9)
PLATELET # BLD: 354 E9/L (ref 130–450)
PMV BLD AUTO: 9 FL (ref 7–12)
PROTEIN UA: NEGATIVE MG/DL
RBC # BLD: 4.48 E12/L (ref 3.5–5.5)
RBC UA: ABNORMAL /HPF (ref 0–2)
SPECIFIC GRAVITY UA: <=1.005 (ref 1–1.03)
UROBILINOGEN, URINE: 0.2 E.U./DL
WBC # BLD: 9.1 E9/L (ref 4.5–11.5)
WBC UA: ABNORMAL /HPF (ref 0–5)
YEAST: PRESENT /HPF

## 2021-12-22 DIAGNOSIS — R09.89 LABILE HYPERTENSION: ICD-10-CM

## 2021-12-22 DIAGNOSIS — E66.09 CLASS 1 OBESITY DUE TO EXCESS CALORIES WITH SERIOUS COMORBIDITY AND BODY MASS INDEX (BMI) OF 33.0 TO 33.9 IN ADULT: ICD-10-CM

## 2021-12-22 DIAGNOSIS — E88.810 METABOLIC SYNDROME: ICD-10-CM

## 2021-12-22 DIAGNOSIS — Z86.73 HISTORY OF CVA (CEREBROVASCULAR ACCIDENT): ICD-10-CM

## 2021-12-22 DIAGNOSIS — R73.03 PREDIABETES: ICD-10-CM

## 2021-12-22 LAB
ALBUMIN SERPL-MCNC: 4.2 G/DL (ref 3.5–5.2)
ALP BLD-CCNC: 75 U/L (ref 35–104)
ALT SERPL-CCNC: 16 U/L (ref 0–32)
ANION GAP SERPL CALCULATED.3IONS-SCNC: 16 MMOL/L (ref 7–16)
AST SERPL-CCNC: 16 U/L (ref 0–31)
BILIRUB SERPL-MCNC: <0.2 MG/DL (ref 0–1.2)
BUN BLDV-MCNC: 14 MG/DL (ref 6–20)
CALCIUM SERPL-MCNC: 9.7 MG/DL (ref 8.6–10.2)
CHLORIDE BLD-SCNC: 103 MMOL/L (ref 98–107)
CO2: 20 MMOL/L (ref 22–29)
CREAT SERPL-MCNC: 0.7 MG/DL (ref 0.5–1)
GFR AFRICAN AMERICAN: >60
GFR NON-AFRICAN AMERICAN: >60 ML/MIN/1.73
GLUCOSE BLD-MCNC: 123 MG/DL (ref 74–99)
POTASSIUM SERPL-SCNC: 4.1 MMOL/L (ref 3.5–5)
SODIUM BLD-SCNC: 139 MMOL/L (ref 132–146)
TOTAL PROTEIN: 7.4 G/DL (ref 6.4–8.3)
TSH SERPL DL<=0.05 MIU/L-ACNC: 0.57 UIU/ML (ref 0.27–4.2)

## 2021-12-23 ENCOUNTER — TELEPHONE (OUTPATIENT)
Dept: PRIMARY CARE CLINIC | Age: 41
End: 2021-12-23

## 2022-01-10 ENCOUNTER — PATIENT MESSAGE (OUTPATIENT)
Dept: PRIMARY CARE CLINIC | Age: 42
End: 2022-01-10

## 2022-01-10 DIAGNOSIS — K59.00 CONSTIPATION, UNSPECIFIED CONSTIPATION TYPE: ICD-10-CM

## 2022-01-10 DIAGNOSIS — R73.03 PREDIABETES: ICD-10-CM

## 2022-01-10 DIAGNOSIS — I10 UNCONTROLLED HYPERTENSION: Primary | ICD-10-CM

## 2022-01-10 DIAGNOSIS — R19.5 INCREASED MUCUS IN STOOL: ICD-10-CM

## 2022-01-11 ENCOUNTER — TELEPHONE (OUTPATIENT)
Dept: PRIMARY CARE CLINIC | Age: 42
End: 2022-01-11

## 2022-01-11 NOTE — TELEPHONE ENCOUNTER
From: April [facility]  To: Dr. Cinthya Crockett: 1/10/2022 9:01 AM EST  Subject: Diverticulitis     Good morning,   Im having left sided pain , some back pain and problems going to the bathroom for a few days ,now going normal. I do not want antibiotics for diverticulitis unless my cbc isnt normal. Can  put an order in for a cbc? I do not want to go to the er for this.  Thank you

## 2022-01-12 DIAGNOSIS — I10 UNCONTROLLED HYPERTENSION: ICD-10-CM

## 2022-01-12 DIAGNOSIS — R19.5 INCREASED MUCUS IN STOOL: ICD-10-CM

## 2022-01-12 DIAGNOSIS — K59.00 CONSTIPATION, UNSPECIFIED CONSTIPATION TYPE: ICD-10-CM

## 2022-01-12 LAB
ALBUMIN SERPL-MCNC: 4.7 G/DL (ref 3.5–5.2)
ALP BLD-CCNC: 75 U/L (ref 35–104)
ALT SERPL-CCNC: 19 U/L (ref 0–32)
ANION GAP SERPL CALCULATED.3IONS-SCNC: 14 MMOL/L (ref 7–16)
AST SERPL-CCNC: 20 U/L (ref 0–31)
BACTERIA: ABNORMAL /HPF
BASOPHILS ABSOLUTE: 0.05 E9/L (ref 0–0.2)
BASOPHILS RELATIVE PERCENT: 0.5 % (ref 0–2)
BILIRUB SERPL-MCNC: 0.2 MG/DL (ref 0–1.2)
BILIRUBIN URINE: NEGATIVE
BLOOD, URINE: NEGATIVE
BUN BLDV-MCNC: 12 MG/DL (ref 6–20)
CALCIUM SERPL-MCNC: 10.1 MG/DL (ref 8.6–10.2)
CHLORIDE BLD-SCNC: 99 MMOL/L (ref 98–107)
CLARITY: CLEAR
CO2: 24 MMOL/L (ref 22–29)
COLOR: YELLOW
CREAT SERPL-MCNC: 0.6 MG/DL (ref 0.5–1)
EOSINOPHILS ABSOLUTE: 0.17 E9/L (ref 0.05–0.5)
EOSINOPHILS RELATIVE PERCENT: 1.8 % (ref 0–6)
GFR AFRICAN AMERICAN: >60
GFR NON-AFRICAN AMERICAN: >60 ML/MIN/1.73
GLUCOSE BLD-MCNC: 116 MG/DL (ref 74–99)
GLUCOSE URINE: NEGATIVE MG/DL
HCT VFR BLD CALC: 41.9 % (ref 34–48)
HEMOGLOBIN: 13.8 G/DL (ref 11.5–15.5)
IMMATURE GRANULOCYTES #: 0.06 E9/L
IMMATURE GRANULOCYTES %: 0.6 % (ref 0–5)
KETONES, URINE: NEGATIVE MG/DL
LEUKOCYTE ESTERASE, URINE: ABNORMAL
LYMPHOCYTES ABSOLUTE: 2.25 E9/L (ref 1.5–4)
LYMPHOCYTES RELATIVE PERCENT: 24 % (ref 20–42)
MCH RBC QN AUTO: 29.4 PG (ref 26–35)
MCHC RBC AUTO-ENTMCNC: 32.9 % (ref 32–34.5)
MCV RBC AUTO: 89.1 FL (ref 80–99.9)
MONOCYTES ABSOLUTE: 0.8 E9/L (ref 0.1–0.95)
MONOCYTES RELATIVE PERCENT: 8.5 % (ref 2–12)
NEUTROPHILS ABSOLUTE: 6.03 E9/L (ref 1.8–7.3)
NEUTROPHILS RELATIVE PERCENT: 64.6 % (ref 43–80)
NITRITE, URINE: NEGATIVE
PDW BLD-RTO: 12.8 FL (ref 11.5–15)
PH UA: 6.5 (ref 5–9)
PLATELET # BLD: 382 E9/L (ref 130–450)
PMV BLD AUTO: 8.9 FL (ref 7–12)
POTASSIUM SERPL-SCNC: 4.3 MMOL/L (ref 3.5–5)
PROTEIN UA: NEGATIVE MG/DL
RBC # BLD: 4.7 E12/L (ref 3.5–5.5)
RBC UA: ABNORMAL /HPF (ref 0–2)
SODIUM BLD-SCNC: 137 MMOL/L (ref 132–146)
SPECIFIC GRAVITY UA: 1.01 (ref 1–1.03)
TOTAL PROTEIN: 8.2 G/DL (ref 6.4–8.3)
UROBILINOGEN, URINE: 0.2 E.U./DL
WBC # BLD: 9.4 E9/L (ref 4.5–11.5)
WBC UA: ABNORMAL /HPF (ref 0–5)
YEAST: PRESENT /HPF

## 2022-01-12 NOTE — TELEPHONE ENCOUNTER
Okay to order CBC with differential patient can do anywhere she wants. Also I can see her today unless she is feeling fine then we could see her next week.

## 2022-01-14 PROBLEM — R05.9 COUGH: Status: RESOLVED | Noted: 2021-12-15 | Resolved: 2022-01-14

## 2022-01-24 RX ORDER — LISINOPRIL 2.5 MG/1
TABLET ORAL
Qty: 90 TABLET | Refills: 0 | Status: SHIPPED
Start: 2022-01-24 | End: 2022-01-25 | Stop reason: SDUPTHER

## 2022-01-24 NOTE — TELEPHONE ENCOUNTER
Last Appointment:  12/16/2021  Future Appointments   Date Time Provider Chinmay Galeano   1/31/2022 10:00 AM MD Samuel Sosa 58

## 2022-01-25 RX ORDER — LISINOPRIL 2.5 MG/1
TABLET ORAL
Qty: 90 TABLET | Refills: 0 | Status: SHIPPED
Start: 2022-01-25 | End: 2022-07-05 | Stop reason: SDUPTHER

## 2022-01-25 NOTE — TELEPHONE ENCOUNTER
mj never went through yesterday for patients refill below, patient needs today due to going out of town today       lisinopril (PRINIVIL;ZESTRIL) 2.5 MG tablet

## 2022-02-21 ENCOUNTER — PATIENT MESSAGE (OUTPATIENT)
Dept: PRIMARY CARE CLINIC | Age: 42
End: 2022-02-21

## 2022-02-21 DIAGNOSIS — K21.9 GASTROESOPHAGEAL REFLUX DISEASE, UNSPECIFIED WHETHER ESOPHAGITIS PRESENT: ICD-10-CM

## 2022-02-21 DIAGNOSIS — R35.0 FREQUENCY OF MICTURITION: ICD-10-CM

## 2022-02-21 DIAGNOSIS — I10 UNCONTROLLED HYPERTENSION: Primary | ICD-10-CM

## 2022-02-21 DIAGNOSIS — I63.9 CRYPTOGENIC STROKE (HCC): ICD-10-CM

## 2022-02-21 NOTE — TELEPHONE ENCOUNTER
From: April [facility]  To: Dr. Pitt Guard: 2/21/2022 3:22 PM EST  Subject: Blood work     Good afternoon,   Bg Starr been treated with caspofungin for candida glabrata at the [address] [place] [address],[address]. I am coming home. Could  put orders in for my cbc, cmp, magnesium, and urine? . Id like to get my labs done this week when I get home .  Thank you

## 2022-02-28 DIAGNOSIS — M79.10 MYALGIA: ICD-10-CM

## 2022-02-28 DIAGNOSIS — R35.0 FREQUENCY OF MICTURITION: ICD-10-CM

## 2022-02-28 DIAGNOSIS — I10 ESSENTIAL HYPERTENSION: ICD-10-CM

## 2022-02-28 DIAGNOSIS — M25.532 PAIN OF BOTH WRIST JOINTS: ICD-10-CM

## 2022-02-28 DIAGNOSIS — M25.541 ARTHRALGIA OF BOTH HANDS: ICD-10-CM

## 2022-02-28 DIAGNOSIS — K21.9 GASTROESOPHAGEAL REFLUX DISEASE, UNSPECIFIED WHETHER ESOPHAGITIS PRESENT: ICD-10-CM

## 2022-02-28 DIAGNOSIS — M25.531 PAIN OF BOTH WRIST JOINTS: ICD-10-CM

## 2022-02-28 DIAGNOSIS — S40.022A CONTUSION OF LEFT UPPER EXTREMITY, INITIAL ENCOUNTER: ICD-10-CM

## 2022-02-28 DIAGNOSIS — M25.542 ARTHRALGIA OF BOTH HANDS: ICD-10-CM

## 2022-02-28 DIAGNOSIS — I63.9 CRYPTOGENIC STROKE (HCC): ICD-10-CM

## 2022-02-28 DIAGNOSIS — I10 UNCONTROLLED HYPERTENSION: ICD-10-CM

## 2022-02-28 LAB
ALBUMIN SERPL-MCNC: 4.3 G/DL (ref 3.5–5.2)
ALP BLD-CCNC: 88 U/L (ref 35–104)
ALT SERPL-CCNC: 17 U/L (ref 0–32)
ANION GAP SERPL CALCULATED.3IONS-SCNC: 15 MMOL/L (ref 7–16)
AST SERPL-CCNC: 19 U/L (ref 0–31)
BASOPHILS ABSOLUTE: 0.07 E9/L (ref 0–0.2)
BASOPHILS RELATIVE PERCENT: 0.9 % (ref 0–2)
BILIRUB SERPL-MCNC: 0.2 MG/DL (ref 0–1.2)
BUN BLDV-MCNC: 12 MG/DL (ref 6–20)
C-REACTIVE PROTEIN: 0.3 MG/DL (ref 0–0.4)
CALCIUM SERPL-MCNC: 9.5 MG/DL (ref 8.6–10.2)
CHLORIDE BLD-SCNC: 101 MMOL/L (ref 98–107)
CO2: 23 MMOL/L (ref 22–29)
CREAT SERPL-MCNC: 0.7 MG/DL (ref 0.5–1)
EOSINOPHILS ABSOLUTE: 0.22 E9/L (ref 0.05–0.5)
EOSINOPHILS RELATIVE PERCENT: 2.8 % (ref 0–6)
FERRITIN: 28 NG/ML
GFR AFRICAN AMERICAN: >60
GFR NON-AFRICAN AMERICAN: >60 ML/MIN/1.73
GLUCOSE BLD-MCNC: 101 MG/DL (ref 74–99)
HCT VFR BLD CALC: 37.2 % (ref 34–48)
HEMOGLOBIN: 12.2 G/DL (ref 11.5–15.5)
IMMATURE GRANULOCYTES #: 0.05 E9/L
IMMATURE GRANULOCYTES %: 0.6 % (ref 0–5)
LYMPHOCYTES ABSOLUTE: 2.33 E9/L (ref 1.5–4)
LYMPHOCYTES RELATIVE PERCENT: 29.6 % (ref 20–42)
MAGNESIUM: 2 MG/DL (ref 1.6–2.6)
MCH RBC QN AUTO: 28.8 PG (ref 26–35)
MCHC RBC AUTO-ENTMCNC: 32.8 % (ref 32–34.5)
MCV RBC AUTO: 87.7 FL (ref 80–99.9)
MONOCYTES ABSOLUTE: 0.73 E9/L (ref 0.1–0.95)
MONOCYTES RELATIVE PERCENT: 9.3 % (ref 2–12)
NEUTROPHILS ABSOLUTE: 4.47 E9/L (ref 1.8–7.3)
NEUTROPHILS RELATIVE PERCENT: 56.8 % (ref 43–80)
PDW BLD-RTO: 12.3 FL (ref 11.5–15)
PLATELET # BLD: 409 E9/L (ref 130–450)
PMV BLD AUTO: 8.9 FL (ref 7–12)
POTASSIUM SERPL-SCNC: 4 MMOL/L (ref 3.5–5)
RBC # BLD: 4.24 E12/L (ref 3.5–5.5)
SEDIMENTATION RATE, ERYTHROCYTE: 35 MM/HR (ref 0–20)
SODIUM BLD-SCNC: 139 MMOL/L (ref 132–146)
TOTAL PROTEIN: 7.6 G/DL (ref 6.4–8.3)
WBC # BLD: 7.9 E9/L (ref 4.5–11.5)

## 2022-03-01 ENCOUNTER — OFFICE VISIT (OUTPATIENT)
Dept: PRIMARY CARE CLINIC | Age: 42
End: 2022-03-01
Payer: COMMERCIAL

## 2022-03-01 VITALS
DIASTOLIC BLOOD PRESSURE: 96 MMHG | SYSTOLIC BLOOD PRESSURE: 122 MMHG | HEART RATE: 107 BPM | RESPIRATION RATE: 18 BRPM | HEIGHT: 64 IN | WEIGHT: 200 LBS | BODY MASS INDEX: 34.15 KG/M2 | TEMPERATURE: 97.3 F | OXYGEN SATURATION: 96 %

## 2022-03-01 DIAGNOSIS — R04.0 NASAL BLEEDING: ICD-10-CM

## 2022-03-01 DIAGNOSIS — R73.03 PREDIABETES: ICD-10-CM

## 2022-03-01 DIAGNOSIS — K21.9 GASTROESOPHAGEAL REFLUX DISEASE WITHOUT ESOPHAGITIS: ICD-10-CM

## 2022-03-01 DIAGNOSIS — R09.81 SINUS CONGESTION: ICD-10-CM

## 2022-03-01 DIAGNOSIS — Z86.73 HISTORY OF CVA (CEREBROVASCULAR ACCIDENT) WITHOUT RESIDUAL DEFICITS: ICD-10-CM

## 2022-03-01 DIAGNOSIS — I10 PRIMARY HYPERTENSION: ICD-10-CM

## 2022-03-01 DIAGNOSIS — N39.0 URINARY TRACT INFECTION WITHOUT HEMATURIA, SITE UNSPECIFIED: ICD-10-CM

## 2022-03-01 DIAGNOSIS — I10 UNCONTROLLED HYPERTENSION: ICD-10-CM

## 2022-03-01 PROCEDURE — 1111F DSCHRG MED/CURRENT MED MERGE: CPT | Performed by: INTERNAL MEDICINE

## 2022-03-01 PROCEDURE — 99214 OFFICE O/P EST MOD 30 MIN: CPT | Performed by: INTERNAL MEDICINE

## 2022-03-01 RX ORDER — AMOXICILLIN AND CLAVULANATE POTASSIUM 875; 125 MG/1; MG/1
1 TABLET, FILM COATED ORAL 2 TIMES DAILY
Qty: 14 TABLET | Refills: 0 | Status: SHIPPED | OUTPATIENT
Start: 2022-03-01 | End: 2022-03-08

## 2022-03-01 RX ORDER — ASPIRIN 81 MG/1
81 TABLET ORAL DAILY
Qty: 90 TABLET | Refills: 1 | Status: SHIPPED
Start: 2022-03-01 | End: 2022-09-06

## 2022-03-01 RX ORDER — SUCRALFATE 1 G/1
1 TABLET ORAL 4 TIMES DAILY
Qty: 120 TABLET | Refills: 5 | Status: SHIPPED | OUTPATIENT
Start: 2022-03-01

## 2022-03-01 RX ORDER — OMEPRAZOLE 20 MG/1
CAPSULE, DELAYED RELEASE ORAL
COMMUNITY
Start: 2022-01-16 | End: 2022-03-01

## 2022-03-01 RX ORDER — PANTOPRAZOLE SODIUM 20 MG/1
20 TABLET, DELAYED RELEASE ORAL
Qty: 30 TABLET | Refills: 0 | Status: SHIPPED
Start: 2022-03-01 | End: 2022-03-28

## 2022-03-01 NOTE — PROGRESS NOTES
Post-Discharge Transitional Care Management Progress Note      April [facility]   YOB: 1980    Date of Office Visit:  3/1/2022  Date of Hospital Admission:2/10/22  Date of Hospital Discharge: 2/12/22    Care management risk score Rising risk (score 2-5) and Complex Care (Scores >=6): 4     Non face to face  following discharge, date last encounter closed (first attempt may have been earlier): *No documented post hospital discharge outreach found in the last 14 days *No documented post hospital discharge outreach found in the last 14 days    Call initiated 2 business days of discharge: *No response recorded in the last 14 days    ASSESSMENT/PLAN:   Prediabetes  -     aspirin 81 MG EC tablet; Take 1 tablet by mouth daily, Disp-90 tablet, R-1Normal  Sinus congestion  Comments:  7 days of Augmentin patient to take regularly. Orders:  -     sucralfate (CARAFATE) 1 GM tablet; Take 1 tablet by mouth 4 times daily, Disp-120 tablet, R-5Normal  -     amoxicillin-clavulanate (AUGMENTIN) 875-125 MG per tablet; Take 1 tablet by mouth 2 times daily for 7 days, Disp-14 tablet, R-0Normal  -     CBC with Auto Differential; Future  Gastroesophageal reflux disease without esophagitis  Comments:  Patient is sensitive in the epigastric area requesting pantoprazole for a month then resuming Prilosec. Added Carafate. Orders:  -     pantoprazole (PROTONIX) 20 MG tablet; Take 1 tablet by mouth every morning (before breakfast), Disp-30 tablet, R-0Normal  History of CVA (cerebrovascular accident) without residual deficits  -     aspirin 81 MG EC tablet; Take 1 tablet by mouth daily, Disp-90 tablet, R-1Normal  Primary hypertension  Urinary tract infection without hematuria, site unspecified  Comments:  Colonization with resistant Candida glubrata, patient will continue follow-up virtual with [place] and if problems will need referral to infectious disease.   Orders:  -     CBC with Auto Differential; Future  Nasal bleeding      Medical Decision Making: moderate complexity  Return in about 4 weeks (around 3/29/2022). Subjective:   HPI:  Follow up of Hospital problems/diagnosis(es): Patient is complaining of nasal congestion and nasal bleeding. 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 Patient received infusions to treat Candida glabrata at [facility]. She will continue to follow with them virtually. Interval history/Current status: Sinus drainage and nasal bleeding as above also some discomfort in her stomach.     Patient Active Problem List   Diagnosis    Mixed hyperlipidemia    Migraine headache    History of exercise stress test    [facility]    Chest pain    Abnormal glucose level    Anxiety state    Disturbance in sleep behavior    Fatty liver    Hiatal hernia    Uncontrolled hypertension    Metabolic syndrome    Class 1 obesity due to excess calories with serious comorbidity and body mass index (BMI) of 33.0 to 33.9 in adult    Gastroesophageal reflux disease    Prediabetes    Heart palpitations    Myalgia    Frequency of micturition    Chills (without fever)    Bacteriuria    Fatigue    Dizziness    Hypotension    Cerebral infarction (HCC)    Cervical disc disorder with radiculopathy    Chronic gastritis without bleeding    Pain and swelling of left upper extremity    Pain and swelling of left knee    Contusion of upper limb    Epigastric pain    C. difficile colitis    Acute gastritis    Diarrhea of infectious origin    Cervical adenopathy    Thyroid nodule    Lower abdominal pain    Hypoglycemia    Kidney stone    Right flank pain    Dysuria    Thrush, oral    Pharyngitis    Fungal esophagitis    Diverticulitis of large intestine without perforation or abscess without bleeding    Esophagitis determined by endoscopy    Menorrhagia with regular cycle    Lumbosacral radiculopathy    Sacroiliitis (HCC)    Chronic bilateral low back pain with sciatica    Nasal congestion    Otalgia, right ear    Tachycardia    History of CVA (cerebrovascular accident) without residual deficits       Outpatient Medications Marked as Taking for the 3/1/22 encounter (Office Visit) with Roseanne Martínez MD   Medication Sig Dispense Refill    Ibrexafungerp Citrate 150 MG TABS Take 300 mg by mouth every 12 hours      aspirin 81 MG EC tablet Take 1 tablet by mouth daily 90 tablet 1    sucralfate (CARAFATE) 1 GM tablet Take 1 tablet by mouth 4 times daily 120 tablet 5    pantoprazole (PROTONIX) 20 MG tablet Take 1 tablet by mouth every morning (before breakfast) 30 tablet 0    amoxicillin-clavulanate (AUGMENTIN) 875-125 MG per tablet Take 1 tablet by mouth 2 times daily for 7 days 14 tablet 0    lisinopril (PRINIVIL;ZESTRIL) 2.5 MG tablet take 1 tablet by mouth once daily 90 tablet 0    fluticasone (FLONASE) 50 MCG/ACT nasal spray 1 spray by Each Nostril route 2 times daily 32 g 3    Cholecalciferol (VITAMIN D3) 125 MCG (5000 UT) TABS Take 1 tablet by mouth daily 30 tablet 5    omeprazole 20 MG EC tablet Take 1 tablet by mouth 2 times daily 180 tablet 3    CRESTOR 20 MG tablet Take 1 tablet by mouth daily 90 tablet 3    FEROSUL 325 (65 Fe) MG tablet take 1 tablet by mouth every other day      Cyanocobalamin (VITAMIN B 12 PO) Take by mouth      acetaminophen (TYLENOL) 500 MG tablet Take 500 mg by mouth every 6 hours as needed for Pain (tid daily currently x 1 month)          Medications patient taking as of now reconciled against medications ordered at time of hospital discharge: No        Objective:    BP (!) 122/96   Pulse 107   Temp 97.3 °F (36.3 °C)   Resp 18   Ht 5' 4\" (1.626 m)   Wt 200 lb (90.7 kg)   SpO2 96%   BMI 34.33 kg/m²   General Appearance: alert and oriented to person, place and time, well developed and well- nourished, in no acute distress  Skin: warm and dry, no rash or erythema  Head: normocephalic and atraumatic  Eyes: pupils equal, round, and reactive to light, extraocular eye movements intact, conjunctivae normal  Nose: Congestion in the nasal mucosa right worse than the left. Neck: supple and non-tender without mass, no thyromegaly or thyroid nodules, no cervical lymphadenopathy  Pulmonary/Chest: clear to auscultation bilaterally- no wheezes, rales or rhonchi, normal air movement, no respiratory distress  Cardiovascular: normal rate, regular rhythm, normal S1 and S2, no murmurs, rubs, clicks, or gallops, distal pulses intact, no carotid bruits  Abdomen: soft, discomfort in epigastric area with palpation. , non-distended, normal bowel sounds, no masses or organomegaly  Extremities: no cyanosis, clubbing or edema  Musculoskeletal: normal range of motion, no joint swelling, deformity or tenderness    An electronic signature was used to authenticate this note.   --Trena Calderon MD

## 2022-03-07 ENCOUNTER — TELEPHONE (OUTPATIENT)
Dept: PRIMARY CARE CLINIC | Age: 42
End: 2022-03-07

## 2022-03-07 DIAGNOSIS — M54.6 DORSALGIA OF THORACIC REGION: Primary | ICD-10-CM

## 2022-03-07 NOTE — TELEPHONE ENCOUNTER
Patient called in and is requesting a xray of her thoracic spine. She hurt her back and thinks it the same fracture as before. Please advise.

## 2022-03-09 ENCOUNTER — PATIENT MESSAGE (OUTPATIENT)
Dept: PRIMARY CARE CLINIC | Age: 42
End: 2022-03-09

## 2022-03-09 DIAGNOSIS — I10 UNCONTROLLED HYPERTENSION: Primary | ICD-10-CM

## 2022-03-09 DIAGNOSIS — E16.2 HYPOGLYCEMIA: ICD-10-CM

## 2022-03-24 DIAGNOSIS — I10 UNCONTROLLED HYPERTENSION: ICD-10-CM

## 2022-03-24 DIAGNOSIS — R73.03 PREDIABETES: ICD-10-CM

## 2022-03-24 DIAGNOSIS — E88.81 METABOLIC SYNDROME: ICD-10-CM

## 2022-03-24 DIAGNOSIS — R09.89 LABILE HYPERTENSION: ICD-10-CM

## 2022-03-24 DIAGNOSIS — Z86.73 HISTORY OF CVA (CEREBROVASCULAR ACCIDENT): ICD-10-CM

## 2022-03-24 DIAGNOSIS — E16.2 HYPOGLYCEMIA: ICD-10-CM

## 2022-03-24 DIAGNOSIS — E66.09 CLASS 1 OBESITY DUE TO EXCESS CALORIES WITH SERIOUS COMORBIDITY AND BODY MASS INDEX (BMI) OF 33.0 TO 33.9 IN ADULT: ICD-10-CM

## 2022-03-24 LAB
ALBUMIN SERPL-MCNC: 4.4 G/DL (ref 3.5–5.2)
ALP BLD-CCNC: 82 U/L (ref 35–104)
ALT SERPL-CCNC: 21 U/L (ref 0–32)
ANION GAP SERPL CALCULATED.3IONS-SCNC: 15 MMOL/L (ref 7–16)
AST SERPL-CCNC: 24 U/L (ref 0–31)
BASOPHILS ABSOLUTE: 0.06 E9/L (ref 0–0.2)
BASOPHILS RELATIVE PERCENT: 0.9 % (ref 0–2)
BILIRUB SERPL-MCNC: 0.2 MG/DL (ref 0–1.2)
BUN BLDV-MCNC: 14 MG/DL (ref 6–20)
CALCIUM SERPL-MCNC: 9.7 MG/DL (ref 8.6–10.2)
CHLORIDE BLD-SCNC: 101 MMOL/L (ref 98–107)
CHOLESTEROL, TOTAL: 203 MG/DL (ref 0–199)
CO2: 23 MMOL/L (ref 22–29)
CREAT SERPL-MCNC: 0.7 MG/DL (ref 0.5–1)
EOSINOPHILS ABSOLUTE: 0.18 E9/L (ref 0.05–0.5)
EOSINOPHILS RELATIVE PERCENT: 2.6 % (ref 0–6)
GFR AFRICAN AMERICAN: >60
GFR NON-AFRICAN AMERICAN: >60 ML/MIN/1.73
GLUCOSE BLD-MCNC: 124 MG/DL (ref 74–99)
HBA1C MFR BLD: 6.1 % (ref 4–5.6)
HCT VFR BLD CALC: 39.6 % (ref 34–48)
HDLC SERPL-MCNC: 51 MG/DL
HEMOGLOBIN: 12.9 G/DL (ref 11.5–15.5)
IMMATURE GRANULOCYTES #: 0.02 E9/L
IMMATURE GRANULOCYTES %: 0.3 % (ref 0–5)
LDL CHOLESTEROL CALCULATED: 88 MG/DL (ref 0–99)
LYMPHOCYTES ABSOLUTE: 1.96 E9/L (ref 1.5–4)
LYMPHOCYTES RELATIVE PERCENT: 28.7 % (ref 20–42)
MAGNESIUM: 2 MG/DL (ref 1.6–2.6)
MCH RBC QN AUTO: 28.5 PG (ref 26–35)
MCHC RBC AUTO-ENTMCNC: 32.6 % (ref 32–34.5)
MCV RBC AUTO: 87.6 FL (ref 80–99.9)
MONOCYTES ABSOLUTE: 0.51 E9/L (ref 0.1–0.95)
MONOCYTES RELATIVE PERCENT: 7.5 % (ref 2–12)
NEUTROPHILS ABSOLUTE: 4.11 E9/L (ref 1.8–7.3)
NEUTROPHILS RELATIVE PERCENT: 60 % (ref 43–80)
PDW BLD-RTO: 12.8 FL (ref 11.5–15)
PLATELET # BLD: 390 E9/L (ref 130–450)
PMV BLD AUTO: 8.9 FL (ref 7–12)
POTASSIUM SERPL-SCNC: 4.7 MMOL/L (ref 3.5–5)
RBC # BLD: 4.52 E12/L (ref 3.5–5.5)
SODIUM BLD-SCNC: 139 MMOL/L (ref 132–146)
TOTAL PROTEIN: 7.5 G/DL (ref 6.4–8.3)
TRIGL SERPL-MCNC: 320 MG/DL (ref 0–149)
VLDLC SERPL CALC-MCNC: 64 MG/DL
WBC # BLD: 6.8 E9/L (ref 4.5–11.5)

## 2022-03-25 DIAGNOSIS — K21.9 GASTROESOPHAGEAL REFLUX DISEASE WITHOUT ESOPHAGITIS: ICD-10-CM

## 2022-03-28 RX ORDER — PANTOPRAZOLE SODIUM 20 MG/1
TABLET, DELAYED RELEASE ORAL
Qty: 30 TABLET | Refills: 0 | Status: SHIPPED
Start: 2022-03-28 | End: 2022-05-02

## 2022-03-28 NOTE — TELEPHONE ENCOUNTER
Last Appointment:  3/1/2022  Future Appointments   Date Time Provider Chinmay Galeano   3/30/2022  9:00 AM MD Samuel Jacobs 58

## 2022-03-30 ENCOUNTER — TELEMEDICINE (OUTPATIENT)
Dept: PRIMARY CARE CLINIC | Age: 42
End: 2022-03-30
Payer: COMMERCIAL

## 2022-03-30 DIAGNOSIS — D64.9 ANEMIA, UNSPECIFIED TYPE: ICD-10-CM

## 2022-03-30 DIAGNOSIS — N39.0 FREQUENT UTI: ICD-10-CM

## 2022-03-30 DIAGNOSIS — N92.2 EXCESSIVE MENSTRUATION AT PUBERTY: ICD-10-CM

## 2022-03-30 DIAGNOSIS — N39.0 URINARY TRACT INFECTION WITHOUT HEMATURIA, SITE UNSPECIFIED: ICD-10-CM

## 2022-03-30 DIAGNOSIS — N92.4 EXCESSIVE BLEEDING IN PREMENOPAUSAL PERIOD: ICD-10-CM

## 2022-03-30 DIAGNOSIS — K57.32 DIVERTICULITIS OF LARGE INTESTINE WITHOUT PERFORATION OR ABSCESS WITHOUT BLEEDING: ICD-10-CM

## 2022-03-30 DIAGNOSIS — R73.03 PREDIABETES: Primary | ICD-10-CM

## 2022-03-30 DIAGNOSIS — E78.1 PURE HYPERTRIGLYCERIDEMIA: ICD-10-CM

## 2022-03-30 DIAGNOSIS — R09.81 SINUS CONGESTION: ICD-10-CM

## 2022-03-30 DIAGNOSIS — R30.0 DYSURIA: ICD-10-CM

## 2022-03-30 LAB
BACTERIA: ABNORMAL /HPF
BASOPHILS ABSOLUTE: 0.07 E9/L (ref 0–0.2)
BASOPHILS RELATIVE PERCENT: 0.7 % (ref 0–2)
BILIRUBIN URINE: NEGATIVE
BLOOD, URINE: ABNORMAL
CLARITY: CLEAR
COLOR: YELLOW
EOSINOPHILS ABSOLUTE: 0.15 E9/L (ref 0.05–0.5)
EOSINOPHILS RELATIVE PERCENT: 1.6 % (ref 0–6)
FERRITIN: 52 NG/ML
GLUCOSE URINE: NEGATIVE MG/DL
HCT VFR BLD CALC: 41 % (ref 34–48)
HEMOGLOBIN: 13.3 G/DL (ref 11.5–15.5)
IMMATURE GRANULOCYTES #: 0.02 E9/L
IMMATURE GRANULOCYTES %: 0.2 % (ref 0–5)
KETONES, URINE: NEGATIVE MG/DL
LEUKOCYTE ESTERASE, URINE: ABNORMAL
LYMPHOCYTES ABSOLUTE: 2.65 E9/L (ref 1.5–4)
LYMPHOCYTES RELATIVE PERCENT: 28 % (ref 20–42)
MCH RBC QN AUTO: 28.2 PG (ref 26–35)
MCHC RBC AUTO-ENTMCNC: 32.4 % (ref 32–34.5)
MCV RBC AUTO: 87 FL (ref 80–99.9)
MONOCYTES ABSOLUTE: 0.69 E9/L (ref 0.1–0.95)
MONOCYTES RELATIVE PERCENT: 7.3 % (ref 2–12)
NEUTROPHILS ABSOLUTE: 5.87 E9/L (ref 1.8–7.3)
NEUTROPHILS RELATIVE PERCENT: 62.2 % (ref 43–80)
NITRITE, URINE: NEGATIVE
PDW BLD-RTO: 13.1 FL (ref 11.5–15)
PH UA: 6 (ref 5–9)
PLATELET # BLD: 394 E9/L (ref 130–450)
PMV BLD AUTO: 8.9 FL (ref 7–12)
PROTEIN UA: NEGATIVE MG/DL
RBC # BLD: 4.71 E12/L (ref 3.5–5.5)
RBC UA: ABNORMAL /HPF (ref 0–2)
SPECIFIC GRAVITY UA: 1.01 (ref 1–1.03)
UROBILINOGEN, URINE: 0.2 E.U./DL
WBC # BLD: 9.5 E9/L (ref 4.5–11.5)
WBC UA: ABNORMAL /HPF (ref 0–5)
YEAST: PRESENT /HPF

## 2022-03-30 PROCEDURE — 99213 OFFICE O/P EST LOW 20 MIN: CPT | Performed by: INTERNAL MEDICINE

## 2022-03-30 RX ORDER — FERROUS SULFATE 325(65) MG
325 TABLET ORAL
Qty: 90 TABLET | Refills: 1 | Status: SHIPPED
Start: 2022-03-30 | End: 2022-05-02

## 2022-03-30 NOTE — PROGRESS NOTES
Iris Aragon (:  1980) is a Established patient, here for evaluation of the following:    Assessment & Plan   Below is the assessment and plan developed based on review of pertinent history, physical exam, labs, studies, and medications. 1. Prediabetes  Comments:  Elevated A1c and elevated fasting glucose patient counseled need to increase activity avoid free sugars consider medications. Orders:  -     Comprehensive Metabolic Panel; Future  2. Pure hypertriglyceridemia  Comments:  Patient admits she stopped taking Crestor advised to resume and avoid starches and free sugars  Orders:  -     Comprehensive Metabolic Panel; Future  -     Triglyceride; Future  3. Anemia, unspecified type  4. Excessive bleeding in premenopausal period  Comments:  Patient will follow with GYN, will require D&C, decision per GYN. 5. Dysuria  Comments:  Urinalysis ordered. 6. Diverticulitis of large intestine without perforation or abscess without bleeding    Return in about 2 months (around 2022). Subjective   HPI: Patient is requesting a virtual visit because she is not able to come to the office today. Patient is having excessive menstrual cycle she has seen her gynecologist and plans for [facility]  in the near future, patient also need to do a ferritin test ordered by her gynecologist.  Patient had recurrent abdominal pain was seen at [facility] and was told that she has diverticulitis recurrent at the same area in her colon and partial colectomy is recommended, patient will think about it. Patient also was given antibiotics due to the diverticulitis, she had contact with the ID specialist and needs to have a urine to evaluate for recurrence of Candida glabrata, also cultures done by GYN shows Candida glabrata in the vaginal swab.   Review of Systems       Objective   Patient-Reported Vitals  Patient-Reported Systolic (Top): 913 mmHg  Patient-Reported Diastolic (Bottom): 78 mmHg  BP Observations: No, remote/electronic monitoring device was not used or able to be verified  Patient-Reported Pulse: 98  Patient-Reported Temperature: 98.1  Patient-Reported Weight: 194lbs  Patient-Reported Height: 5' 4\"  Patient-Reported Pulse Oximetry: 97%       Physical Exam  [INSTRUCTIONS:  \"[x]\" Indicates a positive item  \"[]\" Indicates a negative item  -- DELETE ALL ITEMS NOT EXAMINED]    Constitutional: [x] Appears well-developed and well-nourished [x] No apparent distress      [] Abnormal -     Mental status: [x] Alert and awake  [x] Oriented to person/place/time [x] Able to follow commands    [] Abnormal -     Eyes:   EOM    [x]  Normal    [] Abnormal -   Sclera  [x]  Normal    [] Abnormal -          Discharge [x]  None visible   [] Abnormal -     HENT: [x] Normocephalic, atraumatic  [] Abnormal -   [x] Mouth/Throat: Mucous membranes are moist    External Ears [x] Normal  [] Abnormal -    Neck: [x] No visualized mass [] Abnormal -     Pulmonary/Chest: [x] Respiratory effort normal   [x] No visualized signs of difficulty breathing or respiratory distress        [] Abnormal -      Musculoskeletal:   [x] Normal gait with no signs of ataxia         [x] Normal range of motion of neck        [] Abnormal -     Neurological:        [x] No Facial Asymmetry (Cranial nerve 7 motor function) (limited exam due to video visit)          [x] No gaze palsy        [] Abnormal -          Skin:        [x] No significant exanthematous lesions or discoloration noted on facial skin         [] Abnormal -            Psychiatric:       [x] Normal Affect [] Abnormal -        [x] No Hallucinations    Other pertinent observable physical exam findings:-                 [facility], was evaluated through a synchronous (real-time) audio-video encounter. The patient (or guardian if applicable) is aware that this is a billable service, which includes applicable co-pays. This Virtual Visit was conducted with patient's (and/or legal guardian's) consent.  The visit was conducted pursuant to the emergency declaration under the [facility], [address] [place] [address] authority and the [employer] Act. Patient identification was verified, and a caregiver was present when appropriate. The patient was located at home in a state where the provider was licensed to provide care.        --Shereen Carrero MD

## 2022-03-31 ENCOUNTER — TELEPHONE (OUTPATIENT)
Dept: PRIMARY CARE CLINIC | Age: 42
End: 2022-03-31

## 2022-03-31 NOTE — TELEPHONE ENCOUNTER
Patient is advised there is no blood in the urine no indication to do a CAT scan. She states she has some flank pain, advised to schedule an appointment for evaluation of her flank pain.

## 2022-03-31 NOTE — TELEPHONE ENCOUNTER
Patient called to let  know she has blood in her urine and pt requesting a CT of abdomin and pelvis to check for kidney stones, [facility]     Fax [phone]

## 2022-04-19 ENCOUNTER — TELEMEDICINE (OUTPATIENT)
Dept: PRIMARY CARE CLINIC | Age: 42
End: 2022-04-19
Payer: COMMERCIAL

## 2022-04-19 DIAGNOSIS — N30.01 ACUTE CYSTITIS WITH HEMATURIA: Primary | ICD-10-CM

## 2022-04-19 DIAGNOSIS — B37.9 CANDIDIASIS: ICD-10-CM

## 2022-04-19 PROCEDURE — 99213 OFFICE O/P EST LOW 20 MIN: CPT | Performed by: STUDENT IN AN ORGANIZED HEALTH CARE EDUCATION/TRAINING PROGRAM

## 2022-04-19 RX ORDER — AMOXICILLIN AND CLAVULANATE POTASSIUM 875; 125 MG/1; MG/1
1 TABLET, FILM COATED ORAL 2 TIMES DAILY
Qty: 14 TABLET | Refills: 0 | Status: SHIPPED
Start: 2022-04-19 | End: 2022-04-26

## 2022-04-19 NOTE — PROGRESS NOTES
2022    TELEHEALTH EVALUATION -- Audio/Visual (During PRJSE-37 public health emergency)    HPI:    Iris Belcher (:  1980) has requested an audio/video evaluation for the following concern(s):    UTI  -did see urgent care [facility], found to have UTI, sent home on keflex  -has been on this for a day now, feeling no better  -states she usually responds to augmentin  -having frequency, back pain, urgency, and abdominal discomfort  -denies any fever or chills       Review of Systems - as above     Prior to Visit Medications    Medication Sig Taking?  Authorizing Provider   amoxicillin-clavulanate (AUGMENTIN) 875-125 MG per tablet Take 1 tablet by mouth 2 times daily for 7 days Yes Obdulia Alvarez DO   Ibrexafungerp Citrate 150 MG TABS Take 300 mg by mouth every 12 hours Yes Obdulia Alvarez DO   ferrous sulfate (IRON 325) 325 (65 Fe) MG tablet Take 1 tablet by mouth daily (with breakfast)  Luna Vang MD   pantoprazole (PROTONIX) 20 MG tablet take 1 tablet by mouth EVERY MORNING BEFORE BREAKFAST  Althea Ross MD   aspirin 81 MG EC tablet Take 1 tablet by mouth daily  Luna Vang MD   sucralfate (CARAFATE) 1 GM tablet Take 1 tablet by mouth 4 times daily  Althea Ross MD   lisinopril (PRINIVIL;ZESTRIL) 2.5 MG tablet take 1 tablet by mouth once daily  Althea Ross MD   fluticasone (FLONASE) 50 MCG/ACT nasal spray 1 spray by Each Nostril route 2 times daily  Althea Ross MD   Cholecalciferol (VITAMIN D3) 125 MCG (5000 UT) TABS Take 1 tablet by mouth daily  Luna Vang MD   omeprazole 20 MG EC tablet Take 1 tablet by mouth 2 times daily  Luna Vang MD   CRESTOR 20 MG tablet Take 1 tablet by mouth daily  Luna Vang MD   FEROSUL 325 (65 Fe) MG tablet take 1 tablet by mouth every other day  Historical Provider, MD   Cyanocobalamin (VITAMIN B 12 PO) Take by mouth  Historical Provider, MD   acetaminophen (TYLENOL) 500 MG tablet Take 500 mg by mouth every 6 hours as needed for Pain (tid daily currently x 1 month)  Historical Provider, MD       Social History     Tobacco Use    Smoking status: Never Smoker    Smokeless tobacco: Never Used   Vaping Use    Vaping Use: Never used   Substance Use Topics    Alcohol use: No    Drug use: No        PHYSICAL EXAMINATION:    Constitutional: [x] Appears well-developed and well-nourished [] No apparent distress      [] Abnormal-   Mental status  [x] Alert and awake  [] Oriented to person/place/time []Able to follow commands      Eyes:  EOM    [x]  Normal  [] Abnormal-  Sclera  []  Normal  [] Abnormal -         Discharge []  None visible  [] Abnormal -    HENT:   [x] Normocephalic, atraumatic. [] Abnormal   [] Mouth/Throat: Mucous membranes are moist.     External Ears [x] Normal  [] Abnormal-     Neck: [x] No visualized mass     Pulmonary/Chest: [x] Respiratory effort normal.  [x] No visualized signs of difficulty breathing or respiratory distress        [] Abnormal-      Musculoskeletal:   [] Normal gait with no signs of ataxia         [x] Normal range of motion of neck        [] Abnormal-       Neurological:        [x] No Facial Asymmetry (Cranial nerve 7 motor function) (limited exam to video visit)          [x] No gaze palsy        [] Abnormal-         Skin:        [x] No significant exanthematous lesions or discoloration noted on facial skin         [] Abnormal-            Psychiatric:       [x] Normal Affect [] No Hallucinations        [] Abnormal-     Other pertinent observable physical exam findings-     ASSESSMENT/PLAN:  1. Acute cystitis with hematuria  Urgent care f/u  -Still having issues, will switch to augmentin as patient feels like this has resolved her issues in the past, UA did grow yeast, will also treat for that however is resistant to diflucan   -Is following with urology and nephrology   -Advised to call if any fever or chills   - amoxicillin-clavulanate (AUGMENTIN) 875-125 MG per tablet;  Take 1 tablet by mouth 2 times daily for 7 days  Dispense: 14 tablet; Refill: 0    2. Candidiasis  As above   - Ibrexafungerp Citrate 150 MG TABS; Take 300 mg by mouth every 12 hours  Dispense: 4 tablet; Refill: 0        Return if symptoms worsen or fail to improve. Georges Shaw, was evaluated through a synchronous (real-time) audio-video encounter. The patient (or guardian if applicable) is aware that this is a billable service. Verbal consent to proceed has been obtained within the past 12 months. The visit was conducted pursuant to the emergency declaration under the [address] [place] [address], [address] [place] [address] authority and the [employer] General Act. Patient identification was verified, and a caregiver was present when appropriate. The patient was located in a state where the provider was credentialed to provide care. Total time spent on this encounter: Not billed by time    --Kizzy Sousa DO on 4/19/2022 at 1:22 PM    An electronic signature was used to authenticate this note.

## 2022-04-25 ENCOUNTER — TELEPHONE (OUTPATIENT)
Dept: PRIMARY CARE CLINIC | Age: 42
End: 2022-04-25

## 2022-04-25 NOTE — TELEPHONE ENCOUNTER
Patient called asking to either be seen tomorrow 4.26.2022 or have  put in orders for a CBC and urine culture    She is still experiencing the below     Frequent urination  Low back pain   Urgency to urinate

## 2022-04-26 ENCOUNTER — OFFICE VISIT (OUTPATIENT)
Dept: PRIMARY CARE CLINIC | Age: 42
End: 2022-04-26
Payer: COMMERCIAL

## 2022-04-26 VITALS
HEIGHT: 64 IN | TEMPERATURE: 96.8 F | BODY MASS INDEX: 34.83 KG/M2 | RESPIRATION RATE: 18 BRPM | OXYGEN SATURATION: 99 % | SYSTOLIC BLOOD PRESSURE: 138 MMHG | HEART RATE: 100 BPM | WEIGHT: 204 LBS | DIASTOLIC BLOOD PRESSURE: 98 MMHG

## 2022-04-26 DIAGNOSIS — F41.9 ANXIETY DISORDER, UNSPECIFIED TYPE: ICD-10-CM

## 2022-04-26 DIAGNOSIS — R30.0 DYSURIA: ICD-10-CM

## 2022-04-26 DIAGNOSIS — R73.03 PREDIABETES: ICD-10-CM

## 2022-04-26 DIAGNOSIS — R11.0 NAUSEA: ICD-10-CM

## 2022-04-26 DIAGNOSIS — I10 UNCONTROLLED HYPERTENSION: ICD-10-CM

## 2022-04-26 DIAGNOSIS — R42 DIZZINESS: Primary | ICD-10-CM

## 2022-04-26 DIAGNOSIS — D64.9 ANEMIA, UNSPECIFIED TYPE: ICD-10-CM

## 2022-04-26 DIAGNOSIS — K21.9 GASTROESOPHAGEAL REFLUX DISEASE WITHOUT ESOPHAGITIS: ICD-10-CM

## 2022-04-26 DIAGNOSIS — R42 DIZZINESS: ICD-10-CM

## 2022-04-26 LAB
ALBUMIN SERPL-MCNC: 4.5 G/DL (ref 3.5–5.2)
ALP BLD-CCNC: 74 U/L (ref 35–104)
ALT SERPL-CCNC: 24 U/L (ref 0–32)
ANION GAP SERPL CALCULATED.3IONS-SCNC: 20 MMOL/L (ref 7–16)
AST SERPL-CCNC: 24 U/L (ref 0–31)
BASOPHILS ABSOLUTE: 0.05 E9/L (ref 0–0.2)
BASOPHILS RELATIVE PERCENT: 0.9 % (ref 0–2)
BILIRUB SERPL-MCNC: 0.2 MG/DL (ref 0–1.2)
BILIRUBIN, POC: NORMAL
BLOOD URINE, POC: NORMAL
BUN BLDV-MCNC: 13 MG/DL (ref 6–20)
CALCIUM SERPL-MCNC: 10.1 MG/DL (ref 8.6–10.2)
CHLORIDE BLD-SCNC: 101 MMOL/L (ref 98–107)
CLARITY, POC: CLEAR
CO2: 20 MMOL/L (ref 22–29)
COLOR, POC: YELLOW
CREAT SERPL-MCNC: 0.6 MG/DL (ref 0.5–1)
EOSINOPHILS ABSOLUTE: 0.11 E9/L (ref 0.05–0.5)
EOSINOPHILS RELATIVE PERCENT: 2 % (ref 0–6)
GFR AFRICAN AMERICAN: >60
GFR NON-AFRICAN AMERICAN: >60 ML/MIN/1.73
GLUCOSE BLD-MCNC: 125 MG/DL (ref 74–99)
GLUCOSE URINE, POC: NORMAL
HCT VFR BLD CALC: 40.5 % (ref 34–48)
HEMOGLOBIN: 13 G/DL (ref 11.5–15.5)
IMMATURE GRANULOCYTES #: 0.03 E9/L
IMMATURE GRANULOCYTES %: 0.5 % (ref 0–5)
INFLUENZA A ANTIGEN, POC: NORMAL
INFLUENZA B ANTIGEN, POC: NORMAL
KETONES, POC: NORMAL
LEUKOCYTE EST, POC: NORMAL
LYMPHOCYTES ABSOLUTE: 1.61 E9/L (ref 1.5–4)
LYMPHOCYTES RELATIVE PERCENT: 28.9 % (ref 20–42)
Lab: NORMAL
MCH RBC QN AUTO: 28.3 PG (ref 26–35)
MCHC RBC AUTO-ENTMCNC: 32.1 % (ref 32–34.5)
MCV RBC AUTO: 88 FL (ref 80–99.9)
MONOCYTES ABSOLUTE: 0.42 E9/L (ref 0.1–0.95)
MONOCYTES RELATIVE PERCENT: 7.5 % (ref 2–12)
NEUTROPHILS ABSOLUTE: 3.35 E9/L (ref 1.8–7.3)
NEUTROPHILS RELATIVE PERCENT: 60.2 % (ref 43–80)
NITRITE, POC: NORMAL
PDW BLD-RTO: 13.2 FL (ref 11.5–15)
PERFORMING INSTRUMENT: NORMAL
PH, POC: 6
PLATELET # BLD: 342 E9/L (ref 130–450)
PMV BLD AUTO: 8.7 FL (ref 7–12)
POTASSIUM SERPL-SCNC: 4.5 MMOL/L (ref 3.5–5)
PROTEIN, POC: NORMAL
QC PASS/FAIL: NORMAL
RBC # BLD: 4.6 E12/L (ref 3.5–5.5)
SARS-COV-2, POC: NORMAL
SODIUM BLD-SCNC: 141 MMOL/L (ref 132–146)
SPECIFIC GRAVITY, POC: 1.02
TOTAL PROTEIN: 7.9 G/DL (ref 6.4–8.3)
UROBILINOGEN, POC: NORMAL
WBC # BLD: 5.6 E9/L (ref 4.5–11.5)

## 2022-04-26 PROCEDURE — 87426 SARSCOV CORONAVIRUS AG IA: CPT | Performed by: INTERNAL MEDICINE

## 2022-04-26 PROCEDURE — 81002 URINALYSIS NONAUTO W/O SCOPE: CPT | Performed by: INTERNAL MEDICINE

## 2022-04-26 PROCEDURE — 99214 OFFICE O/P EST MOD 30 MIN: CPT | Performed by: INTERNAL MEDICINE

## 2022-04-26 PROCEDURE — 87804 INFLUENZA ASSAY W/OPTIC: CPT | Performed by: INTERNAL MEDICINE

## 2022-04-26 ASSESSMENT — ENCOUNTER SYMPTOMS
WHEEZING: 0
EYES NEGATIVE: 1
VOICE CHANGE: 0
ABDOMINAL PAIN: 0
VOMITING: 0
NAUSEA: 1
BACK PAIN: 0
SORE THROAT: 0
SHORTNESS OF BREATH: 0
CHEST TIGHTNESS: 0

## 2022-04-26 NOTE — PROGRESS NOTES
HPI:  Patient comes in today for complaint of feeling very nauseated she feels that she is going to vomit patient also states that she is extremely lightheaded and she is having some chills and feeling extremely cold. Also states that she was very diaphoretic when she was awakened from her sleep today. Patient states that she has been having the symptoms for the past week since she had a urinary tract infection she was on antibiotics just finished treatment yesterday. She denies abdominal pain diarrhea or constipation, denies any congestion sore throat or cough. Patient was assisted to the examining table and helped to be in a flat position repeat vitals were taken and documented as well as orthostatics  Review of Systems   Constitutional: Positive for chills and diaphoresis. Negative for fever. HENT: Negative for congestion, mouth sores, postnasal drip, sore throat and voice change. Eyes: Negative. Respiratory: Negative for chest tightness, shortness of breath and wheezing. Cardiovascular: Negative for chest pain and leg swelling. Gastrointestinal: Positive for nausea. Negative for abdominal pain and vomiting. Genitourinary: Positive for dysuria and frequency. Negative for hematuria. Musculoskeletal: Negative for back pain, gait problem, joint swelling, myalgias, neck pain and neck stiffness. Skin: Negative for rash and wound. Neurological: Positive for weakness and light-headedness. Psychiatric/Behavioral: The patient is nervous/anxious.          Past Medical History:   Diagnosis Date    Cervicalgia     Cryptogenic stroke ([facility] [address] 11/7/2019    Diverticular disease     Gastritis     Hyperlipidemia     Hypertension     Nontoxic multinodular goiter     Prediabetes     Unspecified cerebral artery occlusion with cerebral infarction     Vertigo     Vitamin B12 deficiency      Past Surgical History:   Procedure Laterality Date    ABDOMINOPLASTY  2011    SIGMOIDOSCOPY      UPPER GASTROINTESTINAL ENDOSCOPY       Family History   Problem Relation Age of Onset    High Cholesterol Mother     Parkinsonism Mother     High Blood Pressure Mother     High Blood Pressure Father     Diabetes Father       Social History     Tobacco Use    Smoking status: Never Smoker    Smokeless tobacco: Never Used   Vaping Use    Vaping Use: Never used   Substance Use Topics    Alcohol use: No    Drug use: No        Current Outpatient Medications on File Prior to Visit   Medication Sig Dispense Refill    Ibrexafungerp Citrate 150 MG TABS Take 300 mg by mouth every 12 hours 4 tablet 0    ferrous sulfate (IRON 325) 325 (65 Fe) MG tablet Take 1 tablet by mouth daily (with breakfast) 90 tablet 1    pantoprazole (PROTONIX) 20 MG tablet take 1 tablet by mouth EVERY MORNING BEFORE BREAKFAST 30 tablet 0    aspirin 81 MG EC tablet Take 1 tablet by mouth daily 90 tablet 1    sucralfate (CARAFATE) 1 GM tablet Take 1 tablet by mouth 4 times daily 120 tablet 5    lisinopril (PRINIVIL;ZESTRIL) 2.5 MG tablet take 1 tablet by mouth once daily 90 tablet 0    fluticasone (FLONASE) 50 MCG/ACT nasal spray 1 spray by Each Nostril route 2 times daily 32 g 3    Cholecalciferol (VITAMIN D3) 125 MCG (5000 UT) TABS Take 1 tablet by mouth daily 30 tablet 5    omeprazole 20 MG EC tablet Take 1 tablet by mouth 2 times daily 180 tablet 3    CRESTOR 20 MG tablet Take 1 tablet by mouth daily 90 tablet 3    Cyanocobalamin (VITAMIN B 12 PO) Take by mouth      acetaminophen (TYLENOL) 500 MG tablet Take 500 mg by mouth every 6 hours as needed for Pain (tid daily currently x 1 month)       No current facility-administered medications on file prior to visit. PE:  VS:  BP (!) 138/98 (Position: Standing)   Pulse 100   Temp 96.8 °F (36 °C)   Resp 18   Ht 5' 4\" (1.626 m)   Wt 204 lb (92.5 kg)   SpO2 99%   BMI 35.02 kg/m²   General:  Alert and oriented, feeling weak and nauseated.   HEENT: Ear auricles are normal, EOMI, Conjunctivae clear  NECK:  Supple without adenopathy or thyromegaly, no carotid bruits. LUNGS:  CTA all fields, symmetrical expansion with no wheezes no rales   HEART:  RRR without M, R, or G  ABDOMEN:  Soft and mild discomfort epigastric area with palpation, without palpable masses. No renal or aortic bruits. EXTREMITIES: No ankle edema bilaterally. Neuro: No focal deficit. Lab Results   Component Value Date    WBC 5.6 04/26/2022    HGB 13.0 04/26/2022    HCT 40.5 04/26/2022     04/26/2022    CHOL 203 (H) 03/24/2022    TRIG 320 (H) 03/24/2022    HDL 51 03/24/2022    ALT 24 04/26/2022    AST 24 04/26/2022     04/26/2022    K 4.5 04/26/2022     04/26/2022    CREATININE 0.6 04/26/2022    BUN 13 04/26/2022    CO2 20 (L) 04/26/2022    TSH 0.572 12/22/2021    INR 1.0 11/07/2019    LABA1C 6.1 (H) 03/24/2022    LABMICR <12.0 12/21/2021        Impression/Plan:    1. Dizziness  Comments:  Etiology unclear but patient had nothing to drink or eat today, she felt somewhat better after she had some hydration with a lot of water. Orders:  -     CBC with Auto Differential; Future  -     POCT COVID-19, Antigen  -     Comprehensive Metabolic Panel; Future  -     Hemoglobin A1C; Future  -     Lipid Panel; Future  -     CBC with Auto Differential; Future  -     Microalbumin / Creatinine Urine Ratio; Future  -     TSH; Future  -     POCT Influenza A/B Antigen (BD Veritor)  -     Comprehensive Metabolic Panel; Future  2. Dysuria  Comments:  Urinalysis is clear, culture sent. Orders:  -     POCT Urinalysis no Micro  -     Culture, Urine; Future  -     CBC with Auto Differential; Future  3. Nausea  Comments:  History of GERD patient has some epigastric discomfort advised to double on the PPI for 2 weeks then reduce to once daily  Orders:  -     CBC with Auto Differential; Future  -     POCT COVID-19, Antigen  -     POCT Influenza A/B Antigen (BD Veritor)  -     Comprehensive Metabolic Panel; Future  4. Prediabetes  Comments:  Patient counseled regarding need for weight loss eliminate starches and sweets. Increase activity and double on water drinking. Orders:  -     Comprehensive Metabolic Panel; Future  -     Hemoglobin A1C; Future  -     Lipid Panel; Future  -     CBC with Auto Differential; Future  -     Microalbumin / Creatinine Urine Ratio; Future  -     TSH; Future  5. Anemia, unspecified type  -     Comprehensive Metabolic Panel; Future  -     Hemoglobin A1C; Future  -     Lipid Panel; Future  -     CBC with Auto Differential; Future  -     Microalbumin / Creatinine Urine Ratio; Future  -     TSH; Future  -     Comprehensive Metabolic Panel; Future  6. Gastroesophageal reflux disease without esophagitis  -     Comprehensive Metabolic Panel; Future  -     Hemoglobin A1C; Future  -     Lipid Panel; Future  -     CBC with Auto Differential; Future  -     Microalbumin / Creatinine Urine Ratio; Future  -     TSH; Future  7. Uncontrolled hypertension  Comments:  Patient indicates that she had better blood pressure at home and she became extremely anxious and pressure went up when she got the dizziness and nausea. Orders:  -     Comprehensive Metabolic Panel; Future  -     Hemoglobin A1C; Future  -     Lipid Panel; Future  -     CBC with Auto Differential; Future  -     Microalbumin / Creatinine Urine Ratio; Future  -     TSH; Future  -     Comprehensive Metabolic Panel; Future  8. Anxiety disorder, unspecified type  Patient advised feeling cold and chills and weakness could be related to a viral illness rapid COVID and influenza testing both were negative blood test obtained and patient advised to rest and continue to drink fluids and chicken broth electrolyte containing fluids as Powerade and report if her symptoms progress. Patient will call if not improved in the next 24 to 48 hours.

## 2022-04-28 ENCOUNTER — OFFICE VISIT (OUTPATIENT)
Dept: PRIMARY CARE CLINIC | Age: 42
End: 2022-04-28
Payer: COMMERCIAL

## 2022-04-28 VITALS
OXYGEN SATURATION: 100 % | TEMPERATURE: 96.6 F | HEART RATE: 94 BPM | RESPIRATION RATE: 20 BRPM | WEIGHT: 204 LBS | DIASTOLIC BLOOD PRESSURE: 110 MMHG | BODY MASS INDEX: 34.83 KG/M2 | HEIGHT: 64 IN | SYSTOLIC BLOOD PRESSURE: 156 MMHG

## 2022-04-28 DIAGNOSIS — R42 DIZZINESS: ICD-10-CM

## 2022-04-28 DIAGNOSIS — I10 UNCONTROLLED HYPERTENSION: ICD-10-CM

## 2022-04-28 DIAGNOSIS — E86.0 DEHYDRATION: ICD-10-CM

## 2022-04-28 DIAGNOSIS — F41.9 ANXIETY DISORDER, UNSPECIFIED TYPE: ICD-10-CM

## 2022-04-28 DIAGNOSIS — E86.0 DEHYDRATION: Primary | ICD-10-CM

## 2022-04-28 DIAGNOSIS — R51.9 NONINTRACTABLE HEADACHE, UNSPECIFIED CHRONICITY PATTERN, UNSPECIFIED HEADACHE TYPE: ICD-10-CM

## 2022-04-28 LAB — URINE CULTURE, ROUTINE: NORMAL

## 2022-04-28 PROCEDURE — 96360 HYDRATION IV INFUSION INIT: CPT | Performed by: INTERNAL MEDICINE

## 2022-04-28 PROCEDURE — 99214 OFFICE O/P EST MOD 30 MIN: CPT | Performed by: INTERNAL MEDICINE

## 2022-04-28 RX ORDER — 0.9 % SODIUM CHLORIDE 0.9 %
1000 INTRAVENOUS SOLUTION INTRAVENOUS ONCE
Status: CANCELLED | OUTPATIENT
Start: 2022-04-28 | End: 2022-04-28

## 2022-04-28 ASSESSMENT — ENCOUNTER SYMPTOMS
CHEST TIGHTNESS: 0
DIARRHEA: 1
NAUSEA: 0
WHEEZING: 0
VOICE CHANGE: 0
ABDOMINAL PAIN: 0
SORE THROAT: 0
VOMITING: 0
SHORTNESS OF BREATH: 0

## 2022-04-28 NOTE — PROGRESS NOTES
HPI:  Patient comes in today for feeling dizzy and headachy, patient states her blood pressure at home was 120/78 she always feels tachycardic and she feels nauseous and has diarrhea which she always gets with her treatment for the fungus that ibrexafungerp, patient took 2 tablets Tuesday night and 2 tablets Wednesday morning, usually has diarrhea for about 4 days  Review of Systems   Constitutional: Positive for chills and fatigue. Negative for fever and unexpected weight change. HENT: Negative for postnasal drip, sore throat and voice change. Respiratory: Negative for chest tightness, shortness of breath and wheezing. Cardiovascular: Negative for chest pain and leg swelling. Rapid heartbeat with tachycardia at times above 100 at rest but with ambulation 133 is her pulse rate according to her Fitbit   Gastrointestinal: Positive for diarrhea. Negative for abdominal pain, nausea and vomiting. Musculoskeletal: Negative for gait problem and joint swelling. Skin: Negative for rash and wound. Neurological: Positive for headaches. Psychiatric/Behavioral: Negative.          Past Medical History:   Diagnosis Date    Cervicalgia     Cryptogenic stroke ([facility] [address] 11/7/2019    Diverticular disease     Gastritis     Hyperlipidemia     Hypertension     Nontoxic multinodular goiter     Prediabetes     Unspecified cerebral artery occlusion with cerebral infarction     Vertigo     Vitamin B12 deficiency      Past Surgical History:   Procedure Laterality Date    ABDOMINOPLASTY  2011    SIGMOIDOSCOPY      UPPER GASTROINTESTINAL ENDOSCOPY       Family History   Problem Relation Age of Onset    High Cholesterol Mother     Parkinsonism Mother     High Blood Pressure Mother     High Blood Pressure Father     Diabetes Father       Social History     Tobacco Use    Smoking status: Never Smoker    Smokeless tobacco: Never Used   Vaping Use    Vaping Use: Never used   Substance Use Topics    Alcohol use: No    Drug use: No        Current Outpatient Medications on File Prior to Visit   Medication Sig Dispense Refill    Ibrexafungerp Citrate 150 MG TABS Take 300 mg by mouth every 12 hours 4 tablet 0    ferrous sulfate (IRON 325) 325 (65 Fe) MG tablet Take 1 tablet by mouth daily (with breakfast) 90 tablet 1    pantoprazole (PROTONIX) 20 MG tablet take 1 tablet by mouth EVERY MORNING BEFORE BREAKFAST 30 tablet 0    aspirin 81 MG EC tablet Take 1 tablet by mouth daily 90 tablet 1    sucralfate (CARAFATE) 1 GM tablet Take 1 tablet by mouth 4 times daily 120 tablet 5    lisinopril (PRINIVIL;ZESTRIL) 2.5 MG tablet take 1 tablet by mouth once daily 90 tablet 0    fluticasone (FLONASE) 50 MCG/ACT nasal spray 1 spray by Each Nostril route 2 times daily 32 g 3    Cholecalciferol (VITAMIN D3) 125 MCG (5000 UT) TABS Take 1 tablet by mouth daily 30 tablet 5    omeprazole 20 MG EC tablet Take 1 tablet by mouth 2 times daily 180 tablet 3    CRESTOR 20 MG tablet Take 1 tablet by mouth daily 90 tablet 3    Cyanocobalamin (VITAMIN B 12 PO) Take by mouth      acetaminophen (TYLENOL) 500 MG tablet Take 500 mg by mouth every 6 hours as needed for Pain (tid daily currently x 1 month)       No current facility-administered medications on file prior to visit. PE:  VS:  BP (!) 156/110   Pulse 94   Temp 96.6 °F (35.9 °C)   Resp 20   Ht 5' 4\" (1.626 m)   Wt 204 lb (92.5 kg)   SpO2 100%   BMI 35.02 kg/m²   General:  Alert and oriented, NAD  HEENT: Ear auricles are normal, EOMI, Conjunctivae clear  NECK:  Supple without adenopathy or thyromegaly, no carotid bruits. LUNGS:  CTA all fields  HEART:  RRR without M, R, or G  ABDOMEN:  Soft and nontender without palpable abnormalities, No renal or aortic bruits. EXTREMITIES: No ankle edema bilaterally. Neuro: No focal deficit.     Lab Results   Component Value Date    WBC 5.6 04/26/2022    HGB 13.0 04/26/2022    HCT 40.5 04/26/2022     04/26/2022    CHOL 203 (H) 03/24/2022    TRIG 320 (H) 03/24/2022    HDL 51 03/24/2022    ALT 24 04/26/2022    AST 24 04/26/2022     04/26/2022    K 4.5 04/26/2022     04/26/2022    CREATININE 0.6 04/26/2022    BUN 13 04/26/2022    CO2 20 (L) 04/26/2022    TSH 0.572 12/22/2021    INR 1.0 11/07/2019    LABA1C 6.1 (H) 03/24/2022    LABMICR <12.0 12/21/2021        Impression/Plan:    1. Dehydration  Comments: Will require some IV fluids, patient will report if diarrhea does not stop in 3 to 4 days. 2. Dizziness  Comments:  Patient has intractable diarrhea and is hypovolemic that is likely cause of her dizziness  3. Nonintractable headache, unspecified chronicity pattern, unspecified headache type  4. Anxiety disorder, unspecified type  Comments:  Patient states she does not feel particularly anxious but every time she goes to the bathroom with the diarrhea she gets dizzy and her heart races. 5. Uncontrolled hypertension  Comments:  Patient advised to increase lisinopril to 5 mg every morning, she did not take her 2.5 mg this morning yet. I have contacted the patient after she went home and she repeated her blood pressure at home was 128/85, patient advised to continue to monitor bring her readings to her next visit and her blood pressure machine.

## 2022-04-29 ENCOUNTER — HOSPITAL ENCOUNTER (OUTPATIENT)
Dept: INFUSION THERAPY | Age: 42
Setting detail: INFUSION SERIES
Discharge: HOME | End: 2022-04-29

## 2022-04-29 ENCOUNTER — TELEPHONE (OUTPATIENT)
Dept: ONCOLOGY | Age: 42
End: 2022-04-29

## 2022-04-29 NOTE — TELEPHONE ENCOUNTER
April called 4/29  Before 7:30 am. Left a message to cancel and requested a call back. I call her back to reschedule her for Monday 5/2 at 8:30 am. She informed me that she was on her way here for her 7:30 am appt on 4/29 and had a severe migraine. Needed to turn around to go home.

## 2022-05-02 ENCOUNTER — HOSPITAL ENCOUNTER (OUTPATIENT)
Dept: INFUSION THERAPY | Age: 42
Setting detail: INFUSION SERIES
Discharge: HOME | End: 2022-05-02
Payer: COMMERCIAL

## 2022-05-02 VITALS
OXYGEN SATURATION: 98 % | DIASTOLIC BLOOD PRESSURE: 78 MMHG | RESPIRATION RATE: 18 BRPM | SYSTOLIC BLOOD PRESSURE: 131 MMHG | TEMPERATURE: 97.4 F | HEART RATE: 89 BPM

## 2022-05-02 DIAGNOSIS — E86.0 DEHYDRATION: Primary | ICD-10-CM

## 2022-05-02 PROCEDURE — 96360 HYDRATION IV INFUSION INIT: CPT

## 2022-05-02 PROCEDURE — 96361 HYDRATE IV INFUSION ADD-ON: CPT

## 2022-05-02 PROCEDURE — 2580000003 HC RX 258: Performed by: INTERNAL MEDICINE

## 2022-05-02 RX ORDER — 0.9 % SODIUM CHLORIDE 0.9 %
1000 INTRAVENOUS SOLUTION INTRAVENOUS ONCE
Status: CANCELLED | OUTPATIENT
Start: 2022-05-02 | End: 2022-05-02

## 2022-05-02 RX ORDER — 0.9 % SODIUM CHLORIDE 0.9 %
1000 INTRAVENOUS SOLUTION INTRAVENOUS ONCE
Status: COMPLETED | OUTPATIENT
Start: 2022-05-02 | End: 2022-05-02

## 2022-05-02 RX ADMIN — SODIUM CHLORIDE 1000 ML: 9 INJECTION, SOLUTION INTRAVENOUS at 09:03

## 2022-05-02 NOTE — PROGRESS NOTES
Patient here for hydration therapy. Tolerated hydration well. After infusion verbalizes felt little better he headache that she had for the last 4 days eased a little . Reviewed therapy plan, offered education material and/or discharge material, reviewed medication information and signs and symptoms  and educated on possible side effects, verbalizes good knowledge of current plan patient verbalizes understanding, and has no signs or symptoms to report at this time. Patient discharged. Patient alert and oriented x3. No distress noted. Vital signs stable. Patient denies any new or worsening pain. Patient denies any needs. All questions answered. Next appointment scheduled. Declines copy of AVS. She was only ordered one treatment said she will touch base with her PCP. Patient had requested the infusion be stopped few minutes before it was complete.

## 2022-05-04 DIAGNOSIS — I10 UNCONTROLLED HYPERTENSION: ICD-10-CM

## 2022-05-04 DIAGNOSIS — R11.0 NAUSEA: ICD-10-CM

## 2022-05-04 DIAGNOSIS — R42 DIZZINESS: ICD-10-CM

## 2022-05-04 DIAGNOSIS — K21.9 GASTROESOPHAGEAL REFLUX DISEASE WITHOUT ESOPHAGITIS: ICD-10-CM

## 2022-05-04 DIAGNOSIS — D64.9 ANEMIA, UNSPECIFIED TYPE: ICD-10-CM

## 2022-05-04 DIAGNOSIS — R30.0 DYSURIA: ICD-10-CM

## 2022-05-04 DIAGNOSIS — R73.03 PREDIABETES: ICD-10-CM

## 2022-05-04 LAB
ALBUMIN SERPL-MCNC: 4.4 G/DL (ref 3.5–5.2)
ALP BLD-CCNC: 83 U/L (ref 35–104)
ALT SERPL-CCNC: 13 U/L (ref 0–32)
ANION GAP SERPL CALCULATED.3IONS-SCNC: 12 MMOL/L (ref 7–16)
AST SERPL-CCNC: 21 U/L (ref 0–31)
BASOPHILS ABSOLUTE: 0.07 E9/L (ref 0–0.2)
BASOPHILS RELATIVE PERCENT: 0.9 % (ref 0–2)
BILIRUB SERPL-MCNC: <0.2 MG/DL (ref 0–1.2)
BUN BLDV-MCNC: 14 MG/DL (ref 6–20)
CALCIUM SERPL-MCNC: 9.9 MG/DL (ref 8.6–10.2)
CHLORIDE BLD-SCNC: 105 MMOL/L (ref 98–107)
CO2: 23 MMOL/L (ref 22–29)
CREAT SERPL-MCNC: 0.5 MG/DL (ref 0.5–1)
EOSINOPHILS ABSOLUTE: 0.16 E9/L (ref 0.05–0.5)
EOSINOPHILS RELATIVE PERCENT: 2 % (ref 0–6)
GFR AFRICAN AMERICAN: >60
GFR NON-AFRICAN AMERICAN: >60 ML/MIN/1.73
GLUCOSE BLD-MCNC: 127 MG/DL (ref 74–99)
HCT VFR BLD CALC: 39.8 % (ref 34–48)
HEMOGLOBIN: 12.7 G/DL (ref 11.5–15.5)
IMMATURE GRANULOCYTES #: 0.04 E9/L
IMMATURE GRANULOCYTES %: 0.5 % (ref 0–5)
LYMPHOCYTES ABSOLUTE: 2.38 E9/L (ref 1.5–4)
LYMPHOCYTES RELATIVE PERCENT: 29.9 % (ref 20–42)
MCH RBC QN AUTO: 28.7 PG (ref 26–35)
MCHC RBC AUTO-ENTMCNC: 31.9 % (ref 32–34.5)
MCV RBC AUTO: 90 FL (ref 80–99.9)
MONOCYTES ABSOLUTE: 0.72 E9/L (ref 0.1–0.95)
MONOCYTES RELATIVE PERCENT: 9 % (ref 2–12)
NEUTROPHILS ABSOLUTE: 4.59 E9/L (ref 1.8–7.3)
NEUTROPHILS RELATIVE PERCENT: 57.7 % (ref 43–80)
PDW BLD-RTO: 13.3 FL (ref 11.5–15)
PLATELET # BLD: 332 E9/L (ref 130–450)
PMV BLD AUTO: 8.8 FL (ref 7–12)
POTASSIUM SERPL-SCNC: 4.8 MMOL/L (ref 3.5–5)
RBC # BLD: 4.42 E12/L (ref 3.5–5.5)
SODIUM BLD-SCNC: 140 MMOL/L (ref 132–146)
TOTAL PROTEIN: 7.6 G/DL (ref 6.4–8.3)
WBC # BLD: 8 E9/L (ref 4.5–11.5)

## 2022-05-12 ENCOUNTER — TELEMEDICINE (OUTPATIENT)
Dept: PRIMARY CARE CLINIC | Age: 42
End: 2022-05-12
Payer: COMMERCIAL

## 2022-05-12 DIAGNOSIS — R09.81 CONGESTION OF PARANASAL SINUS: ICD-10-CM

## 2022-05-12 DIAGNOSIS — J34.89 SINUS DRAINAGE: Primary | ICD-10-CM

## 2022-05-12 PROCEDURE — 99213 OFFICE O/P EST LOW 20 MIN: CPT | Performed by: INTERNAL MEDICINE

## 2022-05-12 RX ORDER — PREDNISONE 10 MG/1
TABLET ORAL
Qty: 20 TABLET | Refills: 0 | Status: SHIPPED
Start: 2022-05-12 | End: 2022-07-06 | Stop reason: ALTCHOICE

## 2022-05-12 NOTE — PROGRESS NOTES
Iris Aragon (:  1980) is a Established patient, here for evaluation of the following:    Assessment & Plan   Below is the assessment and plan developed based on review of pertinent history, physical exam, labs, studies, and medications. 1. Sinus drainage  -     predniSONE (DELTASONE) 10 MG tablet; 2 times/dayx4 days then once daily, Disp-20 tablet, R-0Normal  2. Congestion of paranasal sinus    No follow-ups on file. Subjective   HPI: Patient is requesting a visit because of feeling congested has sinus drainage. Review of Systems   Constitutional: Negative for chills, fever and unexpected weight change. HENT: Positive for congestion and postnasal drip. Negative for sore throat and voice change. Respiratory: Negative for chest tightness, shortness of breath and wheezing. Cardiovascular: Negative for chest pain and leg swelling. Gastrointestinal: Negative for abdominal pain, nausea and vomiting. Musculoskeletal: Negative for gait problem and joint swelling. Skin: Negative for rash and wound. Neurological: Negative. Psychiatric/Behavioral: Negative.            Objective   Patient-Reported Vitals  No data recorded     Physical Exam  [INSTRUCTIONS:  \"[x]\" Indicates a positive item  \"[]\" Indicates a negative item  -- DELETE ALL ITEMS NOT EXAMINED]    Constitutional: [x] Appears well-developed and well-nourished [x] No apparent distress      [] Abnormal -     Mental status: [x] Alert and awake  [x] Oriented to person/place/time [x] Able to follow commands    [] Abnormal -     Eyes:   EOM    [x]  Normal    [] Abnormal -   Sclera  [x]  Normal    [] Abnormal -          Discharge [x]  None visible   [] Abnormal -     HENT: [x] Normocephalic, atraumatic  [] Abnormal -   [x] Mouth/Throat: Mucous membranes are moist    External Ears [x] Normal  [] Abnormal -    Neck: [x] No visualized mass [] Abnormal -     Pulmonary/Chest: [x] Respiratory effort normal   [x] No visualized signs of difficulty breathing or respiratory distress        [] Abnormal -      Musculoskeletal:   [x] Normal gait with no signs of ataxia         [x] Normal range of motion of neck        [] Abnormal -     Neurological:        [x] No Facial Asymmetry (Cranial nerve 7 motor function) (limited exam due to video visit)          [x] No gaze palsy        [] Abnormal -          Skin:        [x] No significant exanthematous lesions or discoloration noted on facial skin         [] Abnormal -            Psychiatric:       [x] Normal Affect [] Abnormal -        [x] No Hallucinations    Other pertinent observable physical exam findings:-             Patient is requesting antibiotics she had trouble before with chronic fungus infections, patient counseled, will address congestion with steroids and patient to take Mucinex and drink fluids vitamin C and vitamin D. Patient agreeable medication sent to the pharmacy. Patient will call if not better. Adilson Lucio, was evaluated through a synchronous (real-time) audio-video encounter. The patient (or guardian if applicable) is aware that this is a billable service, which includes applicable co-pays. This Virtual Visit was conducted with patient's (and/or legal guardian's) consent. The visit was conducted pursuant to the emergency declaration under the [address] [place] [address] [facility] authority and the [employer] Act. Patient identification was verified, and a caregiver was present when appropriate. The patient was located at home in a state where the provider was licensed to provide care.        --Colt Fernandes MD

## 2022-05-16 ENCOUNTER — PATIENT MESSAGE (OUTPATIENT)
Dept: PRIMARY CARE CLINIC | Age: 42
End: 2022-05-16

## 2022-05-16 DIAGNOSIS — B37.9 CANDIDIASIS: ICD-10-CM

## 2022-05-16 DIAGNOSIS — N30.01 ACUTE CYSTITIS WITH HEMATURIA: ICD-10-CM

## 2022-05-16 NOTE — TELEPHONE ENCOUNTER
From: April [facility]  To: Dr. Sandra Nick: 5/16/2022 11:29 AM EDT  Subject: Sinus    Dr. Ho Bartholomew said to send a message Monday if I wasnt feeling better so she can call in an antibiotic and fungal pills. The steroid isnt doing enough. Can she please call in augmentin and ibrexafungerp to [facility] today. Please and thank you. The ibrexafungerp is 300 mg twice a day. So two pills in the morning and two at night. 150 mg per tablet two tablets in the morning two at night.

## 2022-05-19 RX ORDER — AMOXICILLIN AND CLAVULANATE POTASSIUM 875; 125 MG/1; MG/1
1 TABLET, FILM COATED ORAL 2 TIMES DAILY
Qty: 20 TABLET | Refills: 0 | Status: SHIPPED | OUTPATIENT
Start: 2022-05-19 | End: 2022-05-29

## 2022-05-28 PROBLEM — E86.0 DEHYDRATION: Status: RESOLVED | Noted: 2022-04-28 | Resolved: 2022-05-28

## 2022-05-28 ASSESSMENT — ENCOUNTER SYMPTOMS
SHORTNESS OF BREATH: 0
VOICE CHANGE: 0
WHEEZING: 0
SORE THROAT: 0
CHEST TIGHTNESS: 0
NAUSEA: 0
VOMITING: 0
ABDOMINAL PAIN: 0

## 2022-06-20 ENCOUNTER — PATIENT MESSAGE (OUTPATIENT)
Dept: PRIMARY CARE CLINIC | Age: 42
End: 2022-06-20

## 2022-06-20 DIAGNOSIS — E78.1 PURE HYPERTRIGLYCERIDEMIA: ICD-10-CM

## 2022-06-20 DIAGNOSIS — R53.83 FATIGUE, UNSPECIFIED TYPE: Primary | ICD-10-CM

## 2022-06-20 DIAGNOSIS — E16.2 HYPOGLYCEMIA: ICD-10-CM

## 2022-06-20 DIAGNOSIS — I10 PRIMARY HYPERTENSION: ICD-10-CM

## 2022-06-20 DIAGNOSIS — R42 DIZZINESS: ICD-10-CM

## 2022-06-20 DIAGNOSIS — R73.03 PREDIABETES: ICD-10-CM

## 2022-06-20 DIAGNOSIS — D64.9 ANEMIA, UNSPECIFIED TYPE: ICD-10-CM

## 2022-06-21 NOTE — TELEPHONE ENCOUNTER
From: April [facility]  To: Dr. Cade Board: 6/20/2022 11:37 AM EDT  Subject: Blood work     Good morning,   Can  please put orders in for my regular blood work? I have an appointment and its suppose to be done before my appointment. Thanks   Cbc   Cmp   Lipids  A1c   Ferritin   Iron (I dont care if insurance doesnt pay)   Tsh   T4   Vitamin d   Vitamin b   Magnesium.

## 2022-07-05 DIAGNOSIS — R53.83 FATIGUE, UNSPECIFIED TYPE: ICD-10-CM

## 2022-07-05 DIAGNOSIS — R73.03 PREDIABETES: ICD-10-CM

## 2022-07-05 DIAGNOSIS — D64.9 ANEMIA, UNSPECIFIED TYPE: ICD-10-CM

## 2022-07-05 DIAGNOSIS — R42 DIZZINESS: ICD-10-CM

## 2022-07-05 LAB
ALBUMIN SERPL-MCNC: 4.7 G/DL (ref 3.5–5.2)
ALP BLD-CCNC: 69 U/L (ref 35–104)
ALT SERPL-CCNC: 17 U/L (ref 0–32)
ANION GAP SERPL CALCULATED.3IONS-SCNC: 19 MMOL/L (ref 7–16)
AST SERPL-CCNC: 20 U/L (ref 0–31)
BASOPHILS ABSOLUTE: 0.04 E9/L (ref 0–0.2)
BASOPHILS RELATIVE PERCENT: 0.4 % (ref 0–2)
BILIRUB SERPL-MCNC: 0.3 MG/DL (ref 0–1.2)
BUN BLDV-MCNC: 10 MG/DL (ref 6–20)
CALCIUM SERPL-MCNC: 9.7 MG/DL (ref 8.6–10.2)
CHLORIDE BLD-SCNC: 103 MMOL/L (ref 98–107)
CO2: 16 MMOL/L (ref 22–29)
CREAT SERPL-MCNC: 0.6 MG/DL (ref 0.5–1)
EOSINOPHILS ABSOLUTE: 0.1 E9/L (ref 0.05–0.5)
EOSINOPHILS RELATIVE PERCENT: 1.1 % (ref 0–6)
FERRITIN: 42 NG/ML
GFR AFRICAN AMERICAN: >60
GFR NON-AFRICAN AMERICAN: >60 ML/MIN/1.73
GLUCOSE BLD-MCNC: 122 MG/DL (ref 74–99)
HCT VFR BLD CALC: 39.1 % (ref 34–48)
HEMOGLOBIN: 12.8 G/DL (ref 11.5–15.5)
IMMATURE GRANULOCYTES #: 0.03 E9/L
IMMATURE GRANULOCYTES %: 0.3 % (ref 0–5)
IRON SATURATION: 18 % (ref 15–50)
IRON: 73 MCG/DL (ref 37–145)
LYMPHOCYTES ABSOLUTE: 2.28 E9/L (ref 1.5–4)
LYMPHOCYTES RELATIVE PERCENT: 24.2 % (ref 20–42)
MCH RBC QN AUTO: 29.1 PG (ref 26–35)
MCHC RBC AUTO-ENTMCNC: 32.7 % (ref 32–34.5)
MCV RBC AUTO: 88.9 FL (ref 80–99.9)
MONOCYTES ABSOLUTE: 0.78 E9/L (ref 0.1–0.95)
MONOCYTES RELATIVE PERCENT: 8.3 % (ref 2–12)
NEUTROPHILS ABSOLUTE: 6.19 E9/L (ref 1.8–7.3)
NEUTROPHILS RELATIVE PERCENT: 65.7 % (ref 43–80)
PDW BLD-RTO: 13.1 FL (ref 11.5–15)
PLATELET # BLD: 346 E9/L (ref 130–450)
PMV BLD AUTO: 8.7 FL (ref 7–12)
POTASSIUM SERPL-SCNC: 4.1 MMOL/L (ref 3.5–5)
RBC # BLD: 4.4 E12/L (ref 3.5–5.5)
SODIUM BLD-SCNC: 138 MMOL/L (ref 132–146)
TOTAL IRON BINDING CAPACITY: 404 MCG/DL (ref 250–450)
TOTAL PROTEIN: 8 G/DL (ref 6.4–8.3)
TSH SERPL DL<=0.05 MIU/L-ACNC: 0.82 UIU/ML (ref 0.27–4.2)
WBC # BLD: 9.4 E9/L (ref 4.5–11.5)

## 2022-07-05 RX ORDER — LISINOPRIL 2.5 MG/1
TABLET ORAL
Qty: 90 TABLET | Refills: 0 | Status: SHIPPED | OUTPATIENT
Start: 2022-07-05

## 2022-07-06 ENCOUNTER — TELEMEDICINE (OUTPATIENT)
Dept: PRIMARY CARE CLINIC | Age: 42
End: 2022-07-06
Payer: COMMERCIAL

## 2022-07-06 ENCOUNTER — TELEPHONE (OUTPATIENT)
Dept: PRIMARY CARE CLINIC | Age: 42
End: 2022-07-06

## 2022-07-06 DIAGNOSIS — B37.9 CANDIDIASIS: ICD-10-CM

## 2022-07-06 DIAGNOSIS — I10 PRIMARY HYPERTENSION: ICD-10-CM

## 2022-07-06 DIAGNOSIS — E87.4 ACID-BASE IMBALANCE: ICD-10-CM

## 2022-07-06 DIAGNOSIS — R73.03 PREDIABETES: Primary | ICD-10-CM

## 2022-07-06 DIAGNOSIS — Z87.19 HX OF DIVERTICULITIS OF COLON: ICD-10-CM

## 2022-07-06 PROCEDURE — 99214 OFFICE O/P EST MOD 30 MIN: CPT | Performed by: INTERNAL MEDICINE

## 2022-07-06 RX ORDER — AMOXICILLIN AND CLAVULANATE POTASSIUM 875; 125 MG/1; MG/1
1 TABLET, FILM COATED ORAL 2 TIMES DAILY
COMMUNITY
End: 2022-07-06 | Stop reason: SDUPTHER

## 2022-07-06 RX ORDER — AMOXICILLIN AND CLAVULANATE POTASSIUM 875; 125 MG/1; MG/1
1 TABLET, FILM COATED ORAL 2 TIMES DAILY
Qty: 20 TABLET | Refills: 0 | Status: SHIPPED | OUTPATIENT
Start: 2022-07-06 | End: 2022-07-16

## 2022-07-06 RX ORDER — METRONIDAZOLE 500 MG/1
TABLET ORAL
COMMUNITY
Start: 2022-06-24 | End: 2022-07-06 | Stop reason: ALTCHOICE

## 2022-07-06 RX ORDER — CEFDINIR 300 MG/1
CAPSULE ORAL
COMMUNITY
Start: 2022-06-24 | End: 2022-07-06 | Stop reason: ALTCHOICE

## 2022-07-06 SDOH — ECONOMIC STABILITY: FOOD INSECURITY: WITHIN THE PAST 12 MONTHS, THE FOOD YOU BOUGHT JUST DIDN'T LAST AND YOU DIDN'T HAVE MONEY TO GET MORE.: NEVER TRUE

## 2022-07-06 SDOH — ECONOMIC STABILITY: FOOD INSECURITY: WITHIN THE PAST 12 MONTHS, YOU WORRIED THAT YOUR FOOD WOULD RUN OUT BEFORE YOU GOT MONEY TO BUY MORE.: NEVER TRUE

## 2022-07-06 ASSESSMENT — ENCOUNTER SYMPTOMS
BLOOD IN STOOL: 0
ABDOMINAL DISTENTION: 0
VOMITING: 0
CHEST TIGHTNESS: 0
SORE THROAT: 0
ABDOMINAL PAIN: 1
WHEEZING: 0
NAUSEA: 1
CONSTIPATION: 0
SHORTNESS OF BREATH: 0
VOICE CHANGE: 0
DIARRHEA: 0

## 2022-07-06 ASSESSMENT — PATIENT HEALTH QUESTIONNAIRE - PHQ9
SUM OF ALL RESPONSES TO PHQ9 QUESTIONS 1 & 2: 0
SUM OF ALL RESPONSES TO PHQ QUESTIONS 1-9: 0
2. FEELING DOWN, DEPRESSED OR HOPELESS: 0
SUM OF ALL RESPONSES TO PHQ QUESTIONS 1-9: 0
SUM OF ALL RESPONSES TO PHQ QUESTIONS 1-9: 0
1. LITTLE INTEREST OR PLEASURE IN DOING THINGS: 0
SUM OF ALL RESPONSES TO PHQ QUESTIONS 1-9: 0

## 2022-07-06 ASSESSMENT — SOCIAL DETERMINANTS OF HEALTH (SDOH): HOW HARD IS IT FOR YOU TO PAY FOR THE VERY BASICS LIKE FOOD, HOUSING, MEDICAL CARE, AND HEATING?: NOT HARD AT ALL

## 2022-07-06 NOTE — PROGRESS NOTES
Iris Aragon (:  1980) is a Established patient, here for evaluation of the following:    Assessment & Plan   Below is the assessment and plan developed based on review of pertinent history, physical exam, labs, studies, and medications. 1. Prediabetes  Comments:  Abnormal A1c and abnormal blood sugars, patient need to monitor avoid sweets decrease starches increase activity, fasting blood work ordered  Orders:  -     Hemoglobin A1C; Future  2. Candidiasis  -     Ibrexafungerp Citrate 150 MG TABS; Take 300 mg by mouth every 12 hours, Disp-4 tablet, R-0Normal  -     Comprehensive Metabolic Panel; Future  -     Hemoglobin A1C; Future  -     CBC with Auto Differential; Future  3. Primary hypertension  -     Comprehensive Metabolic Panel; Future  -     Hemoglobin A1C; Future  -     CBC with Auto Differential; Future  4. Hx of diverticulitis of colon  Comments:  Reports from [facility] reviewed patient is scheduled for partial colectomy  is, per her surgeon need to be on Augmentin till the surgery. 5. Acid-base imbalance  Comments:  Etiology unclear repeat ordered. No follow-ups on file. Subjective   HPI: Patient seen in [facility] with a recurrent diverticulitis, patient seen by the surgeon Dr. Oscar Hernandez, records reviewed and discussed with the patient. Patient scheduled for partial colectomy and according to records reviewed need to be on Augmentin through the surgery. Patient is scheduled to go to [facility] July 15. Patient had problems with candidiasis in the past and will require antifungal to follow the antibiotic if needed. Patient continues to have pain and discomfort in the left lower abdomen but denies any fever or chills no diarrhea. Has some nausea since she has been on the Flagyl. Patient states she stopped the Flagyl approximately a week ago and also was on Omnicef and she stopped that a week ago patient also has not been taking any prednisone.   Review of Systems Constitutional: Negative for chills, fever and unexpected weight change. HENT: Negative for postnasal drip, sore throat and voice change. Respiratory: Negative for chest tightness, shortness of breath and wheezing. Cardiovascular: Negative for chest pain and leg swelling. Gastrointestinal: Positive for abdominal pain and nausea. Negative for abdominal distention, blood in stool, constipation, diarrhea and vomiting. Genitourinary: Negative. Musculoskeletal: Negative for gait problem and joint swelling. Skin: Negative for rash and wound. Neurological: Negative. Negative for dizziness, speech difficulty and weakness. Hematological: Negative for adenopathy. Psychiatric/Behavioral: Negative.            Objective   Patient-Reported Vitals  No data recorded     Physical Exam  [INSTRUCTIONS:  \"[x]\" Indicates a positive item  \"[]\" Indicates a negative item  -- DELETE ALL ITEMS NOT EXAMINED]    Constitutional: [x] Appears well-developed and well-nourished [x] No apparent distress      [] Abnormal -     Mental status: [x] Alert and awake  [x] Oriented to person/place/time [x] Able to follow commands    [] Abnormal -     Eyes:   EOM    [x]  Normal    [] Abnormal -   Sclera  [x]  Normal    [] Abnormal -          Discharge [x]  None visible   [] Abnormal -     HENT: [x] Normocephalic, atraumatic  [] Abnormal -   [x] Mouth/Throat: Mucous membranes are moist    External Ears [x] Normal  [] Abnormal -    Neck: [x] No visualized mass [] Abnormal -     Pulmonary/Chest: [x] Respiratory effort normal   [x] No visualized signs of difficulty breathing or respiratory distress        [] Abnormal -      Musculoskeletal:   [x] Normal gait with no signs of ataxia         [x] Normal range of motion of neck        [] Abnormal -     Neurological:        [x] No Facial Asymmetry (Cranial nerve 7 motor function) (limited exam due to video visit)          [x] No gaze palsy        [] Abnormal -          Skin:        [x] No significant exanthematous lesions or discoloration noted on facial skin         [] Abnormal -            Psychiatric:       [x] Normal Affect [] Abnormal -        [x] No Hallucinations    Other pertinent observable physical exam findings:-             On this date 7/6/2022 I have spent 35 minutes reviewing previous notes, test results and face to face (virtual) with the patient discussing the diagnosis and importance of compliance with the treatment plan as well as documenting on the day of the visit. Norman Darby, was evaluated through a synchronous (real-time) audio-video encounter. The patient (or guardian if applicable) is aware that this is a billable service, which includes applicable co-pays. This Virtual Visit was conducted with patient's (and/or legal guardian's) consent. The visit was conducted pursuant to the emergency declaration under the [facility], [address] [place] [address] authority and the [employer] and [employer] General Act. Patient identification was verified, and a caregiver was present when appropriate. The patient was located at Home: [address] [address]. Provider was located at [facility] (Appt Dept): [address] [place] [address].         --Jeannie Camacho MD

## 2022-07-08 DIAGNOSIS — B37.9 CANDIDIASIS: ICD-10-CM

## 2022-07-08 DIAGNOSIS — I10 PRIMARY HYPERTENSION: ICD-10-CM

## 2022-07-08 DIAGNOSIS — R53.83 FATIGUE, UNSPECIFIED TYPE: ICD-10-CM

## 2022-07-08 DIAGNOSIS — R73.03 PREDIABETES: ICD-10-CM

## 2022-07-08 DIAGNOSIS — R42 DIZZINESS: ICD-10-CM

## 2022-07-08 DIAGNOSIS — D64.9 ANEMIA, UNSPECIFIED TYPE: ICD-10-CM

## 2022-07-08 LAB
ALBUMIN SERPL-MCNC: 4.6 G/DL (ref 3.5–5.2)
ALP BLD-CCNC: 72 U/L (ref 35–104)
ALT SERPL-CCNC: 17 U/L (ref 0–32)
ANION GAP SERPL CALCULATED.3IONS-SCNC: 15 MMOL/L (ref 7–16)
AST SERPL-CCNC: 20 U/L (ref 0–31)
BILIRUB SERPL-MCNC: 0.3 MG/DL (ref 0–1.2)
BUN BLDV-MCNC: 12 MG/DL (ref 6–20)
CALCIUM SERPL-MCNC: 9.7 MG/DL (ref 8.6–10.2)
CHLORIDE BLD-SCNC: 100 MMOL/L (ref 98–107)
CO2: 22 MMOL/L (ref 22–29)
CREAT SERPL-MCNC: 0.7 MG/DL (ref 0.5–1)
GFR AFRICAN AMERICAN: >60
GFR NON-AFRICAN AMERICAN: >60 ML/MIN/1.73
GLUCOSE BLD-MCNC: 163 MG/DL (ref 74–99)
MAGNESIUM: 1.8 MG/DL (ref 1.6–2.6)
POTASSIUM SERPL-SCNC: 4.1 MMOL/L (ref 3.5–5)
SODIUM BLD-SCNC: 137 MMOL/L (ref 132–146)
TOTAL PROTEIN: 8 G/DL (ref 6.4–8.3)
VITAMIN D 25-HYDROXY: 33 NG/ML (ref 30–100)

## 2022-07-13 DIAGNOSIS — B37.9 CANDIDIASIS: ICD-10-CM

## 2022-07-13 DIAGNOSIS — R73.03 PREDIABETES: ICD-10-CM

## 2022-07-13 DIAGNOSIS — E78.1 PURE HYPERTRIGLYCERIDEMIA: ICD-10-CM

## 2022-07-13 DIAGNOSIS — R06.00 DYSPNEA, UNSPECIFIED TYPE: ICD-10-CM

## 2022-07-13 DIAGNOSIS — I10 PRIMARY HYPERTENSION: ICD-10-CM

## 2022-07-13 LAB
ALBUMIN SERPL-MCNC: 4.6 G/DL (ref 3.5–5.2)
ALP BLD-CCNC: 80 U/L (ref 35–104)
ALT SERPL-CCNC: 19 U/L (ref 0–32)
ANION GAP SERPL CALCULATED.3IONS-SCNC: 16 MMOL/L (ref 7–16)
AST SERPL-CCNC: 22 U/L (ref 0–31)
BASOPHILS ABSOLUTE: 0.04 E9/L (ref 0–0.2)
BASOPHILS RELATIVE PERCENT: 0.5 % (ref 0–2)
BILIRUB SERPL-MCNC: <0.2 MG/DL (ref 0–1.2)
BUN BLDV-MCNC: 11 MG/DL (ref 6–20)
CALCIUM SERPL-MCNC: 9.8 MG/DL (ref 8.6–10.2)
CHLORIDE BLD-SCNC: 101 MMOL/L (ref 98–107)
CO2: 23 MMOL/L (ref 22–29)
CREAT SERPL-MCNC: 0.7 MG/DL (ref 0.5–1)
D DIMER: 250 NG/ML DDU
EOSINOPHILS ABSOLUTE: 0.17 E9/L (ref 0.05–0.5)
EOSINOPHILS RELATIVE PERCENT: 2 % (ref 0–6)
GFR AFRICAN AMERICAN: >60
GFR NON-AFRICAN AMERICAN: >60 ML/MIN/1.73
GLUCOSE BLD-MCNC: 93 MG/DL (ref 74–99)
HCT VFR BLD CALC: 39 % (ref 34–48)
HEMOGLOBIN: 13.1 G/DL (ref 11.5–15.5)
IMMATURE GRANULOCYTES #: 0.02 E9/L
IMMATURE GRANULOCYTES %: 0.2 % (ref 0–5)
LYMPHOCYTES ABSOLUTE: 3.04 E9/L (ref 1.5–4)
LYMPHOCYTES RELATIVE PERCENT: 35 % (ref 20–42)
MCH RBC QN AUTO: 29.6 PG (ref 26–35)
MCHC RBC AUTO-ENTMCNC: 33.6 % (ref 32–34.5)
MCV RBC AUTO: 88.2 FL (ref 80–99.9)
MONOCYTES ABSOLUTE: 0.75 E9/L (ref 0.1–0.95)
MONOCYTES RELATIVE PERCENT: 8.6 % (ref 2–12)
NEUTROPHILS ABSOLUTE: 4.67 E9/L (ref 1.8–7.3)
NEUTROPHILS RELATIVE PERCENT: 53.7 % (ref 43–80)
PDW BLD-RTO: 12.8 FL (ref 11.5–15)
PLATELET # BLD: 382 E9/L (ref 130–450)
PMV BLD AUTO: 8.9 FL (ref 7–12)
POTASSIUM SERPL-SCNC: 3.9 MMOL/L (ref 3.5–5)
RBC # BLD: 4.42 E12/L (ref 3.5–5.5)
SODIUM BLD-SCNC: 140 MMOL/L (ref 132–146)
TOTAL PROTEIN: 7.8 G/DL (ref 6.4–8.3)
WBC # BLD: 8.7 E9/L (ref 4.5–11.5)

## 2022-07-27 DIAGNOSIS — E78.2 MIXED HYPERLIPIDEMIA: ICD-10-CM

## 2022-07-27 DIAGNOSIS — R10.30 LOWER ABDOMINAL PAIN: ICD-10-CM

## 2022-07-27 NOTE — TELEPHONE ENCOUNTER
Last Appointment:  7/6/2022  Future Appointments   Date Time Provider Chinmay Galeano   8/3/2022  9:00 AM MD Samuel Guo 58

## 2022-08-01 RX ORDER — ROSUVASTATIN CALCIUM 20 MG/1
TABLET, COATED ORAL
Qty: 90 TABLET | Refills: 3 | Status: SHIPPED
Start: 2022-08-01 | End: 2022-08-08 | Stop reason: SDUPTHER

## 2022-08-05 ENCOUNTER — TELEPHONE (OUTPATIENT)
Dept: PRIMARY CARE CLINIC | Age: 42
End: 2022-08-05

## 2022-08-05 DIAGNOSIS — E78.2 MIXED HYPERLIPIDEMIA: ICD-10-CM

## 2022-08-08 RX ORDER — ROSUVASTATIN CALCIUM 20 MG/1
TABLET, COATED ORAL
Qty: 90 TABLET | Refills: 0 | Status: SHIPPED | OUTPATIENT
Start: 2022-08-08

## 2022-08-11 ENCOUNTER — CARE COORDINATION (OUTPATIENT)
Dept: CARE COORDINATION | Age: 42
End: 2022-08-11

## 2022-08-11 NOTE — CARE COORDINATION
Ambulatory Care Management Note    Date/Time:  8/11/2022 9:38 AM    This patient was received as a referral from  Payer . Ambulatory Care Manager outreached to patient today to offer care management services. Introduction to self and role of care manager provided. Patient declined care management services at this time. No follow up call scheduled at this time. Patient has Ambulatory Care Manager's contact number for for any questions or concerns. ACM offered to assist patient in scheduling a f/u appointment with PCP. Patient declined need for assistance.      PLAN  -ACM will remove name from the care team and place patient in temporary exclusion from Care Coordination d/t declined program.

## 2022-08-31 ENCOUNTER — TELEMEDICINE (OUTPATIENT)
Dept: PRIMARY CARE CLINIC | Age: 42
End: 2022-08-31
Payer: COMMERCIAL

## 2022-08-31 DIAGNOSIS — Z90.49 S/P PARTIAL COLECTOMY: ICD-10-CM

## 2022-08-31 DIAGNOSIS — B37.0 ORAL THRUSH: ICD-10-CM

## 2022-08-31 DIAGNOSIS — A04.72 C. DIFFICILE COLITIS: Primary | ICD-10-CM

## 2022-08-31 DIAGNOSIS — I10 PRIMARY HYPERTENSION: ICD-10-CM

## 2022-08-31 DIAGNOSIS — B37.9 CANDIDIASIS: ICD-10-CM

## 2022-08-31 PROCEDURE — 99214 OFFICE O/P EST MOD 30 MIN: CPT | Performed by: INTERNAL MEDICINE

## 2022-08-31 RX ORDER — VANCOMYCIN HYDROCHLORIDE 125 MG/1
CAPSULE ORAL
COMMUNITY
Start: 2022-08-18 | End: 2022-10-09

## 2022-08-31 ASSESSMENT — ENCOUNTER SYMPTOMS
VOMITING: 0
SORE THROAT: 0
VOICE CHANGE: 0
SHORTNESS OF BREATH: 0
CHEST TIGHTNESS: 0
NAUSEA: 1
WHEEZING: 0
ABDOMINAL PAIN: 1

## 2022-08-31 NOTE — PROGRESS NOTES
Iris Aragon (:  1980) is a Established patient, here for evaluation of the following:    Assessment & Plan   Below is the assessment and plan developed based on review of pertinent history, physical exam, labs, studies, and medications. 1. C. difficile colitis  2. Oral thrush  -     nystatin (MYCOSTATIN) 846666 UNIT/ML suspension; Take 5 mLs by mouth 4 times daily for 10 days Retain in mouth as long as possible, swish and spit., Oral, 4 TIMES DAILY Starting Wed 2022, Until Sat 9/10/2022, For 10 days, Disp-200 mL, R-0, Adjust Sig  3. Primary hypertension  -     CBC with Auto Differential; Future  -     Comprehensive Metabolic Panel; Future  4. S/P partial colectomy  5. Candidiasis  Return in about 3 weeks (around 2022). Subjective   [facility]: [facility] she had partial large bowel resection,, she had 27 inches removed due to multiple diverticulosis and multiple diverticulitis. Patient after that had an abscess related to the surgical site and this was drained. She had urinary tract infections and required multiple antibiotics and this was followed by recurrence of the fungal infection she received IV antifungal medication for 7 days and ended with C. difficile exotoxin infection currently she is on vancomycin p.o. need to be on it for a total of 51 days. Her diarrhea has improved tremendously she is tolerating the medication. Review of Systems   Constitutional:  Negative for chills, fever and unexpected weight change. HENT:  Negative for postnasal drip, sore throat and voice change. Respiratory:  Negative for chest tightness, shortness of breath and wheezing. Cardiovascular:  Negative for chest pain and leg swelling. Gastrointestinal:  Positive for abdominal pain (Mild residual abdominal pain related to the surgery.) and nausea. Negative for vomiting. Genitourinary:  Positive for frequency.    Musculoskeletal:  Negative for gait problem and joint swelling. Skin:  Negative for rash and wound. Neurological: Negative. Psychiatric/Behavioral: Negative. Objective   Patient-Reported Vitals  Patient-Reported Weight: 204  Patient-Reported Height: 5' 4\"       Physical Exam  [INSTRUCTIONS:  \"[x]\" Indicates a positive item  \"[]\" Indicates a negative item  -- DELETE ALL ITEMS NOT EXAMINED]    Constitutional: [x] Appears well-developed and well-nourished [x] No apparent distress      [] Abnormal -     Mental status: [x] Alert and awake  [x] Oriented to person/place/time [x] Able to follow commands    [] Abnormal -     Eyes:   EOM    [x]  Normal    [] Abnormal -   Sclera  [x]  Normal    [] Abnormal -          Discharge [x]  None visible   [] Abnormal -     HENT: [x] Normocephalic, atraumatic  [] Abnormal -   [x] Mouth/Throat: Mucous membranes are moist    External Ears [x] Normal  [] Abnormal -    Neck: [x] No visualized mass [] Abnormal -     Pulmonary/Chest: [x] Respiratory effort normal   [x] No visualized signs of difficulty breathing or respiratory distress        [] Abnormal -      Musculoskeletal:   [x] Normal gait with no signs of ataxia         [x] Normal range of motion of neck        [] Abnormal -     Neurological:        [x] No Facial Asymmetry (Cranial nerve 7 motor function) (limited exam due to video visit)          [x] No gaze palsy        [] Abnormal -          Skin:        [x] No significant exanthematous lesions or discoloration noted on facial skin         [] Abnormal -            Psychiatric:       [x] Normal Affect [] Abnormal -        [x] No Hallucinations    Other pertinent observable physical exam findings:-       Reviewed records from [facility], was evaluated through a synchronous (real-time) audio-video encounter. The patient (or guardian if applicable) is aware that this is a billable service, which includes applicable co-pays.  This Virtual Visit was conducted with patient's (and/or legal guardian's) consent. The visit was conducted pursuant to the emergency declaration under the [facility], [facility] authority and the [employer] General Act. Patient identification was verified, and a caregiver was present when appropriate. The patient was located at Home: [address] [facility] [address]. Provider was located at [facility] (Appt Dept): [address] [place] [address].         --Sondra Hung MD

## 2022-09-03 DIAGNOSIS — Z86.73 HISTORY OF CVA (CEREBROVASCULAR ACCIDENT) WITHOUT RESIDUAL DEFICITS: ICD-10-CM

## 2022-09-03 DIAGNOSIS — R73.03 PREDIABETES: ICD-10-CM

## 2022-09-06 ENCOUNTER — PATIENT MESSAGE (OUTPATIENT)
Dept: PRIMARY CARE CLINIC | Age: 42
End: 2022-09-06

## 2022-09-06 DIAGNOSIS — B37.0 ORAL THRUSH: ICD-10-CM

## 2022-09-06 RX ORDER — ASPIRIN 81 MG/1
TABLET, COATED ORAL
Qty: 90 TABLET | Refills: 1 | Status: SHIPPED | OUTPATIENT
Start: 2022-09-06

## 2022-09-06 NOTE — TELEPHONE ENCOUNTER
From: [facility]  To: Dr. Deja Clarke: 9/6/2022 8:27 AM EDT  Subject: My blood work results not normal     Good morning ,   Im sending you my blood work results from [facility] the hematocrit isnt in the normal range . Please let me know what that means. Also [facility] said they never recieved my nystatin swish and spit prescription. Can that be sent in please?  Thank you

## 2022-09-12 DIAGNOSIS — I10 PRIMARY HYPERTENSION: ICD-10-CM

## 2022-09-12 LAB
ALBUMIN SERPL-MCNC: 4.5 G/DL (ref 3.5–5.2)
ALP BLD-CCNC: 89 U/L (ref 35–104)
ALT SERPL-CCNC: 14 U/L (ref 0–32)
ANION GAP SERPL CALCULATED.3IONS-SCNC: 13 MMOL/L (ref 7–16)
AST SERPL-CCNC: 17 U/L (ref 0–31)
BASOPHILS ABSOLUTE: 0.06 E9/L (ref 0–0.2)
BASOPHILS RELATIVE PERCENT: 0.7 % (ref 0–2)
BILIRUB SERPL-MCNC: 0.2 MG/DL (ref 0–1.2)
BUN BLDV-MCNC: 14 MG/DL (ref 6–20)
CALCIUM SERPL-MCNC: 9.8 MG/DL (ref 8.6–10.2)
CHLORIDE BLD-SCNC: 102 MMOL/L (ref 98–107)
CO2: 23 MMOL/L (ref 22–29)
CREAT SERPL-MCNC: 0.6 MG/DL (ref 0.5–1)
EOSINOPHILS ABSOLUTE: 0.19 E9/L (ref 0.05–0.5)
EOSINOPHILS RELATIVE PERCENT: 2.3 % (ref 0–6)
GFR AFRICAN AMERICAN: >60
GFR NON-AFRICAN AMERICAN: >60 ML/MIN/1.73
GLUCOSE BLD-MCNC: 133 MG/DL (ref 74–99)
HCT VFR BLD CALC: 38.8 % (ref 34–48)
HEMOGLOBIN: 12.3 G/DL (ref 11.5–15.5)
IMMATURE GRANULOCYTES #: 0.03 E9/L
IMMATURE GRANULOCYTES %: 0.4 % (ref 0–5)
LYMPHOCYTES ABSOLUTE: 1.87 E9/L (ref 1.5–4)
LYMPHOCYTES RELATIVE PERCENT: 22.7 % (ref 20–42)
MCH RBC QN AUTO: 28.7 PG (ref 26–35)
MCHC RBC AUTO-ENTMCNC: 31.7 % (ref 32–34.5)
MCV RBC AUTO: 90.7 FL (ref 80–99.9)
MONOCYTES ABSOLUTE: 0.78 E9/L (ref 0.1–0.95)
MONOCYTES RELATIVE PERCENT: 9.5 % (ref 2–12)
NEUTROPHILS ABSOLUTE: 5.32 E9/L (ref 1.8–7.3)
NEUTROPHILS RELATIVE PERCENT: 64.4 % (ref 43–80)
PDW BLD-RTO: 12.6 FL (ref 11.5–15)
PLATELET # BLD: 383 E9/L (ref 130–450)
PMV BLD AUTO: 8.9 FL (ref 7–12)
POTASSIUM SERPL-SCNC: 4.6 MMOL/L (ref 3.5–5)
RBC # BLD: 4.28 E12/L (ref 3.5–5.5)
SODIUM BLD-SCNC: 138 MMOL/L (ref 132–146)
TOTAL PROTEIN: 7.6 G/DL (ref 6.4–8.3)
WBC # BLD: 8.3 E9/L (ref 4.5–11.5)

## 2022-09-30 DIAGNOSIS — R35.0 FREQUENCY OF MICTURITION: ICD-10-CM

## 2022-09-30 DIAGNOSIS — R82.90 ABNORMAL URINALYSIS: Primary | ICD-10-CM

## 2022-09-30 DIAGNOSIS — R30.0 DYSURIA: ICD-10-CM

## 2022-09-30 LAB
BACTERIA: ABNORMAL /HPF
BILIRUBIN URINE: NEGATIVE
BLOOD, URINE: ABNORMAL
CLARITY: CLEAR
COLOR: YELLOW
GLUCOSE URINE: NEGATIVE MG/DL
KETONES, URINE: ABNORMAL MG/DL
LEUKOCYTE ESTERASE, URINE: ABNORMAL
NITRITE, URINE: NEGATIVE
PH UA: 6 (ref 5–9)
PROTEIN UA: 30 MG/DL
RBC UA: >20 /HPF (ref 0–2)
SPECIFIC GRAVITY UA: >=1.03 (ref 1–1.03)
UROBILINOGEN, URINE: 0.2 E.U./DL
WBC UA: ABNORMAL /HPF (ref 0–5)

## 2022-09-30 PROCEDURE — 81003 URINALYSIS AUTO W/O SCOPE: CPT | Performed by: INTERNAL MEDICINE

## 2022-10-03 LAB — URINE CULTURE, ROUTINE: NORMAL

## 2022-10-05 RX ORDER — OMEPRAZOLE 20 MG/1
CAPSULE, DELAYED RELEASE ORAL
Qty: 180 CAPSULE | Refills: 0 | Status: SHIPPED | OUTPATIENT
Start: 2022-10-05

## 2022-10-13 DIAGNOSIS — B37.41 CANDIDA CYSTITIS: ICD-10-CM

## 2022-10-13 RX ORDER — SODIUM CHLORIDE 0.9 % (FLUSH) 0.9 %
5-40 SYRINGE (ML) INJECTION PRN
Status: CANCELLED | OUTPATIENT
Start: 2022-10-14

## 2022-10-13 RX ORDER — HEPARIN SODIUM (PORCINE) LOCK FLUSH IV SOLN 100 UNIT/ML 100 UNIT/ML
500 SOLUTION INTRAVENOUS PRN
Status: CANCELLED | OUTPATIENT
Start: 2022-10-14

## 2022-10-13 RX ORDER — SODIUM CHLORIDE 9 MG/ML
5-250 INJECTION, SOLUTION INTRAVENOUS PRN
Status: CANCELLED | OUTPATIENT
Start: 2022-10-14

## 2022-10-14 ENCOUNTER — HOSPITAL ENCOUNTER (OUTPATIENT)
Dept: INFUSION THERAPY | Age: 42
Setting detail: INFUSION SERIES
Discharge: HOME | End: 2022-10-14
Payer: COMMERCIAL

## 2022-10-14 VITALS
HEART RATE: 70 BPM | OXYGEN SATURATION: 98 % | TEMPERATURE: 98.7 F | DIASTOLIC BLOOD PRESSURE: 101 MMHG | SYSTOLIC BLOOD PRESSURE: 162 MMHG

## 2022-10-14 DIAGNOSIS — B37.41 CANDIDA CYSTITIS: Primary | ICD-10-CM

## 2022-10-14 PROCEDURE — 2580000003 HC RX 258: Performed by: SPECIALIST

## 2022-10-14 PROCEDURE — 6360000002 HC RX W HCPCS: Performed by: SPECIALIST

## 2022-10-14 PROCEDURE — 96365 THER/PROPH/DIAG IV INF INIT: CPT

## 2022-10-14 PROCEDURE — 96366 THER/PROPH/DIAG IV INF ADDON: CPT

## 2022-10-14 RX ORDER — SODIUM CHLORIDE 0.9 % (FLUSH) 0.9 %
5-40 SYRINGE (ML) INJECTION PRN
Status: DISCONTINUED | OUTPATIENT
Start: 2022-10-14 | End: 2022-10-15 | Stop reason: HOSPADM

## 2022-10-14 RX ORDER — SODIUM CHLORIDE 9 MG/ML
5-250 INJECTION, SOLUTION INTRAVENOUS PRN
Status: CANCELLED | OUTPATIENT
Start: 2022-10-15

## 2022-10-14 RX ORDER — HEPARIN SODIUM (PORCINE) LOCK FLUSH IV SOLN 100 UNIT/ML 100 UNIT/ML
500 SOLUTION INTRAVENOUS PRN
Status: CANCELLED | OUTPATIENT
Start: 2022-10-15

## 2022-10-14 RX ORDER — SODIUM CHLORIDE 0.9 % (FLUSH) 0.9 %
5-40 SYRINGE (ML) INJECTION PRN
Status: CANCELLED | OUTPATIENT
Start: 2022-10-15

## 2022-10-14 RX ORDER — VANCOMYCIN HYDROCHLORIDE 125 MG/1
125 CAPSULE ORAL DAILY
COMMUNITY

## 2022-10-14 RX ADMIN — Medication 10 ML: at 12:01

## 2022-10-14 RX ADMIN — CASPOFUNGIN ACETATE 70 MG: 7 INJECTION, POWDER, LYOPHILIZED, FOR SOLUTION INTRAVENOUS at 11:57

## 2022-10-15 ENCOUNTER — HOSPITAL ENCOUNTER (OUTPATIENT)
Dept: INFUSION THERAPY | Age: 42
Setting detail: INFUSION SERIES
Discharge: HOME | End: 2022-10-15
Payer: COMMERCIAL

## 2022-10-15 VITALS
DIASTOLIC BLOOD PRESSURE: 94 MMHG | OXYGEN SATURATION: 98 % | RESPIRATION RATE: 18 BRPM | SYSTOLIC BLOOD PRESSURE: 143 MMHG | TEMPERATURE: 97.9 F | HEART RATE: 75 BPM

## 2022-10-15 DIAGNOSIS — B37.41 CANDIDA CYSTITIS: Primary | ICD-10-CM

## 2022-10-15 PROCEDURE — 96365 THER/PROPH/DIAG IV INF INIT: CPT

## 2022-10-15 PROCEDURE — 2580000003 HC RX 258: Performed by: SPECIALIST

## 2022-10-15 PROCEDURE — 6360000002 HC RX W HCPCS: Performed by: SPECIALIST

## 2022-10-15 RX ORDER — SODIUM CHLORIDE 9 MG/ML
5-250 INJECTION, SOLUTION INTRAVENOUS PRN
Status: CANCELLED | OUTPATIENT
Start: 2022-10-16

## 2022-10-15 RX ORDER — HEPARIN SODIUM (PORCINE) LOCK FLUSH IV SOLN 100 UNIT/ML 100 UNIT/ML
500 SOLUTION INTRAVENOUS PRN
Status: CANCELLED | OUTPATIENT
Start: 2022-10-16

## 2022-10-15 RX ORDER — HEPARIN SODIUM (PORCINE) LOCK FLUSH IV SOLN 100 UNIT/ML 100 UNIT/ML
500 SOLUTION INTRAVENOUS PRN
Status: DISCONTINUED | OUTPATIENT
Start: 2022-10-15 | End: 2022-10-16 | Stop reason: HOSPADM

## 2022-10-15 RX ORDER — SODIUM CHLORIDE 0.9 % (FLUSH) 0.9 %
5-40 SYRINGE (ML) INJECTION PRN
Status: CANCELLED | OUTPATIENT
Start: 2022-10-16

## 2022-10-15 RX ORDER — SODIUM CHLORIDE 0.9 % (FLUSH) 0.9 %
5-40 SYRINGE (ML) INJECTION PRN
Status: DISCONTINUED | OUTPATIENT
Start: 2022-10-15 | End: 2022-10-16 | Stop reason: HOSPADM

## 2022-10-15 RX ADMIN — CASPOFUNGIN ACETATE 50 MG: 5 INJECTION, POWDER, LYOPHILIZED, FOR SOLUTION INTRAVENOUS at 08:07

## 2022-10-15 RX ADMIN — Medication 10 ML: at 08:07

## 2022-10-15 RX ADMIN — Medication 10 ML: at 09:19

## 2022-10-16 ENCOUNTER — HOSPITAL ENCOUNTER (OUTPATIENT)
Dept: INFUSION THERAPY | Age: 42
Setting detail: INFUSION SERIES
Discharge: HOME | End: 2022-10-16
Payer: COMMERCIAL

## 2022-10-16 VITALS
OXYGEN SATURATION: 98 % | RESPIRATION RATE: 18 BRPM | SYSTOLIC BLOOD PRESSURE: 145 MMHG | HEART RATE: 83 BPM | DIASTOLIC BLOOD PRESSURE: 89 MMHG

## 2022-10-16 DIAGNOSIS — B37.41 CANDIDA CYSTITIS: Primary | ICD-10-CM

## 2022-10-16 PROCEDURE — 6360000002 HC RX W HCPCS: Performed by: SPECIALIST

## 2022-10-16 PROCEDURE — 96365 THER/PROPH/DIAG IV INF INIT: CPT

## 2022-10-16 PROCEDURE — 2580000003 HC RX 258: Performed by: SPECIALIST

## 2022-10-16 RX ORDER — SODIUM CHLORIDE 9 MG/ML
5-250 INJECTION, SOLUTION INTRAVENOUS PRN
Status: CANCELLED | OUTPATIENT
Start: 2022-10-17

## 2022-10-16 RX ORDER — SODIUM CHLORIDE 9 MG/ML
5-250 INJECTION, SOLUTION INTRAVENOUS PRN
Status: DISCONTINUED | OUTPATIENT
Start: 2022-10-16 | End: 2022-10-17 | Stop reason: HOSPADM

## 2022-10-16 RX ORDER — SODIUM CHLORIDE 0.9 % (FLUSH) 0.9 %
5-40 SYRINGE (ML) INJECTION PRN
Status: CANCELLED | OUTPATIENT
Start: 2022-10-17

## 2022-10-16 RX ORDER — HEPARIN SODIUM (PORCINE) LOCK FLUSH IV SOLN 100 UNIT/ML 100 UNIT/ML
500 SOLUTION INTRAVENOUS PRN
Status: CANCELLED | OUTPATIENT
Start: 2022-10-17

## 2022-10-16 RX ORDER — SODIUM CHLORIDE 0.9 % (FLUSH) 0.9 %
5-40 SYRINGE (ML) INJECTION PRN
Status: DISCONTINUED | OUTPATIENT
Start: 2022-10-16 | End: 2022-10-17 | Stop reason: HOSPADM

## 2022-10-16 RX ADMIN — CASPOFUNGIN ACETATE 50 MG: 5 INJECTION, POWDER, LYOPHILIZED, FOR SOLUTION INTRAVENOUS at 08:07

## 2022-10-16 RX ADMIN — Medication 10 ML: at 08:12

## 2022-10-16 RX ADMIN — Medication 10 ML: at 09:07

## 2022-10-17 ENCOUNTER — HOSPITAL ENCOUNTER (OUTPATIENT)
Dept: INFUSION THERAPY | Age: 42
Setting detail: INFUSION SERIES
Discharge: HOME | End: 2022-10-17
Payer: COMMERCIAL

## 2022-10-17 VITALS
OXYGEN SATURATION: 98 % | TEMPERATURE: 98 F | HEART RATE: 76 BPM | RESPIRATION RATE: 18 BRPM | SYSTOLIC BLOOD PRESSURE: 176 MMHG | DIASTOLIC BLOOD PRESSURE: 90 MMHG

## 2022-10-17 DIAGNOSIS — B37.41 CANDIDA CYSTITIS: Primary | ICD-10-CM

## 2022-10-17 PROCEDURE — 96365 THER/PROPH/DIAG IV INF INIT: CPT

## 2022-10-17 PROCEDURE — 6360000002 HC RX W HCPCS: Performed by: SPECIALIST

## 2022-10-17 PROCEDURE — 2580000003 HC RX 258: Performed by: SPECIALIST

## 2022-10-17 RX ORDER — HEPARIN SODIUM (PORCINE) LOCK FLUSH IV SOLN 100 UNIT/ML 100 UNIT/ML
500 SOLUTION INTRAVENOUS PRN
Status: DISCONTINUED | OUTPATIENT
Start: 2022-10-17 | End: 2022-10-18 | Stop reason: HOSPADM

## 2022-10-17 RX ORDER — SODIUM CHLORIDE 0.9 % (FLUSH) 0.9 %
5-40 SYRINGE (ML) INJECTION PRN
Status: DISCONTINUED | OUTPATIENT
Start: 2022-10-17 | End: 2022-10-18 | Stop reason: HOSPADM

## 2022-10-17 RX ORDER — SODIUM CHLORIDE 0.9 % (FLUSH) 0.9 %
5-40 SYRINGE (ML) INJECTION PRN
Status: CANCELLED | OUTPATIENT
Start: 2022-10-18

## 2022-10-17 RX ORDER — HEPARIN SODIUM (PORCINE) LOCK FLUSH IV SOLN 100 UNIT/ML 100 UNIT/ML
500 SOLUTION INTRAVENOUS PRN
Status: CANCELLED | OUTPATIENT
Start: 2022-10-18

## 2022-10-17 RX ORDER — SODIUM CHLORIDE 9 MG/ML
5-250 INJECTION, SOLUTION INTRAVENOUS PRN
Status: CANCELLED | OUTPATIENT
Start: 2022-10-18

## 2022-10-17 RX ADMIN — SODIUM CHLORIDE, PRESERVATIVE FREE 10 ML: 5 INJECTION INTRAVENOUS at 08:22

## 2022-10-17 RX ADMIN — CASPOFUNGIN ACETATE 50 MG: 5 INJECTION, POWDER, LYOPHILIZED, FOR SOLUTION INTRAVENOUS at 08:22

## 2022-10-17 RX ADMIN — SODIUM CHLORIDE, PRESERVATIVE FREE 10 ML: 5 INJECTION INTRAVENOUS at 09:22

## 2022-10-18 ENCOUNTER — HOSPITAL ENCOUNTER (OUTPATIENT)
Dept: INFUSION THERAPY | Age: 42
Setting detail: INFUSION SERIES
Discharge: HOME | End: 2022-10-18
Payer: COMMERCIAL

## 2022-10-18 VITALS
OXYGEN SATURATION: 99 % | TEMPERATURE: 99.5 F | HEART RATE: 96 BPM | RESPIRATION RATE: 16 BRPM | DIASTOLIC BLOOD PRESSURE: 96 MMHG | SYSTOLIC BLOOD PRESSURE: 173 MMHG

## 2022-10-18 DIAGNOSIS — B37.41 CANDIDA CYSTITIS: Primary | ICD-10-CM

## 2022-10-18 PROCEDURE — 96365 THER/PROPH/DIAG IV INF INIT: CPT

## 2022-10-18 PROCEDURE — 2580000003 HC RX 258: Performed by: SPECIALIST

## 2022-10-18 PROCEDURE — 6360000002 HC RX W HCPCS: Performed by: SPECIALIST

## 2022-10-18 RX ORDER — HEPARIN SODIUM (PORCINE) LOCK FLUSH IV SOLN 100 UNIT/ML 100 UNIT/ML
500 SOLUTION INTRAVENOUS PRN
Status: DISCONTINUED | OUTPATIENT
Start: 2022-10-18 | End: 2022-10-19 | Stop reason: HOSPADM

## 2022-10-18 RX ORDER — HEPARIN SODIUM (PORCINE) LOCK FLUSH IV SOLN 100 UNIT/ML 100 UNIT/ML
500 SOLUTION INTRAVENOUS PRN
Status: CANCELLED | OUTPATIENT
Start: 2022-10-19

## 2022-10-18 RX ORDER — SODIUM CHLORIDE 0.9 % (FLUSH) 0.9 %
5-40 SYRINGE (ML) INJECTION PRN
Status: DISCONTINUED | OUTPATIENT
Start: 2022-10-18 | End: 2022-10-19 | Stop reason: HOSPADM

## 2022-10-18 RX ORDER — SODIUM CHLORIDE 0.9 % (FLUSH) 0.9 %
5-40 SYRINGE (ML) INJECTION PRN
Status: CANCELLED | OUTPATIENT
Start: 2022-10-19

## 2022-10-18 RX ORDER — SODIUM CHLORIDE 9 MG/ML
5-250 INJECTION, SOLUTION INTRAVENOUS PRN
Status: CANCELLED | OUTPATIENT
Start: 2022-10-19

## 2022-10-18 RX ADMIN — CASPOFUNGIN ACETATE 50 MG: 5 INJECTION, POWDER, LYOPHILIZED, FOR SOLUTION INTRAVENOUS at 08:18

## 2022-10-18 RX ADMIN — SODIUM CHLORIDE, PRESERVATIVE FREE 10 ML: 5 INJECTION INTRAVENOUS at 08:14

## 2022-10-18 RX ADMIN — SODIUM CHLORIDE, PRESERVATIVE FREE 10 ML: 5 INJECTION INTRAVENOUS at 09:18

## 2022-10-18 NOTE — PROGRESS NOTES
Patient did not receive the full dose of caspofungin. Stated she had to leave.  Received approx 175ml  of the 250ml dose

## 2022-10-19 ENCOUNTER — HOSPITAL ENCOUNTER (OUTPATIENT)
Dept: INFUSION THERAPY | Age: 42
Setting detail: INFUSION SERIES
Discharge: HOME | End: 2022-10-19
Payer: COMMERCIAL

## 2022-10-19 VITALS
DIASTOLIC BLOOD PRESSURE: 89 MMHG | TEMPERATURE: 98.4 F | HEART RATE: 99 BPM | OXYGEN SATURATION: 99 % | RESPIRATION RATE: 18 BRPM | SYSTOLIC BLOOD PRESSURE: 162 MMHG

## 2022-10-19 DIAGNOSIS — B37.41 CANDIDA CYSTITIS: Primary | ICD-10-CM

## 2022-10-19 PROCEDURE — 2580000003 HC RX 258: Performed by: SPECIALIST

## 2022-10-19 PROCEDURE — 96365 THER/PROPH/DIAG IV INF INIT: CPT

## 2022-10-19 PROCEDURE — 6360000002 HC RX W HCPCS: Performed by: SPECIALIST

## 2022-10-19 RX ORDER — HEPARIN SODIUM (PORCINE) LOCK FLUSH IV SOLN 100 UNIT/ML 100 UNIT/ML
500 SOLUTION INTRAVENOUS PRN
Status: DISCONTINUED | OUTPATIENT
Start: 2022-10-19 | End: 2022-10-20 | Stop reason: HOSPADM

## 2022-10-19 RX ORDER — SODIUM CHLORIDE 0.9 % (FLUSH) 0.9 %
5-40 SYRINGE (ML) INJECTION PRN
Status: DISCONTINUED | OUTPATIENT
Start: 2022-10-19 | End: 2022-10-20 | Stop reason: HOSPADM

## 2022-10-19 RX ORDER — SODIUM CHLORIDE 9 MG/ML
5-250 INJECTION, SOLUTION INTRAVENOUS PRN
Status: CANCELLED | OUTPATIENT
Start: 2022-10-20

## 2022-10-19 RX ORDER — SODIUM CHLORIDE 0.9 % (FLUSH) 0.9 %
5-40 SYRINGE (ML) INJECTION PRN
Status: CANCELLED | OUTPATIENT
Start: 2022-10-20

## 2022-10-19 RX ORDER — HEPARIN SODIUM (PORCINE) LOCK FLUSH IV SOLN 100 UNIT/ML 100 UNIT/ML
500 SOLUTION INTRAVENOUS PRN
Status: CANCELLED | OUTPATIENT
Start: 2022-10-20

## 2022-10-19 RX ADMIN — CASPOFUNGIN ACETATE 50 MG: 5 INJECTION, POWDER, LYOPHILIZED, FOR SOLUTION INTRAVENOUS at 08:29

## 2022-10-19 RX ADMIN — SODIUM CHLORIDE, PRESERVATIVE FREE 10 ML: 5 INJECTION INTRAVENOUS at 09:32

## 2022-10-19 RX ADMIN — SODIUM CHLORIDE, PRESERVATIVE FREE 10 ML: 5 INJECTION INTRAVENOUS at 08:31

## 2022-10-20 ENCOUNTER — HOSPITAL ENCOUNTER (OUTPATIENT)
Dept: INFUSION THERAPY | Age: 42
Setting detail: INFUSION SERIES
Discharge: HOME | End: 2022-10-20
Payer: COMMERCIAL

## 2022-10-20 VITALS
TEMPERATURE: 98.4 F | RESPIRATION RATE: 20 BRPM | HEART RATE: 88 BPM | SYSTOLIC BLOOD PRESSURE: 148 MMHG | DIASTOLIC BLOOD PRESSURE: 88 MMHG | OXYGEN SATURATION: 98 %

## 2022-10-20 DIAGNOSIS — B37.41 CANDIDA CYSTITIS: Primary | ICD-10-CM

## 2022-10-20 PROCEDURE — 6360000002 HC RX W HCPCS: Performed by: SPECIALIST

## 2022-10-20 PROCEDURE — 96365 THER/PROPH/DIAG IV INF INIT: CPT

## 2022-10-20 PROCEDURE — 2580000003 HC RX 258: Performed by: SPECIALIST

## 2022-10-20 RX ORDER — HEPARIN SODIUM (PORCINE) LOCK FLUSH IV SOLN 100 UNIT/ML 100 UNIT/ML
500 SOLUTION INTRAVENOUS PRN
Status: CANCELLED | OUTPATIENT
Start: 2022-10-21

## 2022-10-20 RX ORDER — HEPARIN SODIUM (PORCINE) LOCK FLUSH IV SOLN 100 UNIT/ML 100 UNIT/ML
500 SOLUTION INTRAVENOUS PRN
Status: DISCONTINUED | OUTPATIENT
Start: 2022-10-20 | End: 2022-10-21 | Stop reason: HOSPADM

## 2022-10-20 RX ORDER — SODIUM CHLORIDE 0.9 % (FLUSH) 0.9 %
5-40 SYRINGE (ML) INJECTION PRN
Status: DISCONTINUED | OUTPATIENT
Start: 2022-10-20 | End: 2022-10-21 | Stop reason: HOSPADM

## 2022-10-20 RX ORDER — SODIUM CHLORIDE 0.9 % (FLUSH) 0.9 %
5-40 SYRINGE (ML) INJECTION PRN
Status: CANCELLED | OUTPATIENT
Start: 2022-10-21

## 2022-10-20 RX ORDER — SODIUM CHLORIDE 9 MG/ML
5-250 INJECTION, SOLUTION INTRAVENOUS PRN
Status: CANCELLED | OUTPATIENT
Start: 2022-10-21

## 2022-10-20 RX ADMIN — SODIUM CHLORIDE, PRESERVATIVE FREE 10 ML: 5 INJECTION INTRAVENOUS at 08:29

## 2022-10-20 RX ADMIN — SODIUM CHLORIDE, PRESERVATIVE FREE 10 ML: 5 INJECTION INTRAVENOUS at 09:29

## 2022-10-20 RX ADMIN — CASPOFUNGIN ACETATE 50 MG: 5 INJECTION, POWDER, LYOPHILIZED, FOR SOLUTION INTRAVENOUS at 08:27

## 2022-10-21 ENCOUNTER — HOSPITAL ENCOUNTER (OUTPATIENT)
Dept: INFUSION THERAPY | Age: 42
Setting detail: INFUSION SERIES
Discharge: HOME | End: 2022-10-21
Payer: COMMERCIAL

## 2022-10-21 VITALS
HEART RATE: 85 BPM | OXYGEN SATURATION: 98 % | RESPIRATION RATE: 18 BRPM | SYSTOLIC BLOOD PRESSURE: 142 MMHG | TEMPERATURE: 98.6 F | DIASTOLIC BLOOD PRESSURE: 101 MMHG

## 2022-10-21 DIAGNOSIS — B37.41 CANDIDA CYSTITIS: Primary | ICD-10-CM

## 2022-10-21 LAB
ALBUMIN SERPL-MCNC: 4.2 G/DL (ref 3.5–5.2)
ALP BLD-CCNC: 94 U/L (ref 35–104)
ALT SERPL-CCNC: 13 U/L (ref 0–32)
ANION GAP SERPL CALCULATED.3IONS-SCNC: 14 MMOL/L (ref 7–16)
AST SERPL-CCNC: 16 U/L (ref 0–31)
BASOPHILS ABSOLUTE: 0.03 E9/L (ref 0–0.2)
BASOPHILS RELATIVE PERCENT: 0.5 % (ref 0–2)
BILIRUB SERPL-MCNC: 0.2 MG/DL (ref 0–1.2)
BUN BLDV-MCNC: 12 MG/DL (ref 6–20)
CALCIUM SERPL-MCNC: 9.5 MG/DL (ref 8.6–10.2)
CHLORIDE BLD-SCNC: 101 MMOL/L (ref 98–107)
CO2: 21 MMOL/L (ref 22–29)
CREAT SERPL-MCNC: 0.6 MG/DL (ref 0.5–1)
EOSINOPHILS ABSOLUTE: 0.14 E9/L (ref 0.05–0.5)
EOSINOPHILS RELATIVE PERCENT: 2.3 % (ref 0–6)
GFR SERPL CREATININE-BSD FRML MDRD: >60 ML/MIN/1.73
GLUCOSE BLD-MCNC: 124 MG/DL (ref 74–99)
HCT VFR BLD CALC: 37.8 % (ref 34–48)
HEMOGLOBIN: 12.5 G/DL (ref 11.5–15.5)
IMMATURE GRANULOCYTES #: 0.02 E9/L
IMMATURE GRANULOCYTES %: 0.3 % (ref 0–5)
LYMPHOCYTES ABSOLUTE: 1.34 E9/L (ref 1.5–4)
LYMPHOCYTES RELATIVE PERCENT: 21.7 % (ref 20–42)
MCH RBC QN AUTO: 28.3 PG (ref 26–35)
MCHC RBC AUTO-ENTMCNC: 33.1 % (ref 32–34.5)
MCV RBC AUTO: 85.5 FL (ref 80–99.9)
MONOCYTES ABSOLUTE: 0.5 E9/L (ref 0.1–0.95)
MONOCYTES RELATIVE PERCENT: 8.1 % (ref 2–12)
NEUTROPHILS ABSOLUTE: 4.15 E9/L (ref 1.8–7.3)
NEUTROPHILS RELATIVE PERCENT: 67.1 % (ref 43–80)
PDW BLD-RTO: 13.1 FL (ref 11.5–15)
PLATELET # BLD: 330 E9/L (ref 130–450)
PMV BLD AUTO: 9 FL (ref 7–12)
POTASSIUM SERPL-SCNC: 4.3 MMOL/L (ref 3.5–5)
RBC # BLD: 4.42 E12/L (ref 3.5–5.5)
SODIUM BLD-SCNC: 136 MMOL/L (ref 132–146)
TOTAL PROTEIN: 7.7 G/DL (ref 6.4–8.3)
WBC # BLD: 6.2 E9/L (ref 4.5–11.5)

## 2022-10-21 PROCEDURE — 36415 COLL VENOUS BLD VENIPUNCTURE: CPT

## 2022-10-21 PROCEDURE — 2580000003 HC RX 258: Performed by: SPECIALIST

## 2022-10-21 PROCEDURE — 96365 THER/PROPH/DIAG IV INF INIT: CPT

## 2022-10-21 PROCEDURE — 85025 COMPLETE CBC W/AUTO DIFF WBC: CPT

## 2022-10-21 PROCEDURE — 6360000002 HC RX W HCPCS: Performed by: SPECIALIST

## 2022-10-21 PROCEDURE — 80053 COMPREHEN METABOLIC PANEL: CPT

## 2022-10-21 RX ORDER — SODIUM CHLORIDE 0.9 % (FLUSH) 0.9 %
5-40 SYRINGE (ML) INJECTION PRN
Status: CANCELLED | OUTPATIENT
Start: 2022-10-21

## 2022-10-21 RX ORDER — SODIUM CHLORIDE 0.9 % (FLUSH) 0.9 %
5-40 SYRINGE (ML) INJECTION PRN
Status: DISCONTINUED | OUTPATIENT
Start: 2022-10-21 | End: 2022-10-22 | Stop reason: HOSPADM

## 2022-10-21 RX ORDER — HEPARIN SODIUM (PORCINE) LOCK FLUSH IV SOLN 100 UNIT/ML 100 UNIT/ML
500 SOLUTION INTRAVENOUS PRN
Status: DISCONTINUED | OUTPATIENT
Start: 2022-10-21 | End: 2022-10-22 | Stop reason: HOSPADM

## 2022-10-21 RX ORDER — SODIUM CHLORIDE 9 MG/ML
5-250 INJECTION, SOLUTION INTRAVENOUS PRN
Status: CANCELLED | OUTPATIENT
Start: 2022-10-21

## 2022-10-21 RX ORDER — HEPARIN SODIUM (PORCINE) LOCK FLUSH IV SOLN 100 UNIT/ML 100 UNIT/ML
500 SOLUTION INTRAVENOUS PRN
Status: CANCELLED | OUTPATIENT
Start: 2022-10-21

## 2022-10-21 RX ADMIN — SODIUM CHLORIDE, PRESERVATIVE FREE 10 ML: 5 INJECTION INTRAVENOUS at 08:29

## 2022-10-21 RX ADMIN — CASPOFUNGIN ACETATE 50 MG: 5 INJECTION, POWDER, LYOPHILIZED, FOR SOLUTION INTRAVENOUS at 08:11

## 2022-10-31 RX ORDER — CASPOFUNGIN ACETATE 5 MG/ML
50 INJECTION, POWDER, LYOPHILIZED, FOR SOLUTION INTRAVENOUS ONCE
Status: CANCELLED
Start: 2022-11-01 | End: 2022-11-01

## 2022-11-04 DIAGNOSIS — B37.9 INFECTION DUE TO CANDIDA SPECIES: ICD-10-CM

## 2022-11-04 RX ORDER — SODIUM CHLORIDE 9 MG/ML
5-250 INJECTION, SOLUTION INTRAVENOUS PRN
Status: CANCELLED | OUTPATIENT
Start: 2022-11-07

## 2022-11-04 RX ORDER — SODIUM CHLORIDE 0.9 % (FLUSH) 0.9 %
5-40 SYRINGE (ML) INJECTION PRN
Status: CANCELLED | OUTPATIENT
Start: 2022-11-07

## 2022-11-10 ENCOUNTER — APPOINTMENT (OUTPATIENT)
Dept: INFUSION THERAPY | Age: 42
End: 2022-11-10
Payer: COMMERCIAL

## 2022-11-11 ENCOUNTER — HOSPITAL ENCOUNTER (OUTPATIENT)
Dept: INFUSION THERAPY | Age: 42
Setting detail: INFUSION SERIES
Discharge: HOME | End: 2022-11-11
Payer: COMMERCIAL

## 2022-11-11 VITALS
SYSTOLIC BLOOD PRESSURE: 180 MMHG | OXYGEN SATURATION: 98 % | RESPIRATION RATE: 20 BRPM | HEART RATE: 95 BPM | TEMPERATURE: 98.6 F | DIASTOLIC BLOOD PRESSURE: 99 MMHG

## 2022-11-11 DIAGNOSIS — B37.9 INFECTION DUE TO CANDIDA SPECIES: Primary | ICD-10-CM

## 2022-11-11 PROCEDURE — 96365 THER/PROPH/DIAG IV INF INIT: CPT

## 2022-11-11 PROCEDURE — 6360000002 HC RX W HCPCS: Performed by: OBSTETRICS & GYNECOLOGY

## 2022-11-11 PROCEDURE — 96361 HYDRATE IV INFUSION ADD-ON: CPT

## 2022-11-11 PROCEDURE — 2580000003 HC RX 258: Performed by: OBSTETRICS & GYNECOLOGY

## 2022-11-11 RX ORDER — SODIUM CHLORIDE 9 MG/ML
5-250 INJECTION, SOLUTION INTRAVENOUS PRN
Status: CANCELLED | OUTPATIENT
Start: 2022-11-12

## 2022-11-11 RX ORDER — SODIUM CHLORIDE 0.9 % (FLUSH) 0.9 %
5-40 SYRINGE (ML) INJECTION PRN
Status: DISCONTINUED | OUTPATIENT
Start: 2022-11-11 | End: 2022-11-12 | Stop reason: HOSPADM

## 2022-11-11 RX ORDER — SODIUM CHLORIDE 9 MG/ML
5-250 INJECTION, SOLUTION INTRAVENOUS PRN
Status: DISCONTINUED | OUTPATIENT
Start: 2022-11-11 | End: 2022-11-12 | Stop reason: HOSPADM

## 2022-11-11 RX ORDER — SODIUM CHLORIDE 0.9 % (FLUSH) 0.9 %
5-40 SYRINGE (ML) INJECTION PRN
Status: CANCELLED | OUTPATIENT
Start: 2022-11-12

## 2022-11-11 RX ADMIN — Medication 10 ML: at 08:20

## 2022-11-11 RX ADMIN — MICAFUNGIN SODIUM 100 MG: 100 INJECTION, POWDER, LYOPHILIZED, FOR SOLUTION INTRAVENOUS at 08:20

## 2022-11-11 RX ADMIN — Medication 10 ML: at 09:22

## 2022-11-11 NOTE — DISCHARGE INSTRUCTIONS
Discussed signs and symptoms of allergic or hypersensitivity reaction but not limited to: Itching, with or without rash; acute wheezing or stridor; throat swelling or difficulty swallowing; dizziness and fast heart rate. For these symptoms other than rash and itching you should seek immediate medical attention. For rash, notify your doctor.

## 2022-11-12 ENCOUNTER — HOSPITAL ENCOUNTER (OUTPATIENT)
Dept: INFUSION THERAPY | Age: 42
Setting detail: INFUSION SERIES
Discharge: HOME | End: 2022-11-12
Payer: COMMERCIAL

## 2022-11-12 VITALS
OXYGEN SATURATION: 98 % | SYSTOLIC BLOOD PRESSURE: 174 MMHG | TEMPERATURE: 96.8 F | RESPIRATION RATE: 20 BRPM | HEART RATE: 96 BPM | DIASTOLIC BLOOD PRESSURE: 100 MMHG

## 2022-11-12 DIAGNOSIS — B37.9 INFECTION DUE TO CANDIDA SPECIES: Primary | ICD-10-CM

## 2022-11-12 PROCEDURE — 96365 THER/PROPH/DIAG IV INF INIT: CPT

## 2022-11-12 PROCEDURE — 2580000003 HC RX 258: Performed by: OBSTETRICS & GYNECOLOGY

## 2022-11-12 PROCEDURE — 6360000002 HC RX W HCPCS: Performed by: OBSTETRICS & GYNECOLOGY

## 2022-11-12 RX ORDER — SODIUM CHLORIDE 0.9 % (FLUSH) 0.9 %
5-40 SYRINGE (ML) INJECTION PRN
Status: DISCONTINUED | OUTPATIENT
Start: 2022-11-12 | End: 2022-11-13 | Stop reason: HOSPADM

## 2022-11-12 RX ORDER — SODIUM CHLORIDE 9 MG/ML
5-250 INJECTION, SOLUTION INTRAVENOUS PRN
Status: CANCELLED | OUTPATIENT
Start: 2022-11-13

## 2022-11-12 RX ORDER — SODIUM CHLORIDE 0.9 % (FLUSH) 0.9 %
5-40 SYRINGE (ML) INJECTION PRN
Status: CANCELLED | OUTPATIENT
Start: 2022-11-13

## 2022-11-12 RX ADMIN — MICAFUNGIN SODIUM 100 MG: 100 INJECTION, POWDER, LYOPHILIZED, FOR SOLUTION INTRAVENOUS at 07:56

## 2022-11-12 RX ADMIN — Medication 10 ML: at 07:57

## 2022-11-12 RX ADMIN — Medication 10 ML: at 08:56

## 2022-11-13 ENCOUNTER — HOSPITAL ENCOUNTER (OUTPATIENT)
Dept: INFUSION THERAPY | Age: 42
Setting detail: INFUSION SERIES
Discharge: HOME | End: 2022-11-13
Payer: COMMERCIAL

## 2022-11-13 VITALS
HEART RATE: 98 BPM | OXYGEN SATURATION: 98 % | TEMPERATURE: 98 F | DIASTOLIC BLOOD PRESSURE: 95 MMHG | SYSTOLIC BLOOD PRESSURE: 188 MMHG | RESPIRATION RATE: 20 BRPM

## 2022-11-13 DIAGNOSIS — B37.9 INFECTION DUE TO CANDIDA SPECIES: Primary | ICD-10-CM

## 2022-11-13 PROCEDURE — 6360000002 HC RX W HCPCS: Performed by: OBSTETRICS & GYNECOLOGY

## 2022-11-13 PROCEDURE — 2580000003 HC RX 258: Performed by: OBSTETRICS & GYNECOLOGY

## 2022-11-13 PROCEDURE — 96365 THER/PROPH/DIAG IV INF INIT: CPT

## 2022-11-13 RX ORDER — SODIUM CHLORIDE 0.9 % (FLUSH) 0.9 %
5-40 SYRINGE (ML) INJECTION PRN
Status: DISCONTINUED | OUTPATIENT
Start: 2022-11-13 | End: 2022-11-14 | Stop reason: HOSPADM

## 2022-11-13 RX ORDER — SODIUM CHLORIDE 9 MG/ML
5-250 INJECTION, SOLUTION INTRAVENOUS PRN
Status: CANCELLED | OUTPATIENT
Start: 2022-11-14

## 2022-11-13 RX ORDER — SODIUM CHLORIDE 0.9 % (FLUSH) 0.9 %
5-40 SYRINGE (ML) INJECTION PRN
Status: CANCELLED | OUTPATIENT
Start: 2022-11-14

## 2022-11-13 RX ADMIN — Medication 10 ML: at 07:53

## 2022-11-13 RX ADMIN — MICAFUNGIN SODIUM 100 MG: 100 INJECTION, POWDER, LYOPHILIZED, FOR SOLUTION INTRAVENOUS at 07:51

## 2022-11-13 RX ADMIN — Medication 10 ML: at 08:51

## 2022-11-14 ENCOUNTER — HOSPITAL ENCOUNTER (OUTPATIENT)
Dept: INFUSION THERAPY | Age: 42
Setting detail: INFUSION SERIES
Discharge: HOME | End: 2022-11-14
Payer: COMMERCIAL

## 2022-11-14 VITALS
SYSTOLIC BLOOD PRESSURE: 168 MMHG | OXYGEN SATURATION: 99 % | RESPIRATION RATE: 22 BRPM | TEMPERATURE: 98 F | DIASTOLIC BLOOD PRESSURE: 111 MMHG | HEART RATE: 98 BPM

## 2022-11-14 DIAGNOSIS — B37.9 INFECTION DUE TO CANDIDA SPECIES: Primary | ICD-10-CM

## 2022-11-14 PROCEDURE — 96365 THER/PROPH/DIAG IV INF INIT: CPT

## 2022-11-14 PROCEDURE — 6360000002 HC RX W HCPCS: Performed by: OBSTETRICS & GYNECOLOGY

## 2022-11-14 PROCEDURE — 2580000003 HC RX 258: Performed by: OBSTETRICS & GYNECOLOGY

## 2022-11-14 RX ORDER — SODIUM CHLORIDE 0.9 % (FLUSH) 0.9 %
5-40 SYRINGE (ML) INJECTION PRN
Status: CANCELLED | OUTPATIENT
Start: 2022-11-15

## 2022-11-14 RX ORDER — SODIUM CHLORIDE 0.9 % (FLUSH) 0.9 %
5-40 SYRINGE (ML) INJECTION PRN
Status: DISCONTINUED | OUTPATIENT
Start: 2022-11-14 | End: 2022-11-15 | Stop reason: HOSPADM

## 2022-11-14 RX ORDER — SODIUM CHLORIDE 9 MG/ML
5-250 INJECTION, SOLUTION INTRAVENOUS PRN
Status: CANCELLED | OUTPATIENT
Start: 2022-11-15

## 2022-11-14 RX ADMIN — Medication 10 ML: at 09:18

## 2022-11-14 RX ADMIN — MICAFUNGIN SODIUM 100 MG: 100 INJECTION, POWDER, LYOPHILIZED, FOR SOLUTION INTRAVENOUS at 08:10

## 2022-11-14 RX ADMIN — Medication 10 ML: at 08:03

## 2022-11-15 ENCOUNTER — HOSPITAL ENCOUNTER (OUTPATIENT)
Dept: INFUSION THERAPY | Age: 42
Setting detail: INFUSION SERIES
Discharge: HOME | End: 2022-11-15
Payer: COMMERCIAL

## 2022-11-15 VITALS
TEMPERATURE: 98 F | RESPIRATION RATE: 20 BRPM | HEART RATE: 97 BPM | OXYGEN SATURATION: 99 % | SYSTOLIC BLOOD PRESSURE: 170 MMHG | DIASTOLIC BLOOD PRESSURE: 108 MMHG

## 2022-11-15 DIAGNOSIS — B37.9 INFECTION DUE TO CANDIDA SPECIES: Primary | ICD-10-CM

## 2022-11-15 PROCEDURE — 96365 THER/PROPH/DIAG IV INF INIT: CPT

## 2022-11-15 PROCEDURE — 6360000002 HC RX W HCPCS: Performed by: OBSTETRICS & GYNECOLOGY

## 2022-11-15 PROCEDURE — 2580000003 HC RX 258: Performed by: OBSTETRICS & GYNECOLOGY

## 2022-11-15 RX ORDER — SODIUM CHLORIDE 0.9 % (FLUSH) 0.9 %
5-40 SYRINGE (ML) INJECTION PRN
Status: DISCONTINUED | OUTPATIENT
Start: 2022-11-15 | End: 2022-11-16 | Stop reason: HOSPADM

## 2022-11-15 RX ORDER — SODIUM CHLORIDE 0.9 % (FLUSH) 0.9 %
5-40 SYRINGE (ML) INJECTION PRN
Status: CANCELLED | OUTPATIENT
Start: 2022-11-16

## 2022-11-15 RX ORDER — SODIUM CHLORIDE 9 MG/ML
5-250 INJECTION, SOLUTION INTRAVENOUS PRN
OUTPATIENT
Start: 2022-11-16

## 2022-11-15 RX ADMIN — Medication 10 ML: at 08:58

## 2022-11-15 RX ADMIN — Medication 10 ML: at 08:09

## 2022-11-15 RX ADMIN — MICAFUNGIN SODIUM 100 MG: 100 INJECTION, POWDER, LYOPHILIZED, FOR SOLUTION INTRAVENOUS at 08:09

## 2022-11-16 ENCOUNTER — HOSPITAL ENCOUNTER (OUTPATIENT)
Dept: INFUSION THERAPY | Age: 42
Setting detail: INFUSION SERIES
Discharge: HOME | End: 2022-11-16

## 2022-11-16 DIAGNOSIS — B37.9 INFECTION DUE TO CANDIDA SPECIES: Primary | ICD-10-CM

## 2022-11-16 RX ORDER — SODIUM CHLORIDE 9 MG/ML
5-250 INJECTION, SOLUTION INTRAVENOUS PRN
OUTPATIENT
Start: 2022-11-17

## 2022-11-16 RX ORDER — SODIUM CHLORIDE 0.9 % (FLUSH) 0.9 %
5-40 SYRINGE (ML) INJECTION PRN
OUTPATIENT
Start: 2022-11-17

## 2022-11-16 RX ORDER — SODIUM CHLORIDE 0.9 % (FLUSH) 0.9 %
5-40 SYRINGE (ML) INJECTION PRN
Status: DISCONTINUED | OUTPATIENT
Start: 2022-11-16 | End: 2022-11-17 | Stop reason: HOSPADM

## 2023-01-03 ENCOUNTER — HOSPITAL ENCOUNTER (OUTPATIENT)
Dept: INFUSION THERAPY | Age: 43
Setting detail: INFUSION SERIES
Discharge: HOME | End: 2023-01-03

## 2023-01-04 RX ORDER — OMEPRAZOLE 20 MG/1
CAPSULE, DELAYED RELEASE ORAL
Qty: 180 CAPSULE | Refills: 0 | Status: SHIPPED | OUTPATIENT
Start: 2023-01-04

## 2023-01-04 NOTE — TELEPHONE ENCOUNTER
Last Appointment:  8/31/2022  Future Appointments   Date Time Provider Chinmay Galeano   1/5/2023  8:30 AM [address]   1/6/2023  8:30 AM [facility] INF CLINIC ROOM 4 [facility] [place]   1/7/2023  8:30 AM [facility] INF CLINIC ROOM 4 [facility] [place]   1/8/2023  8:30 AM [facility] INF CLINIC ROOM 4 [facility]   1/9/2023  8:30 AM [facility] INF CLINIC ROOM 4 [facility]   1/10/2023  8:30 AM [facility] INF CLINIC ROOM 4 [facility]   1/11/2023  8:30 AM [facility] INF CLINIC ROOM 4 [facility]   1/12/2023  8:30 AM [address] Luke Guerrero

## 2023-01-05 ENCOUNTER — HOSPITAL ENCOUNTER (OUTPATIENT)
Dept: INFUSION THERAPY | Age: 43
Setting detail: INFUSION SERIES
Discharge: HOME | End: 2023-01-05
Payer: COMMERCIAL

## 2023-01-05 VITALS
RESPIRATION RATE: 20 BRPM | HEART RATE: 99 BPM | TEMPERATURE: 99 F | SYSTOLIC BLOOD PRESSURE: 178 MMHG | OXYGEN SATURATION: 100 % | DIASTOLIC BLOOD PRESSURE: 110 MMHG

## 2023-01-05 DIAGNOSIS — B37.41 CANDIDA CYSTITIS: Primary | ICD-10-CM

## 2023-01-05 PROCEDURE — 6360000002 HC RX W HCPCS: Performed by: SPECIALIST

## 2023-01-05 PROCEDURE — 96365 THER/PROPH/DIAG IV INF INIT: CPT

## 2023-01-05 PROCEDURE — 2580000003 HC RX 258: Performed by: SPECIALIST

## 2023-01-05 RX ORDER — SODIUM CHLORIDE 0.9 % (FLUSH) 0.9 %
10 SYRINGE (ML) INJECTION PRN
Status: CANCELLED
Start: 2023-01-06

## 2023-01-05 RX ORDER — SODIUM CHLORIDE 0.9 % (FLUSH) 0.9 %
10 SYRINGE (ML) INJECTION PRN
Status: DISCONTINUED | OUTPATIENT
Start: 2023-01-05 | End: 2023-01-06 | Stop reason: HOSPADM

## 2023-01-05 RX ADMIN — CASPOFUNGIN ACETATE 70 MG: 7 INJECTION, POWDER, LYOPHILIZED, FOR SOLUTION INTRAVENOUS at 09:32

## 2023-01-05 RX ADMIN — SODIUM CHLORIDE, PRESERVATIVE FREE 10 ML: 5 INJECTION INTRAVENOUS at 08:53

## 2023-01-06 ENCOUNTER — HOSPITAL ENCOUNTER (OUTPATIENT)
Dept: INFUSION THERAPY | Age: 43
Setting detail: INFUSION SERIES
Discharge: HOME | End: 2023-01-06
Payer: COMMERCIAL

## 2023-01-06 VITALS — HEART RATE: 113 BPM | OXYGEN SATURATION: 97 % | TEMPERATURE: 98 F | RESPIRATION RATE: 24 BRPM

## 2023-01-06 DIAGNOSIS — B37.41 CANDIDA CYSTITIS: Primary | ICD-10-CM

## 2023-01-06 PROCEDURE — 96365 THER/PROPH/DIAG IV INF INIT: CPT

## 2023-01-06 PROCEDURE — 2580000003 HC RX 258: Performed by: SPECIALIST

## 2023-01-06 PROCEDURE — 6360000002 HC RX W HCPCS: Performed by: SPECIALIST

## 2023-01-06 RX ORDER — SODIUM CHLORIDE 0.9 % (FLUSH) 0.9 %
10 SYRINGE (ML) INJECTION PRN
Status: CANCELLED
Start: 2023-01-07

## 2023-01-06 RX ORDER — SODIUM CHLORIDE 0.9 % (FLUSH) 0.9 %
10 SYRINGE (ML) INJECTION PRN
Status: DISCONTINUED | OUTPATIENT
Start: 2023-01-06 | End: 2023-01-07 | Stop reason: HOSPADM

## 2023-01-06 RX ADMIN — CASPOFUNGIN ACETATE 50 MG: 5 INJECTION, POWDER, LYOPHILIZED, FOR SOLUTION INTRAVENOUS at 08:43

## 2023-01-06 RX ADMIN — SODIUM CHLORIDE, PRESERVATIVE FREE 10 ML: 5 INJECTION INTRAVENOUS at 08:48

## 2023-01-06 RX ADMIN — SODIUM CHLORIDE, PRESERVATIVE FREE 10 ML: 5 INJECTION INTRAVENOUS at 09:42

## 2023-01-07 ENCOUNTER — HOSPITAL ENCOUNTER (OUTPATIENT)
Dept: INFUSION THERAPY | Age: 43
Setting detail: INFUSION SERIES
Discharge: HOME | End: 2023-01-07
Payer: COMMERCIAL

## 2023-01-07 VITALS — RESPIRATION RATE: 22 BRPM | OXYGEN SATURATION: 99 % | TEMPERATURE: 99.2 F

## 2023-01-07 DIAGNOSIS — B37.41 CANDIDA CYSTITIS: Primary | ICD-10-CM

## 2023-01-07 PROCEDURE — 6360000002 HC RX W HCPCS: Performed by: SPECIALIST

## 2023-01-07 PROCEDURE — 2580000003 HC RX 258: Performed by: SPECIALIST

## 2023-01-07 PROCEDURE — 96365 THER/PROPH/DIAG IV INF INIT: CPT

## 2023-01-07 RX ORDER — SODIUM CHLORIDE 0.9 % (FLUSH) 0.9 %
10 SYRINGE (ML) INJECTION PRN
Status: DISCONTINUED | OUTPATIENT
Start: 2023-01-07 | End: 2023-01-08 | Stop reason: HOSPADM

## 2023-01-07 RX ORDER — SODIUM CHLORIDE 0.9 % (FLUSH) 0.9 %
10 SYRINGE (ML) INJECTION PRN
Status: CANCELLED
Start: 2023-01-08

## 2023-01-07 RX ADMIN — CASPOFUNGIN ACETATE 50 MG: 5 INJECTION, POWDER, LYOPHILIZED, FOR SOLUTION INTRAVENOUS at 08:34

## 2023-01-07 RX ADMIN — Medication 10 ML: at 08:10

## 2023-01-07 RX ADMIN — Medication 10 ML: at 09:35

## 2023-01-08 ENCOUNTER — HOSPITAL ENCOUNTER (OUTPATIENT)
Dept: INFUSION THERAPY | Age: 43
Setting detail: INFUSION SERIES
Discharge: HOME | End: 2023-01-08
Payer: COMMERCIAL

## 2023-01-08 DIAGNOSIS — B37.41 CANDIDA CYSTITIS: Primary | ICD-10-CM

## 2023-01-08 PROCEDURE — 96365 THER/PROPH/DIAG IV INF INIT: CPT

## 2023-01-08 PROCEDURE — 6360000002 HC RX W HCPCS: Performed by: SPECIALIST

## 2023-01-08 PROCEDURE — 2580000003 HC RX 258: Performed by: SPECIALIST

## 2023-01-08 RX ORDER — SODIUM CHLORIDE 0.9 % (FLUSH) 0.9 %
10 SYRINGE (ML) INJECTION PRN
Status: DISCONTINUED | OUTPATIENT
Start: 2023-01-08 | End: 2023-01-09 | Stop reason: HOSPADM

## 2023-01-08 RX ORDER — SODIUM CHLORIDE 0.9 % (FLUSH) 0.9 %
10 SYRINGE (ML) INJECTION PRN
Status: CANCELLED
Start: 2023-01-09

## 2023-01-08 RX ADMIN — Medication 10 ML: at 08:01

## 2023-01-08 RX ADMIN — Medication 10 ML: at 09:00

## 2023-01-08 RX ADMIN — CASPOFUNGIN ACETATE 50 MG: 5 INJECTION, POWDER, LYOPHILIZED, FOR SOLUTION INTRAVENOUS at 08:01

## 2023-01-09 ENCOUNTER — HOSPITAL ENCOUNTER (OUTPATIENT)
Dept: INFUSION THERAPY | Age: 43
Setting detail: INFUSION SERIES
Discharge: HOME | End: 2023-01-09
Payer: COMMERCIAL

## 2023-01-09 VITALS — TEMPERATURE: 99 F | HEART RATE: 86 BPM | OXYGEN SATURATION: 99 % | RESPIRATION RATE: 22 BRPM

## 2023-01-09 DIAGNOSIS — B37.41 CANDIDA CYSTITIS: Primary | ICD-10-CM

## 2023-01-09 PROCEDURE — 96365 THER/PROPH/DIAG IV INF INIT: CPT

## 2023-01-09 PROCEDURE — 2580000003 HC RX 258: Performed by: SPECIALIST

## 2023-01-09 PROCEDURE — 6360000002 HC RX W HCPCS: Performed by: SPECIALIST

## 2023-01-09 RX ORDER — SODIUM CHLORIDE 0.9 % (FLUSH) 0.9 %
10 SYRINGE (ML) INJECTION PRN
Status: CANCELLED
Start: 2023-01-10

## 2023-01-09 RX ORDER — SODIUM CHLORIDE 0.9 % (FLUSH) 0.9 %
10 SYRINGE (ML) INJECTION PRN
Status: DISCONTINUED | OUTPATIENT
Start: 2023-01-09 | End: 2023-01-10 | Stop reason: HOSPADM

## 2023-01-09 RX ADMIN — Medication 10 ML: at 09:58

## 2023-01-09 RX ADMIN — Medication 10 ML: at 08:40

## 2023-01-09 RX ADMIN — CASPOFUNGIN ACETATE 50 MG: 5 INJECTION, POWDER, LYOPHILIZED, FOR SOLUTION INTRAVENOUS at 08:57

## 2023-01-10 ENCOUNTER — HOSPITAL ENCOUNTER (OUTPATIENT)
Dept: INFUSION THERAPY | Age: 43
Setting detail: INFUSION SERIES
Discharge: HOME | End: 2023-01-10
Payer: COMMERCIAL

## 2023-01-10 DIAGNOSIS — B37.41 CANDIDA CYSTITIS: Primary | ICD-10-CM

## 2023-01-10 PROCEDURE — 2580000003 HC RX 258: Performed by: SPECIALIST

## 2023-01-10 PROCEDURE — 6360000002 HC RX W HCPCS: Performed by: SPECIALIST

## 2023-01-10 PROCEDURE — 96365 THER/PROPH/DIAG IV INF INIT: CPT

## 2023-01-10 RX ORDER — SODIUM CHLORIDE 0.9 % (FLUSH) 0.9 %
10 SYRINGE (ML) INJECTION PRN
Status: CANCELLED
Start: 2023-01-11

## 2023-01-10 RX ORDER — SODIUM CHLORIDE 0.9 % (FLUSH) 0.9 %
10 SYRINGE (ML) INJECTION PRN
Status: DISCONTINUED | OUTPATIENT
Start: 2023-01-10 | End: 2023-01-11 | Stop reason: HOSPADM

## 2023-01-10 RX ADMIN — CASPOFUNGIN ACETATE 50 MG: 5 INJECTION, POWDER, LYOPHILIZED, FOR SOLUTION INTRAVENOUS at 09:10

## 2023-01-10 RX ADMIN — SODIUM CHLORIDE, PRESERVATIVE FREE 10 ML: 5 INJECTION INTRAVENOUS at 10:04

## 2023-01-10 RX ADMIN — SODIUM CHLORIDE, PRESERVATIVE FREE 10 ML: 5 INJECTION INTRAVENOUS at 08:38

## 2023-01-11 ENCOUNTER — HOSPITAL ENCOUNTER (OUTPATIENT)
Dept: INFUSION THERAPY | Age: 43
Setting detail: INFUSION SERIES
Discharge: HOME | End: 2023-01-11
Payer: COMMERCIAL

## 2023-01-11 DIAGNOSIS — B37.41 CANDIDA CYSTITIS: Primary | ICD-10-CM

## 2023-01-11 PROCEDURE — 96365 THER/PROPH/DIAG IV INF INIT: CPT

## 2023-01-11 PROCEDURE — 6360000002 HC RX W HCPCS: Performed by: SPECIALIST

## 2023-01-11 PROCEDURE — 2580000003 HC RX 258: Performed by: SPECIALIST

## 2023-01-11 RX ORDER — SODIUM CHLORIDE 0.9 % (FLUSH) 0.9 %
10 SYRINGE (ML) INJECTION PRN
Start: 2023-01-11

## 2023-01-11 RX ORDER — SODIUM CHLORIDE 0.9 % (FLUSH) 0.9 %
10 SYRINGE (ML) INJECTION PRN
Status: DISCONTINUED | OUTPATIENT
Start: 2023-01-11 | End: 2023-01-12 | Stop reason: HOSPADM

## 2023-01-11 RX ADMIN — CASPOFUNGIN ACETATE 50 MG: 5 INJECTION, POWDER, LYOPHILIZED, FOR SOLUTION INTRAVENOUS at 08:51

## 2023-01-11 RX ADMIN — SODIUM CHLORIDE, PRESERVATIVE FREE 10 ML: 5 INJECTION INTRAVENOUS at 09:33

## 2023-01-11 RX ADMIN — SODIUM CHLORIDE, PRESERVATIVE FREE 10 ML: 5 INJECTION INTRAVENOUS at 08:52

## 2023-01-12 ENCOUNTER — HOSPITAL ENCOUNTER (OUTPATIENT)
Dept: INFUSION THERAPY | Age: 43
Setting detail: INFUSION SERIES
End: 2023-01-12

## 2023-03-11 ENCOUNTER — HOSPITAL ENCOUNTER (OUTPATIENT)
Dept: INFUSION THERAPY | Age: 43
Setting detail: INFUSION SERIES
Discharge: HOME | End: 2023-03-11
Payer: COMMERCIAL

## 2023-03-11 VITALS — TEMPERATURE: 99 F | RESPIRATION RATE: 22 BRPM | HEART RATE: 106 BPM | OXYGEN SATURATION: 99 %

## 2023-03-11 DIAGNOSIS — B37.41 CANDIDA CYSTITIS: Primary | ICD-10-CM

## 2023-03-11 PROCEDURE — 2580000003 HC RX 258: Performed by: OBSTETRICS & GYNECOLOGY

## 2023-03-11 PROCEDURE — 96365 THER/PROPH/DIAG IV INF INIT: CPT

## 2023-03-11 PROCEDURE — 6360000002 HC RX W HCPCS: Performed by: OBSTETRICS & GYNECOLOGY

## 2023-03-11 PROCEDURE — 2580000003 HC RX 258: Performed by: SPECIALIST

## 2023-03-11 RX ORDER — SODIUM CHLORIDE 0.9 % (FLUSH) 0.9 %
10 SYRINGE (ML) INJECTION PRN
Status: DISCONTINUED | OUTPATIENT
Start: 2023-03-11 | End: 2023-03-12 | Stop reason: HOSPADM

## 2023-03-11 RX ORDER — SODIUM CHLORIDE 0.9 % (FLUSH) 0.9 %
10 SYRINGE (ML) INJECTION PRN
Status: CANCELLED
Start: 2023-03-12

## 2023-03-11 RX ADMIN — SODIUM CHLORIDE, PRESERVATIVE FREE 10 ML: 5 INJECTION INTRAVENOUS at 08:30

## 2023-03-11 RX ADMIN — SODIUM CHLORIDE, PRESERVATIVE FREE 10 ML: 5 INJECTION INTRAVENOUS at 09:36

## 2023-03-11 RX ADMIN — CASPOFUNGIN ACETATE 70 MG: 7 INJECTION, POWDER, LYOPHILIZED, FOR SOLUTION INTRAVENOUS at 08:31

## 2023-03-12 ENCOUNTER — HOSPITAL ENCOUNTER (OUTPATIENT)
Dept: INFUSION THERAPY | Age: 43
Setting detail: INFUSION SERIES
Discharge: HOME | End: 2023-03-12
Payer: COMMERCIAL

## 2023-03-12 VITALS — HEART RATE: 88 BPM | RESPIRATION RATE: 20 BRPM | OXYGEN SATURATION: 98 % | TEMPERATURE: 98.2 F

## 2023-03-12 DIAGNOSIS — B37.41 CANDIDA CYSTITIS: Primary | ICD-10-CM

## 2023-03-12 PROCEDURE — 6360000002 HC RX W HCPCS: Performed by: OBSTETRICS & GYNECOLOGY

## 2023-03-12 PROCEDURE — 96365 THER/PROPH/DIAG IV INF INIT: CPT

## 2023-03-12 PROCEDURE — 2580000003 HC RX 258: Performed by: OBSTETRICS & GYNECOLOGY

## 2023-03-12 RX ORDER — SODIUM CHLORIDE 0.9 % (FLUSH) 0.9 %
10 SYRINGE (ML) INJECTION PRN
Status: CANCELLED
Start: 2023-03-13

## 2023-03-12 RX ORDER — SODIUM CHLORIDE 0.9 % (FLUSH) 0.9 %
10 SYRINGE (ML) INJECTION PRN
Status: DISCONTINUED | OUTPATIENT
Start: 2023-03-12 | End: 2023-03-13 | Stop reason: HOSPADM

## 2023-03-12 RX ADMIN — CASPOFUNGIN ACETATE 50 MG: 5 INJECTION, POWDER, LYOPHILIZED, FOR SOLUTION INTRAVENOUS at 08:27

## 2023-03-12 RX ADMIN — SODIUM CHLORIDE, PRESERVATIVE FREE 10 ML: 5 INJECTION INTRAVENOUS at 08:27

## 2023-03-12 RX ADMIN — SODIUM CHLORIDE, PRESERVATIVE FREE 10 ML: 5 INJECTION INTRAVENOUS at 09:28

## 2023-03-12 RX ADMIN — SODIUM CHLORIDE, PRESERVATIVE FREE 10 ML: 5 INJECTION INTRAVENOUS at 07:58

## 2023-03-13 ENCOUNTER — HOSPITAL ENCOUNTER (OUTPATIENT)
Dept: INFUSION THERAPY | Age: 43
Setting detail: INFUSION SERIES
Discharge: HOME | End: 2023-03-13
Payer: COMMERCIAL

## 2023-03-13 VITALS — RESPIRATION RATE: 20 BRPM | HEART RATE: 98 BPM | TEMPERATURE: 98.2 F | OXYGEN SATURATION: 98 %

## 2023-03-13 DIAGNOSIS — B37.41 CANDIDA CYSTITIS: Primary | ICD-10-CM

## 2023-03-13 PROCEDURE — 6360000002 HC RX W HCPCS: Performed by: OBSTETRICS & GYNECOLOGY

## 2023-03-13 PROCEDURE — 96365 THER/PROPH/DIAG IV INF INIT: CPT

## 2023-03-13 PROCEDURE — 2580000003 HC RX 258: Performed by: OBSTETRICS & GYNECOLOGY

## 2023-03-13 RX ORDER — SODIUM CHLORIDE 0.9 % (FLUSH) 0.9 %
10 SYRINGE (ML) INJECTION PRN
Status: CANCELLED
Start: 2023-03-14

## 2023-03-13 RX ORDER — SODIUM CHLORIDE 0.9 % (FLUSH) 0.9 %
10 SYRINGE (ML) INJECTION PRN
Status: DISCONTINUED | OUTPATIENT
Start: 2023-03-13 | End: 2023-03-14 | Stop reason: HOSPADM

## 2023-03-13 RX ADMIN — SODIUM CHLORIDE, PRESERVATIVE FREE 10 ML: 5 INJECTION INTRAVENOUS at 09:41

## 2023-03-13 RX ADMIN — CASPOFUNGIN ACETATE 50 MG: 5 INJECTION, POWDER, LYOPHILIZED, FOR SOLUTION INTRAVENOUS at 08:38

## 2023-03-13 RX ADMIN — SODIUM CHLORIDE, PRESERVATIVE FREE 10 ML: 5 INJECTION INTRAVENOUS at 08:06

## 2023-03-14 ENCOUNTER — HOSPITAL ENCOUNTER (OUTPATIENT)
Dept: INFUSION THERAPY | Age: 43
Setting detail: INFUSION SERIES
Discharge: HOME | End: 2023-03-14
Payer: COMMERCIAL

## 2023-03-14 VITALS
OXYGEN SATURATION: 96 % | DIASTOLIC BLOOD PRESSURE: 98 MMHG | HEART RATE: 90 BPM | RESPIRATION RATE: 22 BRPM | TEMPERATURE: 98.8 F | SYSTOLIC BLOOD PRESSURE: 159 MMHG

## 2023-03-14 DIAGNOSIS — B37.41 CANDIDA CYSTITIS: Primary | ICD-10-CM

## 2023-03-14 PROCEDURE — 96365 THER/PROPH/DIAG IV INF INIT: CPT

## 2023-03-14 PROCEDURE — 6360000002 HC RX W HCPCS: Performed by: OBSTETRICS & GYNECOLOGY

## 2023-03-14 PROCEDURE — 2580000003 HC RX 258: Performed by: OBSTETRICS & GYNECOLOGY

## 2023-03-14 RX ORDER — SODIUM CHLORIDE 0.9 % (FLUSH) 0.9 %
10 SYRINGE (ML) INJECTION PRN
Status: DISCONTINUED | OUTPATIENT
Start: 2023-03-14 | End: 2023-03-15 | Stop reason: HOSPADM

## 2023-03-14 RX ORDER — SODIUM CHLORIDE 0.9 % (FLUSH) 0.9 %
10 SYRINGE (ML) INJECTION PRN
Status: CANCELLED
Start: 2023-03-15

## 2023-03-14 RX ADMIN — SODIUM CHLORIDE, PRESERVATIVE FREE 10 ML: 5 INJECTION INTRAVENOUS at 09:33

## 2023-03-14 RX ADMIN — CASPOFUNGIN ACETATE 50 MG: 5 INJECTION, POWDER, LYOPHILIZED, FOR SOLUTION INTRAVENOUS at 08:21

## 2023-03-14 RX ADMIN — SODIUM CHLORIDE, PRESERVATIVE FREE 10 ML: 5 INJECTION INTRAVENOUS at 08:14

## 2023-03-15 ENCOUNTER — HOSPITAL ENCOUNTER (OUTPATIENT)
Dept: INFUSION THERAPY | Age: 43
Setting detail: INFUSION SERIES
Discharge: HOME | End: 2023-03-15
Payer: COMMERCIAL

## 2023-03-15 VITALS
SYSTOLIC BLOOD PRESSURE: 181 MMHG | RESPIRATION RATE: 20 BRPM | OXYGEN SATURATION: 97 % | DIASTOLIC BLOOD PRESSURE: 94 MMHG | TEMPERATURE: 98.7 F | HEART RATE: 103 BPM

## 2023-03-15 DIAGNOSIS — B37.41 CANDIDA CYSTITIS: Primary | ICD-10-CM

## 2023-03-15 PROCEDURE — 2580000003 HC RX 258: Performed by: OBSTETRICS & GYNECOLOGY

## 2023-03-15 PROCEDURE — 6360000002 HC RX W HCPCS: Performed by: OBSTETRICS & GYNECOLOGY

## 2023-03-15 PROCEDURE — 96365 THER/PROPH/DIAG IV INF INIT: CPT

## 2023-03-15 RX ORDER — SODIUM CHLORIDE 0.9 % (FLUSH) 0.9 %
10 SYRINGE (ML) INJECTION PRN
Status: CANCELLED
Start: 2023-03-16

## 2023-03-15 RX ORDER — SODIUM CHLORIDE 0.9 % (FLUSH) 0.9 %
10 SYRINGE (ML) INJECTION PRN
Status: DISCONTINUED | OUTPATIENT
Start: 2023-03-15 | End: 2023-03-16 | Stop reason: HOSPADM

## 2023-03-15 RX ADMIN — SODIUM CHLORIDE, PRESERVATIVE FREE 10 ML: 5 INJECTION INTRAVENOUS at 08:33

## 2023-03-15 RX ADMIN — SODIUM CHLORIDE, PRESERVATIVE FREE 10 ML: 5 INJECTION INTRAVENOUS at 09:30

## 2023-03-15 RX ADMIN — CASPOFUNGIN ACETATE 50 MG: 5 INJECTION, POWDER, LYOPHILIZED, FOR SOLUTION INTRAVENOUS at 08:37

## 2023-03-16 ENCOUNTER — HOSPITAL ENCOUNTER (OUTPATIENT)
Dept: INFUSION THERAPY | Age: 43
Setting detail: INFUSION SERIES
Discharge: HOME | End: 2023-03-16
Payer: COMMERCIAL

## 2023-03-16 VITALS
OXYGEN SATURATION: 98 % | SYSTOLIC BLOOD PRESSURE: 175 MMHG | RESPIRATION RATE: 24 BRPM | DIASTOLIC BLOOD PRESSURE: 80 MMHG | TEMPERATURE: 99 F | HEART RATE: 80 BPM

## 2023-03-16 DIAGNOSIS — B37.41 CANDIDA CYSTITIS: Primary | ICD-10-CM

## 2023-03-16 PROCEDURE — 96365 THER/PROPH/DIAG IV INF INIT: CPT

## 2023-03-16 PROCEDURE — 2580000003 HC RX 258: Performed by: OBSTETRICS & GYNECOLOGY

## 2023-03-16 PROCEDURE — 6360000002 HC RX W HCPCS: Performed by: OBSTETRICS & GYNECOLOGY

## 2023-03-16 RX ORDER — SODIUM CHLORIDE 0.9 % (FLUSH) 0.9 %
10 SYRINGE (ML) INJECTION PRN
Status: DISCONTINUED | OUTPATIENT
Start: 2023-03-16 | End: 2023-03-17 | Stop reason: HOSPADM

## 2023-03-16 RX ORDER — SODIUM CHLORIDE 0.9 % (FLUSH) 0.9 %
10 SYRINGE (ML) INJECTION PRN
Start: 2023-03-17

## 2023-03-16 RX ADMIN — Medication 10 ML: at 08:21

## 2023-03-16 RX ADMIN — CASPOFUNGIN ACETATE 50 MG: 5 INJECTION, POWDER, LYOPHILIZED, FOR SOLUTION INTRAVENOUS at 08:49

## 2023-03-16 RX ADMIN — Medication 10 ML: at 09:50

## 2023-03-17 ENCOUNTER — HOSPITAL ENCOUNTER (OUTPATIENT)
Dept: INFUSION THERAPY | Age: 43
Setting detail: INFUSION SERIES
Discharge: HOME | End: 2023-03-17

## 2023-04-04 RX ORDER — OMEPRAZOLE 20 MG/1
CAPSULE, DELAYED RELEASE ORAL
Qty: 180 CAPSULE | Refills: 0 | Status: SHIPPED | OUTPATIENT
Start: 2023-04-04

## 2023-07-05 RX ORDER — OMEPRAZOLE 20 MG/1
CAPSULE, DELAYED RELEASE ORAL
Qty: 180 CAPSULE | Refills: 0 | Status: SHIPPED | OUTPATIENT
Start: 2023-07-05

## 2024-10-18 NOTE — PROGRESS NOTES
HPI:  Patient comes in today for complaint of a pulling sensation in the elbow and proximal to the elbow and also tenderness in the shoulder area patient feels that she has inflammation in her tendons. She had phlebitis in the past in that same area and we have ordered a DVT study which came back negative. Patient states that pain is intermittent and as she is concerned about it  She also is having discomfort in her epigastric area and is requesting to have Protonix and to substitute omeprazole to see if that will help her stomach. .    Review of Systems   Constitutional: Negative for chills and fever. HENT: Negative for congestion. Respiratory: Negative for cough, chest tightness and shortness of breath. Cardiovascular: Negative for chest pain. Gastrointestinal: Positive for abdominal pain. Genitourinary: Negative. Neurological: Negative. Psychiatric/Behavioral: Negative.          Past Medical History:   Diagnosis Date    Cervicalgia     Cryptogenic stroke ([facility] [address] 11/7/2019    Diverticular disease     Gastritis     Hyperlipidemia     Hypertension     Nontoxic multinodular goiter     Prediabetes     Unspecified cerebral artery occlusion with cerebral infarction     Vertigo     Vitamin B12 deficiency      Past Surgical History:   Procedure Laterality Date    ABDOMINOPLASTY  2011    SIGMOIDOSCOPY      UPPER GASTROINTESTINAL ENDOSCOPY       Family History   Problem Relation Age of Onset    High Cholesterol Mother     Parkinsonism Mother     High Blood Pressure Mother     High Blood Pressure Father     Diabetes Father       Social History     Tobacco Use    Smoking status: Never Smoker    Smokeless tobacco: Never Used   Vaping Use    Vaping Use: Never used   Substance Use Topics    Alcohol use: No    Drug use: No        Current Outpatient Medications on File Prior to Visit   Medication Sig Dispense Refill    Cholecalciferol (VITAMIN D3) 125 MCG (5000 UT) TABS Take 1 tablet by mouth daily 30 tablet 5    XIFAXAN 550 MG tablet take 1 tablet by mouth three times a day for 14 days      saccharomyces boulardii (FLORASTOR) 250 MG capsule Take 250 mg by mouth 2 times daily      omeprazole 20 MG EC tablet Take 1 tablet by mouth 2 times daily 180 tablet 3    lisinopril (PRINIVIL;ZESTRIL) 2.5 MG tablet take 1 tablet by mouth once daily      acyclovir (ZOVIRAX) 200 MG capsule take 1 capsule by mouth four times a day if needed      CRESTOR 20 MG tablet Take 1 tablet by mouth daily 90 tablet 3    sucralfate (CARAFATE) 1 GM tablet Take 1 tablet by mouth 4 times daily 120 tablet 5    FEROSUL 325 (65 Fe) MG tablet take 1 tablet by mouth every other day      Cyanocobalamin (VITAMIN B 12 PO) Take by mouth      acetaminophen (TYLENOL) 500 MG tablet Take 500 mg by mouth every 6 hours as needed for Pain (tid daily currently x 1 month)      aspirin 81 MG tablet Take 81 mg by mouth nightly       diclofenac sodium (VOLTAREN) 1 % GEL Apply 2 g topically 2 times daily (Patient not taking: Reported on 9/2/2021) 100 g 3     No current facility-administered medications on file prior to visit. PE:  VS:  /80   Pulse 99   Temp 97.5 °F (36.4 °C) (Temporal)   Resp 20   Ht 5' 4\" (1.626 m)   Wt 196 lb 6.4 oz (89.1 kg)   SpO2 100%   BMI 33.71 kg/m²   General:  Alert and oriented, NAD  NECK:  Supple without adenopathy or thyromegaly, no carotid bruits. LUNGS:  CTA all fields, vesicular breathing no wheezes no rales symmetrical expansion. HEART:  RRR without M, R, or G, there is no precordial heave. ABDOMEN:  Soft and nontender without palpable abnormalities, No renal or aortic bruits. EXTREMITIES:  Without clubbing, cyanosis, or edema, 2+ DP/PT pulses B  Tenderness with palpation of the antecubital area and proximal to the antecubital area no masses there is prominence of the veins but no palpable masses. There is also tenderness over the bicipital tendon insertion in the left shoulder. Patient is able to move well with no limitation in the range of motion. Lab Results   Component Value Date    WBC 8.9 08/02/2021    HGB 13.6 08/02/2021    HCT 42.7 08/02/2021     08/02/2021    CHOL 194 04/27/2021    TRIG 276 04/27/2021    HDL 43 04/27/2021    ALT 20 04/27/2021    AST 19 04/27/2021     04/27/2021    K 4.3 04/27/2021     04/27/2021    CREATININE 0.57 04/27/2021    BUN 16 04/27/2021    CO2 21 04/27/2021    TSH 1.590 04/27/2021    INR 1.0 11/07/2019    LABA1C 5.8 04/27/2021    LABMICR 16 08/11/2021        Impression/Plan:    1. Left arm pain  -     ANGUS; Future  -     Sedimentation Rate; Future  -     Rheumatoid Factor; Future  -     C-Reactive Protein; Future  -     methylPREDNISolone (MEDROL DOSEPACK) 4 MG tablet; Take by mouth., Disp-21 tablet, R-0Normal  2. Tendinitis  -     ANGUS; Future  -     Sedimentation Rate; Future  -     Rheumatoid Factor; Future  -     C-Reactive Protein; Future  -     methylPREDNISolone (MEDROL DOSEPACK) 4 MG tablet; Take by mouth., Disp-21 tablet, R-0Normal  3. Elbow pain, left  -     ANGUS; Future  -     Sedimentation Rate; Future  -     Rheumatoid Factor; Future  -     C-Reactive Protein; Future  -     methylPREDNISolone (MEDROL DOSEPACK) 4 MG tablet; Take by mouth., Disp-21 tablet, R-0Normal  4. Bicipital tendinitis of left shoulder  -     ANGUS; Future  -     Sedimentation Rate; Future  -     Rheumatoid Factor; Future  -     C-Reactive Protein; Future  -     methylPREDNISolone (MEDROL DOSEPACK) 4 MG tablet; Take by mouth., Disp-21 tablet, R-0Normal  5. Chronic gastritis without bleeding, unspecified gastritis type  -     CBC WITH AUTO DIFFERENTIAL; Future  Musculoskeletal ultrasound to evaluate for tendinitis will be ordered discussed with the patient referral for physical therapy and also discussed referral for Dr. Elieser Chawla, sports medicine. Patient wants to get the ultrasound done first then consider referrals. cup/spoon/straw/fed by clinician cup/spoon/straw/fed by clinician